# Patient Record
Sex: FEMALE | Race: WHITE | NOT HISPANIC OR LATINO | Employment: FULL TIME | URBAN - METROPOLITAN AREA
[De-identification: names, ages, dates, MRNs, and addresses within clinical notes are randomized per-mention and may not be internally consistent; named-entity substitution may affect disease eponyms.]

---

## 2019-02-14 ENCOUNTER — OFFICE VISIT CONVERTED (OUTPATIENT)
Dept: SURGERY | Facility: CLINIC | Age: 39
End: 2019-02-14
Attending: SURGERY

## 2019-02-25 ENCOUNTER — HOSPITAL ENCOUNTER (OUTPATIENT)
Dept: PERIOP | Facility: HOSPITAL | Age: 39
Setting detail: HOSPITAL OUTPATIENT SURGERY
Discharge: HOME | End: 2019-02-25
Attending: SURGERY

## 2019-02-25 LAB — HCG UR QL: NEGATIVE

## 2019-02-27 ENCOUNTER — CONVERSION ENCOUNTER (OUTPATIENT)
Dept: SURGERY | Facility: CLINIC | Age: 39
End: 2019-02-27

## 2019-03-04 ENCOUNTER — OFFICE VISIT CONVERTED (OUTPATIENT)
Dept: UROLOGY | Facility: CLINIC | Age: 39
End: 2019-03-04
Attending: UROLOGY

## 2019-03-04 ENCOUNTER — CONVERSION ENCOUNTER (OUTPATIENT)
Dept: SURGERY | Facility: CLINIC | Age: 39
End: 2019-03-04

## 2019-03-04 ENCOUNTER — HOSPITAL ENCOUNTER (OUTPATIENT)
Dept: SURGERY | Facility: CLINIC | Age: 39
Discharge: HOME | End: 2019-03-04
Attending: UROLOGY

## 2019-03-09 LAB
AMPICILLIN SUSC ISLT: 26
BACTERIA UR CULT: ABNORMAL
CIPROFLOXACIN SUSC ISLT: <=0.5
CONV  VANCOMYCIN (BP): 4
CONV GENTAMICIN (KB): 24
ERYTHROMYCIN SUSC ISLT: 4
LEVOFLOXACIN SUSC ISLT: 1
LINEZOLID SUSC ISLT: 4
Lab: 28
NITROFURANTOIN SUSC ISLT: 23
TETRACYCLINE SUSC ISLT: <=1

## 2019-03-13 ENCOUNTER — OFFICE VISIT CONVERTED (OUTPATIENT)
Dept: SURGERY | Facility: CLINIC | Age: 39
End: 2019-03-13
Attending: SURGERY

## 2019-03-21 ENCOUNTER — OFFICE VISIT CONVERTED (OUTPATIENT)
Dept: UROLOGY | Facility: CLINIC | Age: 39
End: 2019-03-21
Attending: UROLOGY

## 2019-04-22 ENCOUNTER — OFFICE VISIT CONVERTED (OUTPATIENT)
Dept: UROLOGY | Facility: CLINIC | Age: 39
End: 2019-04-22
Attending: UROLOGY

## 2019-04-22 ENCOUNTER — CONVERSION ENCOUNTER (OUTPATIENT)
Dept: SURGERY | Facility: CLINIC | Age: 39
End: 2019-04-22

## 2019-05-03 ENCOUNTER — HOSPITAL ENCOUNTER (OUTPATIENT)
Dept: PERIOP | Facility: HOSPITAL | Age: 39
Setting detail: HOSPITAL OUTPATIENT SURGERY
Discharge: HOME | End: 2019-05-03
Attending: UROLOGY

## 2019-05-03 LAB — HCG UR QL: NEGATIVE

## 2019-05-08 ENCOUNTER — OFFICE VISIT CONVERTED (OUTPATIENT)
Dept: UROLOGY | Facility: CLINIC | Age: 39
End: 2019-05-08
Attending: UROLOGY

## 2019-05-13 LAB
COLOR STONE: NORMAL
COMPN STONE: NORMAL
CONV CA OXALATE MONOHYDRATE: 95 %
CONV CALCIUM PHOSPHATE: 5 %
CONV CALCULI COMMENT: NORMAL
CONV CALCULI DISCLAIMER: NORMAL
CONV CALCULI NOTE: NORMAL
NIDUS STONE QL: NORMAL
SIZE STONE: NORMAL MM
WT STONE: 33 MG

## 2020-11-30 ENCOUNTER — CONVERSION ENCOUNTER (OUTPATIENT)
Dept: CARDIOLOGY | Facility: CLINIC | Age: 40
End: 2020-11-30

## 2020-11-30 ENCOUNTER — OFFICE VISIT CONVERTED (OUTPATIENT)
Dept: CARDIOLOGY | Facility: CLINIC | Age: 40
End: 2020-11-30
Attending: INTERNAL MEDICINE

## 2020-12-09 ENCOUNTER — OFFICE VISIT CONVERTED (OUTPATIENT)
Dept: CARDIOLOGY | Facility: CLINIC | Age: 40
End: 2020-12-09
Attending: INTERNAL MEDICINE

## 2021-03-22 ENCOUNTER — CONVERSION ENCOUNTER (OUTPATIENT)
Dept: PLASTIC SURGERY | Facility: CLINIC | Age: 41
End: 2021-03-22

## 2021-03-22 ENCOUNTER — OFFICE VISIT CONVERTED (OUTPATIENT)
Dept: PLASTIC SURGERY | Facility: CLINIC | Age: 41
End: 2021-03-22
Attending: PLASTIC SURGERY

## 2021-04-12 ENCOUNTER — OFFICE VISIT CONVERTED (OUTPATIENT)
Dept: CARDIOLOGY | Facility: CLINIC | Age: 41
End: 2021-04-12
Attending: INTERNAL MEDICINE

## 2021-05-10 NOTE — PROCEDURES
"   Procedure Note      Patient Name: Laxmi Jc   Patient ID: [id number]   Sex: Female   YOB: 1980    Primary Care Provider: Danyell Feliciano APRN   Referring Provider: Kathie GRAY    Visit Date: December 9, 2020    Provider: Rony Spann MD   Location: [facility]   Location Address: [address] [place] [address], [address]   [place]LE Ivory  [id number]   Location Phone: [phone]          FINAL REPORT   TRANSTHORACIC ECHOCARDIOGRAM REPORT    Diagnosis: Palpitations, chest pain, shortness of breath.   Height: 5'2\" Weight: 171 B/P: 132/80 BSA: 1.8   Tech: BNS   MEASUREMENTS:  RVID (Diastole) : RVID. (NORMAL: 0.7 to 2.4 cm max)   LVID (Systole): 2.5 cm (Diastole): 4.5 cm . (NORMAL: 3.7 - 5.4 cm)   Posterior Wall Thickness (Diastole): 0.9 cm. (NORMAL: 0.8 - 1.1 cm)   Septal Thickness (Diastole): 1.0 cm. (NORMAL: 0.7 - 1.2 cm)   LAID (Systole): 3.9 cm. (NORMAL: 1.9 - 3.8 cm)   Aortic Root Diameter (Diastole): 2.5 cm. (NORMAL: 2.0 - 3.7 cm)   COMMENTS:  The patient underwent 2-D, M-Mode, and Doppler examination, including pulse-wave, continuous-wave, and color-flow analysis; the study is technically adequate.   FINDINGS:  MITRAL VALVE: Normal in appearance, opens well. No evidence of mitral valve prolapse. No mitral stenosis. Trace mitral regurgitation.   AORTIC VALVE: Normal trileaflet aortic valve, opens well. No evidence of aortic stenosis or regurgitation on Doppler examination.   TRICUSPID VALVE: Normal in appearance, opens well. Trace tricuspid regurgitation. Normal pulmonary artery systolic pressure.   PULMONIC VALVE: Grossly normal.   AORTIC ROOT: Normal in size with adequate motion.   LEFT ATRIUM: Normal in size. No intracavitary masses or clots seen. LA volume index is 22 mL/m2.   LEFT VENTRICLE: The left ventricular chamber size is normal. The left ventricular wall thickness is normal. Left ventricular systolic function is normal. Calculated LV ejection fraction by 3D echocardiography is " 66%. There are no regional wall motion abnormalities. Left ventricular diastolic function is normal.   RIGHT VENTRICLE: Normal size and function.   RIGHT ATRIUM: Normal in size.   PERICARDIUM: No evidence of pericardial effusion.   INFERIOR VENA CAVA: Measures 1.6 cm in diameter and there is more than 50% collapse during inspiration.   DOPPLER: E/A ratio is 1.1. DT= 208 msec. IVRT is 60 msec. E/E' is 9.   Faxed: 12/11/2020      CONCLUSION:  1.  Normal left ventricular systolic function with a calculated LV ejection fraction of 66%.   2.  Normal diastolic function of the left ventricle.   3.  No hemodynamically significant valvular abnormalities.       MD GRACY Maynard/danielito    This note was transcribed by Lamar Mariano.  danielito/gracy  The above service was transcribed by Lamar Mariano, and I attest to the accuracy of the note.  GRACY                 Electronically Signed by: Shital Mariano-, Other -Author on December 11, 2020 03:51:03 PM  Electronically Co-signed by: Rony Spann MD -Reviewer on December 14, 2020 08:31:36 AM

## 2021-05-10 NOTE — H&P
History and Physical      Patient Name: Laxmi Jc   Patient ID: [id number]   Sex: Female   YOB: 1980    Primary Care Provider: Danyell GRAY   Referring Provider: Ange GRAY    Visit Date: March 22, 2021    Provider: Belkys Griggs MD   Location: [facility]   Location Address: [address] [place] [address]  [id number]   Location Phone: [phone]          History Of Present Illness  Laxmi Jc is a 40 year old /White female who presents to the office today as a consult from Ange GRAY.      Patient states she has not always had breast asymmetry. Patient noticed the asymmetry after her left nipple procedure, had to have a papilla removed from left breast and since that time has noticed volume match and loss of shape/contour irregularities left brest. Patient works as a  at [employer], currently working from home and does a lot of desk work. Patient is overall healthy. Patient takes Toprol for heart palpitations. Patient will require clearance from cardiology, Dr. Spann and PCP. Patient states she is currently working on quitting smoking, using a Nictrol inhaler.              Past Medical History  Anxiety; Breast lump; Nipple discharge; Pituitary adenoma; Renal stone; Urinary hesitancy; Urinary Incontinence         Past Surgical History  Breast Surgery; Cystoscopy and ureteroscopy with laser lithotripsy; Cystoscopy and ureteroscopy with stent placement; Dilation and Curettage; Lumpectomy; Ovarian cystectomy         Medication List  fexofenadine 60 mg oral tablet; Lexapro 20 mg oral tablet; Norditropin FlexPro subcutaneous; Toprol XL 25 mg oral tablet extended release 24 hr         Allergy List  PENICILLINS; SULFA (SULFONAMIDES)         Family Medical History  Von Willebrand's Disease; Stomach Neoplasm, Malignant; Stroke; Renal Calculus; Kidney Disease; Bladder calculus; Blood Diseases; Hepatitis         Social  History  Alcohol (Never); Caffeine (Current every day); Second hand smoke exposure (Current some day); Tobacco (Current every day)         Review of Systems  · Cardiovascular  o Denies  o : chest pain, lower extremity edema, Heart Attack, Abnormal EKG  · Respiratory  o Denies  o : shortness of breath, wheezing, cough  · Neurologic  o Denies  o : muscular weakness, incoordination, tingling or numbness, headaches  · Psychiatric  o Denies  o : anxiety, depression, difficulty sleeping      Vitals  Date Time BP Position Site L\R Cuff Size HR RR TEMP (F) WT  HT  BMI kg/m2 BSA m2 O2 Sat FR L/min FiO2 HC       03/22/2021 09:23 /78 Sitting    64 - R  98.5     98 %  21%          Physical Examination  · Constitutional  o Appearance  o : well developed, well-nourished, Alert and oriented X3  · Eyes  o Sclerae  o : sclerae white  · Respiratory  o Respiratory Effort  o : breathing unlabored   · Cardiovascular  o Heart  o : regular rate  · Breasts  o FEMALE BREAST EXAM  o :   § Masses  § : no masses  § Nipple Discharge  § : no nipple discharge  § Axillary Lymphadenopathy  § : no axillary lymphadenopathy  · Gastrointestinal  o Abdominal Examination  o : abdomen nontender to palpation  · Lymphatic  o Axilla  o : No lymphadenopathy present  · Skin and Subcutaneous Tissue  o General Inspection  o : no lesions present, no areas of discoloration, skin turgor normal, texture normal  · Psychiatric  o Judgement and Insight  o : judgment and insight intact  o Mood and Affect  o : mood normal, affect appropriate     left breast smaller and nipple hangs lower,           Assessment  · Pre-operative examination     V72.84/Z01.818  · Tobacco abuse     305.1/Z72.0  · Breast asymmetry     611.89/N64.89  · Intraductal papilloma of breast, left     217/D24.2      Plan  · Orders  o Smoking cessation counseling, 3-10 minutes [facility] (07205) - 305.1/Z72.0 - 03/22/2021  o Plastics reconstructive visit ([facility]REC) - - 03/23/2021  o Cotinine screen urine  (done at [facility] PAT) (99969) - - 03/23/2021  o Cotinine screen urine (done at [facility] SDS) (88568) - - 03/23/2021  o Cotinine screen urine (done at [facility]) (30364) - 305.1/Z72.0 - 03/22/2021  o [facility] Pre-Op Covid-19 Screening (87519) - - 03/23/2021  · Medications  o Medications have been Reconciled  o Transition of Care or Provider Policy  · Instructions  o Surgical Facility: [facility]   o ****Surgical Orders****  o ****Patient Status****  o ********************  o RISK AND BENEFITS:  o Consent for surgery: Given these options, the patient has verbally expressed an understanding of the risks of surgery and finds these risks acceptable. We will proceed with surgery as soon as possible.  o O.R. PREP: Per protocol  o IV: Per Anesthesia  o SCD's preoperatively  o Please sign permit for: bilateral mastopexy with fat grafting to left breast  o Please do not give any anethesia until patient's site has been marked.   o *******************************************  o PRE- OP MEDICATION ORDER:  o *******************************************  o Kefzol 2 grams IV on call to OR.  o Prior to procedure, patient had negative COVID verbal screening and signed COVID consent.  o The above History and Physical Examination has been completed within 30 days of admission.  o ********************  o Tobacco and smoking cessation counseling for more than 3 minutes was completed. I counseled the patient to stop smoking before surgery in order to avoid complications such as wound healing problems and infection. We discussed smoking cessation strategies such as nicotine patches and vapor. I counseled the patient to be nicotine free because the nicotine is what causes vasoconstriction of the blood vessels.  o The patient has volume mismatch, contour irregularities and asymmetry following removal of left breast papilloma. We had a long discussion and the best way to achieve symmetry would be a bilateral mastopexy and fat grafting to left  breast. She understands she must be nicotine free prior to surgery to avoid complications. Once she has stopped smoking she will take a cotinine test and call to schedule  o The patient was given literature in regards to the procedure and encouraged to visit the websites of ASPS and ASAPS.  o We discussed the procedure for bilateral mastopexy under general anesthesia as well as the postoperative recovery time and the need for limitation of activities. The risks of surgery were discussed including bleeding, infection, scarring (for which diagrams were drawn), pain, poor healing, loss of skin and or nipple, change in nipple sensation, inability to breast feed, asymmetry, no guarantee of postoperative cup size.  o Electronically Identified Patient Education Materials Provided Electronically            Electronically Signed by: Belkys Griggs MD -Author on March 23, 2021 07:44:53 AM

## 2021-05-10 NOTE — H&P
History and Physical      Patient Name: Laxmi Jc   Patient ID: [id number]   Sex: Female   YOB: 1980    Primary Care Provider: Danyell GRAY   Referring Provider: Kathie GRAY    Visit Date: November 30, 2020    Provider: Rony Spann MD   [facility]   Location Address: [address] [place] [address], [facility]   [place]LE Iv[place]  [id number]   Location Phone: [phone]          Chief Complaint     Reason for Consultation: Palpitations, chest pain, shortness of breath.       History Of Present Illness  Consult requested by: Kathie GRAY   Laxmi Jc is a 40 year old /White female, with pituitary adenoma and unspecified autoimmune disorder, along with depression, who was referred for a cardiac evaluation. For the past one to one-and-a-half years, patient is reporting palpitations. They are described as the heart racing and pounding, and at times, skipping beats. Symptoms are present both during rest and with activities. Even walking for a few feet makes her heart rate go up to the 180s. She brought her heart rate log from her Apple watch that showed her heart rate ranges between 60 to 180 and mostly in the 120s. She also reports chest pain, described as a sharp pain on the left side of the chest, both at rest and activities. There is shortness of breath on moderate exertion. Her symptoms are progressively getting worse. Recently, she had lab testing for hives and was noted to have an autoimmune disorder. She is going to have an appointment with a rheumatologist soon. She has no history of any previous cardiac illness and no previous cardiac workup available.   PAST MEDICAL HISTORY: 1) Pituitary adenoma; 2) Unspecified autoimmune disorder; 3) Depression; 4) Negative for diabetes mellitus, hypertension, and hyperlipidemia.   PSYCHOSOCIAL HISTORY: Current smoker who smokes a pack of cigarettes per day. Drinks alcohol rarely.   FAMILY HISTORY: Reviewed. Positive  "for premature coronary artery disease. Patient's father had a stroke in his 40's.   CURRENT MEDICATIONS: Escitalopram 20 mg daily; Norditropin FlexPro 1.5 mL/5 mg 5 days a week.   ALLERGIES: Penicillin; SULFA (SULFONAMIDES).       Review of Systems  · Constitutional  o Admits  o : fatigue, recent weight changes   o Denies  o : good general health lately  · Eyes  o Denies  o : blurred vision  · HENT  o Denies  o : hearing loss or ringing, chronic sinus problem, swollen glands in neck  · Cardiovascular  o Admits  o : chest pain, palpitations (fast, fluttering, or skipping beats), swelling (feet, ankles, hands), shortness of breath while walking or lying flat  · Respiratory  o Denies  o : chronic or frequent cough, asthma or wheezing, COPD  · Gastrointestinal  o Denies  o : ulcers, nausea or vomiting  · Neurologic  o Admits  o : headaches  o Denies  o : lightheaded or dizzy, stroke  · Musculoskeletal  o Admits  o : joint pain, back pain  · Endocrine  o Admits  o : excessive thirst or urination  o Denies  o : thyroid disease, diabetes, heat or cold intolerance  · Heme-Lymph  o Denies  o : bleeding or bruising tendency, anemia      Vitals  Date Time BP Position Site L\R Cuff Size HR RR TEMP (F) WT  HT  BMI kg/m2 BSA m2 O2 Sat FR L/min FiO2        11/30/2020 01:13 /80 Sitting    78 - R   171lbs 0oz 5'  2\" 31.28 1.84             Physical Examination  · Constitutional  o Appearance  o : Awake, alert, in no acute distress.  · Head and Face  o HEENT  o : No pallor, anicteric. Eyes normal. Moist mucous membranes.  · Neck  o Inspection/Palpation  o : Supple.   o Jugular Veins  o : No JVD. No carotid bruits.  · Respiratory  o Auscultation of Lungs  o : Clear to auscultation bilaterally. No crackles or wheezing.  · Cardiovascular  o Heart  o : S1, S2 normally heard. No S3. No murmur, rubs, or gallops.  · Gastrointestinal  o Abdominal Examination  o : Soft, non-distended. No palpable hepatosplenomegaly. Bowel sounds heard " in all four quadrants.  · Musculoskeletal  o General  o : Normal muscle tone and strength.  · Skin and Subcutaneous Tissue  o General Inspection  o : No skin rashes.  · Extremities  o Extremities  o : Warm and well perfused. Distal pulses present. No pitting pedal edema.  · EKG  o EKG  o : Performed in the office today.  o Indications  o : Palpitations.  o Results  o : Normal sinus rhythm. Normal axis. No significant ST-T changes. Normal EKG.  o Comparison  o : No previous EKG available for comparison.   · Labs  o Labs  o : On 08/15/2020, triglycerides 178, total cholesterol 210, , HDL 48, TSH 1.06, Free T4 1.03, hemoglobin 13.6, hematocrit 38.7, BUN 10, creatinine 0.77, sodium 138, potassium 4.2, chloride 103, calcium 9.4, total protein 7.3, albumin 4.3.           Assessment     ASSESSMENT & PLAN:    1. Palpitations/tachycardia.  Significant arrhythmia needs to be ruled out.  Thyroid panel within normal limits.        Hemoglobin within normal limits.  Will proceed with a 48-hour Holter monitor study.  Patient will also need       an echocardiogram to assess the LV systolic and diastolic function and to rule out significant valvular       abnormalities.  Once arrhythmias are ruled out, patient can treated with beta-blockers for inappropriate       sinus tachycardia.    2.  Chest pain.  Very atypical symptoms.  Start the workup with an echocardiogram.  3.  Follow up with echo and Holter reports.      MD GRACY Maynard:annette             Electronically Signed by: Zo Mcclain-, Other -Author on December 7, 2020 10:04:58 AM  Electronically Co-signed by: Rony Spann MD -Reviewer on December 7, 2020 10:25:53 AM

## 2021-05-10 NOTE — PROCEDURES
Procedure Note      Patient Name: Laxmi Jc   Patient ID: [id number]   Sex: Female   YOB: 1980    Primary Care Provider: Danyell GRAY   Referring Provider: Kathie GRAY    Visit Date: December 9, 2020    Provider: Rony Spann MD   Location: [facility]   Location Address: [address] [place] [address], [address]   [place]LE Iv[place]  [id number]   Location Phone: [phone]          FINAL REPORT   HOLTER MONITOR REPORT  Date: 12/09/2020   Indication: Palpitations      The patient was monitored for a period of 48 hour starting from 14:54 on 12/09/2020. The underlying rhythm was normal sinus rhythm with heart rates varying from 63 beats per minute to 147 beats per minute, average being 83 beats per minute. The fastest heart rate of 147 beats per minute was documented at 12:20 PM on 12/10/2020 in sinus tachycardia. Rare (3 in total) isolated ventricular ectopic beats noted. Rare (42 in total) supraventricular ectopic beats noted. There was a 5-beat run of supraventricular ectopy at 8:13 AM on 12/10/2020. There were no significant pauses. The patient reported several episodes of palpitations during the study period. However, there were no events noted on monitor strips at the time of reported symptoms.     CONCLUSION:  Forty-eight Holter monitor study showing sinus rhythm and a 5-beat run of supraventricular ectopy. There was no correlation of patient symptoms to any arrhythmias.      MD GRACY Maynard/pap                 Electronically Signed by: Shital Mariano-, Other -Author on December 22, 2020 08:59:25 AM  Electronically Co-signed by: Rony Spann MD -Reviewer on December 28, 2020 05:34:07 PM

## 2021-05-14 VITALS
WEIGHT: 171 LBS | BODY MASS INDEX: 31.47 KG/M2 | DIASTOLIC BLOOD PRESSURE: 80 MMHG | HEART RATE: 78 BPM | SYSTOLIC BLOOD PRESSURE: 132 MMHG | HEIGHT: 62 IN

## 2021-05-14 VITALS
OXYGEN SATURATION: 98 % | DIASTOLIC BLOOD PRESSURE: 78 MMHG | SYSTOLIC BLOOD PRESSURE: 115 MMHG | HEART RATE: 64 BPM | TEMPERATURE: 98.5 F

## 2021-05-14 VITALS
HEART RATE: 70 BPM | WEIGHT: 170 LBS | HEIGHT: 62 IN | SYSTOLIC BLOOD PRESSURE: 136 MMHG | DIASTOLIC BLOOD PRESSURE: 64 MMHG | BODY MASS INDEX: 31.28 KG/M2

## 2021-05-14 NOTE — PROGRESS NOTES
Progress Note      Patient Name: Laxmi Jc   Patient ID: [id number]   Sex: Female   YOB: 1980    Primary Care Provider: Danyell GRAY   Referring Provider: Kathie GRAY    Visit Date: April 12, 2021    Provider: Rony Spann MD   Location: [facility]   Location Address: [address] [place] [address], [address]   [place]  [id number]   Location Phone: [phone]          Chief Complaint     Follow-up visit per palpitations, chest pain, and shortness of breath.       History Of Present Illness  REFERRING CARE PROVIDER: Kathie GRAY   Laxmi Jc is a 40 year old /White female with pituitary adenoma, auto immune disease, depression, who is here for a follow-up visit. She was previously seen in November because of multiple symptoms including chest pain, shortness of breath, and palpitations. Subsequent echocardiogram was unremarkable. A 24-hour Holter monitor study showed episodes of sinus tachycardia and a 5 beat run of supraventricular ectopy. She was started on low dose beta blockers. Today, the patient reports her symptoms are somewhat better when she walks. Heart rate currently goes up to 130s and previously use to go up to 160s and 170s. She has not exercised recently. She had a visit to the ER in early March because of chest pain and numbness of the right arm. Cardiac workup was unremarkable and she was discharged home. No dizziness or syncopal episodes.   PAST MEDICAL HISTORY: 1) Pituitary adenoma; 2) Unspecified autoimmune disorder; 3) Depression; 4) Negative for diabetes mellitus, hypertension, and hyperlipidemia.   PSYCHOSOCIAL HISTORY: Denies alcohol use. Currently smokes one pack per day.   CURRENT MEDICATIONS: Medication list was reviewed and is as documented.      ALLERGIES: Sulfa; Penicillin.       Review of Systems  · Cardiovascular  o Admits  o : palpitations (fast, fluttering, or skipping beats), swelling (feet, ankles, hands), shortness of  "breath while walking or lying flat, chest pain or angina pectoris   · Respiratory  o Denies  o : chronic or frequent cough      Vitals  Date Time BP Position Site L\R Cuff Size HR RR TEMP (F) WT  HT  BMI kg/m2 BSA m2 O2 Sat FR L/min FiO2 HC       04/12/2021 02:12 /64 Sitting    70 - R   170lbs 0oz 5'  2\" 31.09 1.84             Physical Examination  · Respiratory  o Auscultation of Lungs  o : Clear to auscultation bilaterally. No crackles or rhonchi.  · Cardiovascular  o Heart  o : S1, S2 is normally heard. No S3. No murmur, rubs, or gallops.  · Gastrointestinal  o Abdominal Examination  o : Soft, nontender, nondistended. No free fluid. Bowel sounds heard in all four quadrants.  · Extremities  o Extremities  o : Warm and well perfused. No pitting pedal edema. Distal pulses present.          Assessment     1.  Tachycardia/palpitations. 24-hour Holter monitor study showed presence of sinus tachycardia and short runs of supraventricular ectopy. Symptoms are better after starting low dose beta blockers. We will increase the dose of Toprol XL 25 mg once daily and the dose can be followed and titrated up after 2-3 weeks for better symptomatic management. She will call us back if she is not feeling better after 3 weeks. Other options include changing to calcium channel blocker. Of note, previous echo showed normal left ventricular function.  2.  Chest pain, very atypical symptoms. Echo unremarkable. No chest pain episodes over the past 1 month. We will hold off on doing any further testing at this time.   3.  Follow-up in 6 months.         Rony Spann MD  JV/vh               Electronically Signed by: Soni Leija-, OT -Author on April 19, 2021 07:45:18 AM  Electronically Co-signed by: Rony Spann MD -Reviewer on April 19, 2021 08:06:36 AM  "

## 2021-05-15 VITALS — HEIGHT: 62 IN | WEIGHT: 170 LBS | BODY MASS INDEX: 31.28 KG/M2 | RESPIRATION RATE: 14 BRPM

## 2021-05-15 VITALS — BODY MASS INDEX: 30.94 KG/M2 | WEIGHT: 168.12 LBS | HEIGHT: 62 IN | RESPIRATION RATE: 12 BRPM

## 2021-05-15 VITALS — RESPIRATION RATE: 15 BRPM | WEIGHT: 168.37 LBS | BODY MASS INDEX: 30.98 KG/M2 | HEIGHT: 62 IN

## 2021-05-15 VITALS — HEIGHT: 62 IN | WEIGHT: 171.25 LBS | BODY MASS INDEX: 31.51 KG/M2 | RESPIRATION RATE: 14 BRPM

## 2021-05-15 VITALS
BODY MASS INDEX: 30.94 KG/M2 | WEIGHT: 168.12 LBS | DIASTOLIC BLOOD PRESSURE: 82 MMHG | SYSTOLIC BLOOD PRESSURE: 126 MMHG | HEIGHT: 62 IN

## 2021-05-15 VITALS
WEIGHT: 164.25 LBS | DIASTOLIC BLOOD PRESSURE: 88 MMHG | HEIGHT: 62 IN | SYSTOLIC BLOOD PRESSURE: 122 MMHG | BODY MASS INDEX: 30.22 KG/M2

## 2021-05-15 VITALS
DIASTOLIC BLOOD PRESSURE: 70 MMHG | SYSTOLIC BLOOD PRESSURE: 118 MMHG | BODY MASS INDEX: 30.91 KG/M2 | WEIGHT: 168 LBS | HEIGHT: 62 IN

## 2021-06-01 ENCOUNTER — TRANSCRIBE ORDERS (OUTPATIENT)
Dept: ADMINISTRATIVE | Facility: HOSPITAL | Age: 41
End: 2021-06-01

## 2021-06-01 DIAGNOSIS — H92.12 OTORRHEA OF LEFT EAR: Primary | ICD-10-CM

## 2021-06-09 ENCOUNTER — HOSPITAL ENCOUNTER (OUTPATIENT)
Dept: CT IMAGING | Facility: HOSPITAL | Age: 41
Discharge: HOME | End: 2021-06-09
Admitting: OTOLARYNGOLOGY

## 2021-06-09 DIAGNOSIS — H92.12 OTORRHEA OF LEFT EAR: ICD-10-CM

## 2021-06-09 PROCEDURE — 70480 CT ORBIT/EAR/FOSSA W/O DYE: CPT

## 2021-06-29 RX ORDER — METOPROLOL SUCCINATE 25 MG/1
1 TABLET, EXTENDED RELEASE ORAL DAILY
COMMUNITY
Start: 2021-01-05 | End: 2021-06-29 | Stop reason: SDUPTHER

## 2021-06-29 RX ORDER — METOPROLOL SUCCINATE 25 MG/1
25 TABLET, EXTENDED RELEASE ORAL DAILY
Qty: 90 TABLET | Refills: 3 | Status: SHIPPED | OUTPATIENT
Start: 2021-06-29 | End: 2022-05-10

## 2021-06-30 ENCOUNTER — TRANSCRIBE ORDERS (OUTPATIENT)
Dept: ADMINISTRATIVE | Facility: HOSPITAL | Age: 41
End: 2021-06-30

## 2021-06-30 DIAGNOSIS — R20.9 COLD FEET: ICD-10-CM

## 2021-06-30 DIAGNOSIS — I73.00 RAYNAUD'S SYNDROME WITHOUT GANGRENE: Primary | ICD-10-CM

## 2021-07-13 ENCOUNTER — HOSPITAL ENCOUNTER (OUTPATIENT)
Dept: CARDIOLOGY | Facility: HOSPITAL | Age: 41
Discharge: HOME | End: 2021-07-13
Admitting: INTERNAL MEDICINE

## 2021-07-13 DIAGNOSIS — I73.00 RAYNAUD'S SYNDROME WITHOUT GANGRENE: ICD-10-CM

## 2021-07-13 DIAGNOSIS — R20.9 COLD FEET: ICD-10-CM

## 2021-07-13 LAB
BH CV LOWER ARTERIAL LEFT ABI RATIO: 1.34
BH CV LOWER ARTERIAL LEFT DORSALIS PEDIS SYS MAX: 125 MMHG
BH CV LOWER ARTERIAL LEFT GREAT TOE SYS MAX: 91 MMHG
BH CV LOWER ARTERIAL LEFT LOW THIGH SYS MAX: 122 MMHG
BH CV LOWER ARTERIAL LEFT POPLITEAL SYS MAX: 149 MMHG
BH CV LOWER ARTERIAL LEFT POST TIBIAL SYS MAX: 146 MMHG
BH CV LOWER ARTERIAL RIGHT ABI RATIO: 1.37
BH CV LOWER ARTERIAL RIGHT DORSALIS PEDIS SYS MAX: 149 MMHG
BH CV LOWER ARTERIAL RIGHT GREAT TOE SYS MAX: 90 MMHG
BH CV LOWER ARTERIAL RIGHT LOW THIGH SYS MAX: 130 MMHG
BH CV LOWER ARTERIAL RIGHT POPLITEAL SYS MAX: 149 MMHG
BH CV LOWER ARTERIAL RIGHT POST TIBIAL SYS MAX: 149 MMHG
MAXIMAL PREDICTED HEART RATE: 180 BPM
STRESS TARGET HR: 153 BPM
UPPER ARTERIAL LEFT ARM BRACHIAL SYS MAX: 109 MMHG
UPPER ARTERIAL RIGHT ARM BRACHIAL SYS MAX: 104 MMHG

## 2021-07-13 PROCEDURE — 93923 UPR/LXTR ART STDY 3+ LVLS: CPT

## 2021-07-13 PROCEDURE — 93923 UPR/LXTR ART STDY 3+ LVLS: CPT | Performed by: SURGERY

## 2021-09-03 ENCOUNTER — TRANSCRIBE ORDERS (OUTPATIENT)
Dept: ADMINISTRATIVE | Facility: HOSPITAL | Age: 41
End: 2021-09-03

## 2021-09-03 DIAGNOSIS — Z12.31 VISIT FOR SCREENING MAMMOGRAM: Primary | ICD-10-CM

## 2021-10-07 ENCOUNTER — OFFICE VISIT (OUTPATIENT)
Dept: NEUROSURGERY | Facility: CLINIC | Age: 41
End: 2021-10-07

## 2021-10-07 VITALS
BODY MASS INDEX: 32.2 KG/M2 | DIASTOLIC BLOOD PRESSURE: 65 MMHG | SYSTOLIC BLOOD PRESSURE: 111 MMHG | HEIGHT: 62 IN | WEIGHT: 175 LBS | HEART RATE: 70 BPM

## 2021-10-07 DIAGNOSIS — M51.36 DEGENERATIVE DISC DISEASE, LUMBAR: Primary | ICD-10-CM

## 2021-10-07 DIAGNOSIS — M47.816 LUMBAR FACET ARTHROPATHY: ICD-10-CM

## 2021-10-07 PROCEDURE — 99204 OFFICE O/P NEW MOD 45 MIN: CPT | Performed by: NURSE PRACTITIONER

## 2021-10-07 RX ORDER — FEXOFENADINE HYDROCHLORIDE 60 MG/1
60 TABLET, FILM COATED ORAL DAILY
COMMUNITY

## 2021-10-07 RX ORDER — METHOCARBAMOL 750 MG/1
750 TABLET, FILM COATED ORAL AS NEEDED
COMMUNITY
Start: 2021-07-13 | End: 2022-12-20

## 2021-10-07 RX ORDER — ESCITALOPRAM OXALATE 20 MG/1
20 TABLET ORAL DAILY
COMMUNITY
Start: 2021-09-09 | End: 2022-12-20

## 2021-10-07 RX ORDER — TRAMADOL HYDROCHLORIDE 50 MG/1
50 TABLET ORAL AS NEEDED
COMMUNITY
Start: 2021-07-13 | End: 2022-12-20

## 2021-10-07 RX ORDER — CABERGOLINE 0.5 MG/1
0.5 TABLET ORAL WEEKLY
COMMUNITY
Start: 2021-07-26 | End: 2022-12-20

## 2021-10-07 RX ORDER — LIDOCAINE 50 MG/G
1 PATCH TOPICAL AS NEEDED
COMMUNITY
Start: 2021-07-13 | End: 2022-12-20

## 2021-10-07 NOTE — PROGRESS NOTES
"Chief Complaint  Back Pain    Subjective          Laxmi Jc who is a 40 y.o. year old female who presents to [facility] for evaluation of her low back pain.     Pt with concerns of low back pain starting around 6 months ago. She reports that she was planting raised garden boxes, leaning over and bending which caused increased pain across the low back. She endorses tightness, stiffness, stabbing pain, with muscle spasm. She will feel frozen like she is unable to move. Having pain and stiffness in the hips. Prolonged sitting and standing make her pain worse. She is having trouble sleeping at night. Reports urinary and bowel incontinence. She denies weakness in the legs. She reports radiating pain into the legs, sharp and shooting causing the leg to give out on her.     She has been using lidocaine patches which provides modest benefit. Taking Ibuprofen, which helps with aching pain though does not help sharp shooting pains. Recently started methocarbamol, though she is unsure this providing benefit. Prescribed tramadol though does not take this. She will wear a back brace at times. Has not had recent physical therapy. She has seen a chiropractor in the past, primarily for her hips.     MRI Lumbar Spine on 7/17/21 personally reviewed. Mild degenerative changes and facet arthropathy in the lower lumbar spine.    Recent Interventions: Medication management.       Review of Systems   Musculoskeletal: Positive for arthralgias, back pain, gait problem and myalgias.   Neurological: Positive for numbness.   All other systems reviewed and are negative.       Objective   Vital Signs:   /65   Pulse 70   Ht 157.5 cm (62\")   Wt 79.4 kg (175 lb)   BMI 32.01 kg/m²       Physical Exam  Vitals reviewed.   Constitutional:       Appearance: Normal appearance.   Musculoskeletal:      Lumbar back: Tenderness present. Negative right straight leg raise test and negative left " straight leg raise test.   Neurological:      Mental Status: She is alert and oriented to person, place, and time.      Gait: Gait is intact.      Deep Tendon Reflexes: Strength normal.      Reflex Scores:       Patellar reflexes are 2+ on the right side and 2+ on the left side.       Achilles reflexes are 2+ on the right side and 2+ on the left side.       Neurologic Exam     Mental Status   Oriented to person, place, and time.   Level of consciousness: alert    Motor Exam   Muscle bulk: normal  Overall muscle tone: normal    Strength   Strength 5/5 throughout.     Sensory Exam   Light touch normal.     Gait, Coordination, and Reflexes     Gait  Gait: normal    Reflexes   Right patellar: 2+  Left patellar: 2+  Right achilles: 2+  Left achilles: 2+       Result Review :       Data reviewed: Radiologic studies MRI Lumbar Spine on 7/17/21 personally reviewed. Mild degenerative changes and facet arthropathy in the lower lumbar spine.          Assessment and Plan    Diagnoses and all orders for this visit:    1. Degenerative disc disease, lumbar (Primary)  -     Ambulatory Referral to Physical Therapy Evaluate and treat; Heat; Moist heat; Cross Fiber; Stretching (Traction), ROM, Strengthening    2. Lumbar facet arthropathy  -     Ambulatory Referral to Physical Therapy Evaluate and treat; Heat; Moist heat; Cross Fiber; Stretching (Traction), ROM, Strengthening    Pt presenting with low back pain. We reviewed her MRI Lumbar Spine, showing mild degenerative changes. No surgical recommendations at this time. We discussed the benefits of core strengthening, stretching, and weight management for low back pain. Will refer to physical therapy. She can follow up in our clinic on an as needed basis.    She mentions in our visit today that she was referred to our office for a pituitary adenoma. I do not see imaging supporting this. However I did discuss with her today that we do not follow this and she would need to be referred  to a specialist in [place] or [place].     I spent 45 minutes caring for Laxmi on this date of service. This time includes time spent by me in the following activities:preparing for the visit, reviewing tests, obtaining and/or reviewing a separately obtained history, performing a medically appropriate examination and/or evaluation , counseling and educating the patient/family/caregiver, referring and communicating with other health care professionals , documenting information in the medical record and independently interpreting results and communicating that information with the patient/family/caregiver.    Follow Up   Return if symptoms worsen or fail to improve.  Patient was given instructions and counseling regarding her condition or for health maintenance advice.     -Refer to Physical therapy  -Follow up as needed

## 2021-10-28 ENCOUNTER — TELEPHONE (OUTPATIENT)
Dept: NEUROSURGERY | Facility: CLINIC | Age: 41
End: 2021-10-28

## 2021-10-28 NOTE — TELEPHONE ENCOUNTER
PATIENT WAS IN TO SEE TERE SULLIVAN AND WANTS TO KNOW IF SHE CAN BE REFERRED TO PAIN MGMT. PATIENT WENT TO HER PCP AND WAS RECOMMENDED PHYSICAL THERAPY AND NOT PAIN MGMT.  PLEASE ADVISE    [phone]

## 2021-11-01 NOTE — TELEPHONE ENCOUNTER
Her last office note has a referral to physical therapy for dry needling. Would recommend starting with that and if having continued pain symptoms she can schedule a follow up to discuss additional options.

## 2021-11-23 ENCOUNTER — HOSPITAL ENCOUNTER (OUTPATIENT)
Dept: MAMMOGRAPHY | Facility: HOSPITAL | Age: 41
Discharge: HOME | End: 2021-11-23
Admitting: OBSTETRICS & GYNECOLOGY

## 2021-11-23 DIAGNOSIS — Z12.31 VISIT FOR SCREENING MAMMOGRAM: ICD-10-CM

## 2021-11-23 PROCEDURE — 77063 BREAST TOMOSYNTHESIS BI: CPT

## 2021-11-23 PROCEDURE — 77067 SCR MAMMO BI INCL CAD: CPT

## 2021-11-24 ENCOUNTER — APPOINTMENT (OUTPATIENT)
Dept: MAMMOGRAPHY | Facility: HOSPITAL | Age: 41
End: 2021-11-24

## 2021-11-29 ENCOUNTER — OFFICE VISIT (OUTPATIENT)
Dept: CARDIOLOGY | Facility: CLINIC | Age: 41
End: 2021-11-29

## 2021-11-29 VITALS
HEIGHT: 62 IN | HEART RATE: 79 BPM | DIASTOLIC BLOOD PRESSURE: 66 MMHG | WEIGHT: 174 LBS | SYSTOLIC BLOOD PRESSURE: 108 MMHG | BODY MASS INDEX: 32.02 KG/M2

## 2021-11-29 DIAGNOSIS — R00.2 PALPITATIONS: Primary | ICD-10-CM

## 2021-11-29 PROCEDURE — 99214 OFFICE O/P EST MOD 30 MIN: CPT | Performed by: INTERNAL MEDICINE

## 2021-11-29 RX ORDER — BETAMETHASONE DIPROPIONATE 0.5 MG/G
LOTION TOPICAL
COMMUNITY
Start: 2021-11-12 | End: 2022-12-20

## 2021-11-29 RX ORDER — SOMATROPIN 5 MG/1.5ML
INJECTION, SOLUTION SUBCUTANEOUS
COMMUNITY
Start: 2021-11-15

## 2021-11-29 NOTE — ASSESSMENT & PLAN NOTE
Previous Holter monitor study showed episodes of sinus tachycardia and a 5 beat run of supraventricular ectopy.  Symptoms are very well controlled with current dose of metoprolol which I will continue.  Previous echocardiogram showed preserved LV function and no valvular abnormalities.

## 2021-11-29 NOTE — PROGRESS NOTES
CARDIOLOGY FOLLOW-UP PROGRESS NOTE        Chief Complaint  Follow-up, Palpitations (not currently having), and Shortness of Breath (not currently having )    Subjective            Laxmi Jc presents to [facility]  History of Present Illness    This is a 41-year-old female pituitary tumor, autoimmune disease, depression, who is here as a follow-up visit for palpitations.  She was previously seen in 2020 because of multiple symptoms including chest pain, shortness of breath or palpitations.  24-hour Holter monitor study showed episodes of sinus tachycardia and a 5 beat run of supraventricular ectopy.  She was started on low-dose beta-blockers and the dose of metoprolol was increased during last visit.  Today she reports feeling fine.  She has not had any major palpitations spells recently.  Denies any chest pain episodes for the past several months.  Denies shortness of breath, dizziness or syncopal episodes.  Overall feeling fine.       Past History:    1) Pituitary adenoma; 2) Unspecified autoimmune disorder; 3) Depression; 4) Negative for diabetes mellitus, hypertension, and hyperlipidemia.     Medical History:  Past Medical History:   Diagnosis Date   • Low back pain        Surgical History: has no past surgical history on file.     Family History: Reviewed, no family history of premature coronary artery disease.    Social History: reports that she has been smoking cigarettes. She has been smoking about 0.50 packs per day. She has never used smokeless tobacco. She reports that she does not drink alcohol and does not use drugs.    Allergies: Penicillins and Sulfa antibiotics    Current Outpatient Medications on File Prior to Visit   Medication Sig   • betamethasone dipropionate (DIPROLENE) 0.05 % lotion    • cabergoline (DOSTINEX) 0.5 MG tablet Take 0.5 mg by mouth 1 (One) Time Per Week.   • escitalopram (LEXAPRO) 20 MG tablet Take 20 mg by mouth Daily.   • fexofenadine  "(ALLEGRA) 60 MG tablet Take 60 mg by mouth Daily. Takes seasonally   • lidocaine (LIDODERM) 5 % Place 1 patch on the skin as directed by provider As Needed.   • methocarbamol (ROBAXIN) 750 MG tablet Take 750 mg by mouth As Needed.   • metoprolol succinate XL (Toprol XL) 25 MG 24 hr tablet Take 1 tablet by mouth Daily.   • Norditropin FlexPro 5 MG/1.5ML solution pen-injector    • traMADol (ULTRAM) 50 MG tablet Take 50 mg by mouth As Needed.     No current facility-administered medications on file prior to visit.          Review of Systems   Respiratory: Negative for cough, shortness of breath and wheezing.    Cardiovascular: Positive for palpitations (Rare). Negative for chest pain and leg swelling.   Gastrointestinal: Negative for nausea and vomiting.   Neurological: Negative for dizziness and syncope.        Objective     /66   Pulse 79   Ht 157.5 cm (62\")   Wt 78.9 kg (174 lb)   BMI 31.83 kg/m²       Physical Exam    General : Alert, awake, no acute distress  Neck : Supple, no carotid bruit, no jugular venous distention  CVS : Regular rate and rhythm, no murmur, rubs or gallops  Lungs: Clear to auscultation bilaterally, no crackles or rhonchi  Abdomen: Soft, nontender, bowel sounds heard in all 4 quadrants  Extremities: Warm, well-perfused, no pedal edema    Result Review :     The following data was reviewed by: Rony Spann MD on 11/29/2021:    CMP    CMP 3/3/21   Glucose 90   BUN 11   Creatinine 0.73   Sodium 139   Potassium 3.4 (A)   Chloride 102   Calcium 9.3   Albumin 4.2   Total Bilirubin <0.15 (A)   Alkaline Phosphatase 70   AST (SGOT) 14 (A)   ALT (SGPT) 13   (A) Abnormal value            CBC    CBC 3/3/21   WBC 10.58   RBC 4.35   Hemoglobin 13.4   Hematocrit 39.1   MCV 89.9   MCH 30.8   MCHC 34.3   RDW 14.6 (A)   Platelets 310   (A) Abnormal value            TSH    TSH 3/3/21   TSH 2.630                  Data reviewed: Cardiology studies      Holter monitor study done in December 2020 " showed    Forty-eight Holter monitor study showing sinus rhythm and a 5-beat run of supraventricular ectopy. There was no correlation of patient symptoms to any arrhythmias.                   Assessment and Plan        Diagnoses and all orders for this visit:    1. Palpitations (Primary)  Assessment & Plan:  Previous Holter monitor study showed episodes of sinus tachycardia and a 5 beat run of supraventricular ectopy.  Symptoms are very well controlled with current dose of metoprolol which I will continue.  Previous echocardiogram showed preserved LV function and no valvular abnormalities.              Follow Up     Return in about 1 year (around 11/29/2022) for Next scheduled follow up, Recheck.    Patient was given instructions and counseling regarding her condition or for health maintenance advice. Please see specific information pulled into the AVS if appropriate.

## 2022-05-02 ENCOUNTER — HOSPITAL ENCOUNTER (OUTPATIENT)
Dept: GENERAL RADIOLOGY | Facility: HOSPITAL | Age: 42
Discharge: HOME | End: 2022-05-02
Admitting: UROLOGY

## 2022-05-02 ENCOUNTER — OFFICE VISIT (OUTPATIENT)
Dept: UROLOGY | Facility: CLINIC | Age: 42
End: 2022-05-02

## 2022-05-02 VITALS
DIASTOLIC BLOOD PRESSURE: 65 MMHG | OXYGEN SATURATION: 99 % | WEIGHT: 176.2 LBS | HEART RATE: 66 BPM | BODY MASS INDEX: 32.42 KG/M2 | SYSTOLIC BLOOD PRESSURE: 122 MMHG | HEIGHT: 62 IN

## 2022-05-02 DIAGNOSIS — N20.0 RENAL STONES: ICD-10-CM

## 2022-05-02 DIAGNOSIS — R31.9 HEMATURIA, UNSPECIFIED TYPE: Primary | ICD-10-CM

## 2022-05-02 DIAGNOSIS — N32.81 OAB (OVERACTIVE BLADDER): ICD-10-CM

## 2022-05-02 LAB
BILIRUB BLD-MCNC: NEGATIVE MG/DL
CLARITY, POC: CLEAR
COLOR UR: YELLOW
EXPIRATION DATE: ABNORMAL
GLUCOSE UR STRIP-MCNC: NEGATIVE MG/DL
KETONES UR QL: NEGATIVE
LEUKOCYTE EST, POC: ABNORMAL
Lab: ABNORMAL
NITRITE UR-MCNC: NEGATIVE MG/ML
PH UR: 6 [PH] (ref 5–8)
PROT UR STRIP-MCNC: ABNORMAL MG/DL
RBC # UR STRIP: ABNORMAL /UL
SP GR UR: 1.03 (ref 1–1.03)
SPECIMEN VOL 24H UR: NORMAL L
UROBILINOGEN UR QL: NORMAL

## 2022-05-02 PROCEDURE — 51798 US URINE CAPACITY MEASURE: CPT | Performed by: UROLOGY

## 2022-05-02 PROCEDURE — 99214 OFFICE O/P EST MOD 30 MIN: CPT | Performed by: UROLOGY

## 2022-05-02 PROCEDURE — 74018 RADEX ABDOMEN 1 VIEW: CPT

## 2022-05-02 PROCEDURE — 81003 URINALYSIS AUTO W/O SCOPE: CPT | Performed by: UROLOGY

## 2022-05-02 RX ORDER — OXYBUTYNIN CHLORIDE 5 MG/1
5 TABLET, EXTENDED RELEASE ORAL DAILY
Qty: 30 TABLET | Refills: 11 | Status: SHIPPED | OUTPATIENT
Start: 2022-05-02

## 2022-05-02 NOTE — PROGRESS NOTES
"Chief Complaint  Urinary Retention    Subjective          Laxmi Jc presents to [facility]  History of Present Illness  Ms. Early is a former kidney stone patient, last seen in 05/2019. The patient's urine was positive for hematuria.     The patient reports she is doing well. She reports she has not had any stones that she is aware of. She reports she has not had any x-rays. The patient reports after her last kidney stones in 2019, she did well with urination for a \"long while\". She states she feels like she is not producing enough urine.  She reports she is not having trouble emptying her bladder.  The patient states she was told that her bladder was actually empty. She reports she feels like she has to urinate all day, but when she goes it is very little urine output. She reports when she wakes in the morning she has a strong urgency to urinate. The patient admits she has leakage if she laughs too hard. She reports she urinates a lot and then if she gets up, she still feels like she has to urinate. The patient reports she stopped drinking soda after she had kidney stones. She states she only drinks soda if she goes out to eat. She denies any other issues. The patient is agreeable to start oxybutynin and x-rays to rule out kidney stones.     Objective   Vital Signs:   /65   Pulse 66   Ht 157.5 cm (62\")   Wt 79.9 kg (176 lb 3.2 oz)   SpO2 99%   BMI 32.23 kg/m²       Physical Exam  Vitals and nursing note reviewed.   Constitutional:       Appearance: Normal appearance. She is well-developed.   Pulmonary:      Effort: Pulmonary effort is normal.      Breath sounds: Normal air entry.   Neurological:      Mental Status: She is alert and oriented to person, place, and time.      Motor: Motor function is intact.   Psychiatric:         Mood and Affect: Mood normal.         Behavior: Behavior normal.         Result Review :    The following data was reviewed by: Janay Us, " MD on 05/02/2022:          Assessment and Plan    Diagnoses and all orders for this visit:    1. Hematuria, unspecified type (Primary)  Comments:  We will continue to monitor and we see her back in 1 year.  Orders:  -     POC Urinalysis Dipstick, Automated    2. OAB (overactive bladder)  Comments:  I started her on oxybutynin ER 5 mg once a day and we will see her back in 1 year.  Orders:  -     POC Urinalysis Dipstick, Automated  -     Bladder Scan  -     oxybutynin XL (DITROPAN-XL) 5 MG 24 hr tablet; Take 1 tablet by mouth Daily.  Dispense: 30 tablet; Refill: 11    3. Renal stones  Comments:  We will get a KUB now and again in 1 year. We will contact her with results of todays KUB.   Orders:  -     POC Urinalysis Dipstick, Automated  -     XR Abdomen KUB; Future  -     XR Abdomen KUB; Future            Follow Up       Return in about 1 year (around 5/2/2023) for KUB prior, Annual Visit.  Patient was given instructions and counseling regarding her condition or for health maintenance advice. Please see specific information pulled into the AVS if appropriate.     Transcribed from ambient dictation for Janay Us MD by Vonnie Wiley.  05/02/22   10:38 EDT    Patient verbalized consent to the visit recording.

## 2022-05-10 RX ORDER — METOPROLOL SUCCINATE 25 MG/1
TABLET, EXTENDED RELEASE ORAL
Qty: 90 TABLET | Refills: 3 | Status: SHIPPED | OUTPATIENT
Start: 2022-05-10 | End: 2022-11-29 | Stop reason: SDUPTHER

## 2022-08-18 ENCOUNTER — TELEPHONE (OUTPATIENT)
Dept: PLASTIC SURGERY | Facility: CLINIC | Age: 42
End: 2022-08-18

## 2022-09-28 ENCOUNTER — TELEPHONE (OUTPATIENT)
Dept: CARDIOLOGY | Facility: CLINIC | Age: 42
End: 2022-09-28

## 2022-09-28 NOTE — TELEPHONE ENCOUNTER
[facility], Dr. Tompkins's office, called requesting surgical clearance for patient that is scheduled to have liposuction and fat graft on 09/30/22.  Fax # [phone]

## 2022-09-28 NOTE — TELEPHONE ENCOUNTER
Procedure: Liposuction and fat graft on (09/30/22)    Medication Directive: NA    PMH: Palpitations    Last Seen: 11/29/2021

## 2022-11-29 ENCOUNTER — OFFICE VISIT (OUTPATIENT)
Dept: CARDIOLOGY | Facility: CLINIC | Age: 42
End: 2022-11-29

## 2022-11-29 VITALS
DIASTOLIC BLOOD PRESSURE: 64 MMHG | HEART RATE: 65 BPM | BODY MASS INDEX: 30.44 KG/M2 | WEIGHT: 165.4 LBS | SYSTOLIC BLOOD PRESSURE: 118 MMHG | HEIGHT: 62 IN

## 2022-11-29 DIAGNOSIS — R00.2 PALPITATIONS: Primary | ICD-10-CM

## 2022-11-29 PROCEDURE — 99213 OFFICE O/P EST LOW 20 MIN: CPT | Performed by: INTERNAL MEDICINE

## 2022-11-29 RX ORDER — METOPROLOL SUCCINATE 25 MG/1
25 TABLET, EXTENDED RELEASE ORAL DAILY
Qty: 90 TABLET | Refills: 3 | Status: SHIPPED | OUTPATIENT
Start: 2022-11-29

## 2022-11-29 NOTE — PROGRESS NOTES
CARDIOLOGY FOLLOW-UP PROGRESS NOTE        Chief Complaint  Follow-up and Palpitations    Subjective            Laxmi Jc presents to [facility]  History of Present Illness    Ms Jc is here for routine follow-up visit for palpitations.  Previous Holter monitor study showed inappropriate sinus tachycardia and short run of supraventricular ectopy.  Symptoms are well controlled at this time.  She gets fluttering sensation in chest rarely, lasting only for few seconds.  She is taking metoprolol succinate as prescribed.  Denies any chest pain or shortness of breath.  Denies dizziness.      Past History:    1) Pituitary adenoma; 2) Unspecified autoimmune disorder; 3) Depression; 4) Negative for diabetes mellitus, hypertension, and hyperlipidemia. 5) overactive bladder       Medical History:  Past Medical History:   Diagnosis Date   • Low back pain        Surgical History: has a past surgical history that includes Kidney stone surgery and Breast lumpectomy.     Family History: family history includes Hepatitis in her mother.     Social History: reports that she has been smoking cigarettes. She has been smoking an average of .5 packs per day. She has never used smokeless tobacco. She reports that she does not drink alcohol and does not use drugs.    Allergies: Penicillins and Sulfa antibiotics    Current Outpatient Medications on File Prior to Visit   Medication Sig   • betamethasone dipropionate (DIPROLENE) 0.05 % lotion    • cabergoline (DOSTINEX) 0.5 MG tablet Take 0.5 mg by mouth 1 (One) Time Per Week.   • escitalopram (LEXAPRO) 20 MG tablet Take 20 mg by mouth Daily.   • fexofenadine (ALLEGRA) 60 MG tablet Take 60 mg by mouth Daily. Takes seasonally   • lidocaine (LIDODERM) 5 % Place 1 patch on the skin as directed by provider As Needed.   • methocarbamol (ROBAXIN) 750 MG tablet Take 750 mg by mouth As Needed.   • Norditropin FlexPro 5 MG/1.5ML solution pen-injector    •  "oxybutynin XL (DITROPAN-XL) 5 MG 24 hr tablet Take 1 tablet by mouth Daily.   • traMADol (ULTRAM) 50 MG tablet Take 50 mg by mouth As Needed.   • [DISCONTINUED] metoprolol succinate XL (TOPROL-XL) 25 MG 24 hr tablet TAKE 1 TABLET DAILY     No current facility-administered medications on file prior to visit.          Review of Systems     Objective     /64   Pulse 65   Ht 157.5 cm (62\")   Wt 75 kg (165 lb 6.4 oz)   BMI 30.25 kg/m²       Physical Exam    General : Alert, awake, no acute distress  Neck : Supple, no carotid bruit, no jugular venous distention  CVS : Regular rate and rhythm, no murmur, rubs or gallops  Lungs: Clear to auscultation bilaterally, no crackles or rhonchi  Abdomen: Soft, nontender, bowel sounds heard in all 4 quadrants  Extremities: Warm, well-perfused, no pedal edema    Result Review :     The following data was reviewed by: Rony Spann MD on 11/29/2022:      No recent labs available for review           Data reviewed: Cardiology studies      Echocardiogram done on 12/9/2020 showed    1.  Normal left ventricular systolic function with a calculated LV ejection fraction of 66%.   2.  Normal diastolic function of the left ventricle.   3.  No hemodynamically significant valvular abnormalities.                    Assessment and Plan        Diagnoses and all orders for this visit:    1. Palpitations (Primary)  Assessment & Plan:  Previous Holter monitor study showed episodes of sinus tachycardia and a 5 beat run of supraventricular ectopy.  Symptoms are reasonably well controlled with no significant palpitations.  Continue metoprolol succinate 25 mg once daily.    Orders:  -     metoprolol succinate XL (TOPROL-XL) 25 MG 24 hr tablet; Take 1 tablet by mouth Daily.  Dispense: 90 tablet; Refill: 3            Follow Up     Return in about 1 year (around 11/29/2023) for Next scheduled follow up.    Patient was given instructions and counseling regarding her condition or for health " maintenance advice. Please see specific information pulled into the AVS if appropriate.

## 2022-11-29 NOTE — ASSESSMENT & PLAN NOTE
Previous Holter monitor study showed episodes of sinus tachycardia and a 5 beat run of supraventricular ectopy.  Symptoms are reasonably well controlled with no significant palpitations.  Continue metoprolol succinate 25 mg once daily.

## 2022-12-20 ENCOUNTER — OFFICE VISIT (OUTPATIENT)
Dept: FAMILY MEDICINE CLINIC | Facility: CLINIC | Age: 42
End: 2022-12-20

## 2022-12-20 VITALS
BODY MASS INDEX: 30.07 KG/M2 | WEIGHT: 163.4 LBS | DIASTOLIC BLOOD PRESSURE: 72 MMHG | TEMPERATURE: 97.3 F | HEIGHT: 62 IN | SYSTOLIC BLOOD PRESSURE: 118 MMHG

## 2022-12-20 DIAGNOSIS — Z13.220 SCREENING FOR CHOLESTEROL LEVEL: ICD-10-CM

## 2022-12-20 DIAGNOSIS — Z11.59 ENCOUNTER FOR HEPATITIS C SCREENING TEST FOR LOW RISK PATIENT: ICD-10-CM

## 2022-12-20 DIAGNOSIS — Z79.899 MEDICATION MANAGEMENT: ICD-10-CM

## 2022-12-20 DIAGNOSIS — D35.2 PITUITARY ADENOMA: ICD-10-CM

## 2022-12-20 DIAGNOSIS — Z76.89 ENCOUNTER TO ESTABLISH CARE: Primary | ICD-10-CM

## 2022-12-20 DIAGNOSIS — Z13.29 SCREENING FOR THYROID DISORDER: ICD-10-CM

## 2022-12-20 PROBLEM — Z87.442 HISTORY OF KIDNEY STONES: Status: ACTIVE | Noted: 2022-12-20

## 2022-12-20 PROBLEM — G93.32 CHRONIC FATIGUE SYNDROME: Status: ACTIVE | Noted: 2022-12-19

## 2022-12-20 PROBLEM — I73.00 RAYNAUD'S DISEASE: Status: ACTIVE | Noted: 2022-12-19

## 2022-12-20 PROBLEM — K21.9 GASTROESOPHAGEAL REFLUX DISEASE: Status: ACTIVE | Noted: 2022-12-19

## 2022-12-20 PROBLEM — R76.8 ELEVATED ANTINUCLEAR ANTIBODY (ANA) LEVEL: Status: ACTIVE | Noted: 2022-12-19

## 2022-12-20 PROBLEM — F41.9 ANXIETY: Status: ACTIVE | Noted: 2022-12-20

## 2022-12-20 PROBLEM — N63.0 BREAST LUMP: Status: ACTIVE | Noted: 2022-12-20

## 2022-12-20 LAB
ALBUMIN SERPL-MCNC: 4 G/DL (ref 3.5–5.2)
ALBUMIN/GLOB SERPL: 1.5 G/DL
ALP SERPL-CCNC: 72 U/L (ref 39–117)
ALT SERPL W P-5'-P-CCNC: 14 U/L (ref 1–33)
ANION GAP SERPL CALCULATED.3IONS-SCNC: 8 MMOL/L (ref 5–15)
AST SERPL-CCNC: 18 U/L (ref 1–32)
BASOPHILS # BLD AUTO: 0.06 10*3/MM3 (ref 0–0.2)
BASOPHILS NFR BLD AUTO: 1 % (ref 0–1.5)
BILIRUB SERPL-MCNC: 0.2 MG/DL (ref 0–1.2)
BUN SERPL-MCNC: 12 MG/DL (ref 6–20)
BUN/CREAT SERPL: 14.5 (ref 7–25)
CALCIUM SPEC-SCNC: 9 MG/DL (ref 8.6–10.5)
CHLORIDE SERPL-SCNC: 110 MMOL/L (ref 98–107)
CHOLEST SERPL-MCNC: 160 MG/DL (ref 0–200)
CO2 SERPL-SCNC: 22 MMOL/L (ref 22–29)
CREAT SERPL-MCNC: 0.83 MG/DL (ref 0.57–1)
DEPRECATED RDW RBC AUTO: 44.7 FL (ref 37–54)
EGFRCR SERPLBLD CKD-EPI 2021: 90.4 ML/MIN/1.73
EOSINOPHIL # BLD AUTO: 0.53 10*3/MM3 (ref 0–0.4)
EOSINOPHIL NFR BLD AUTO: 9 % (ref 0.3–6.2)
ERYTHROCYTE [DISTWIDTH] IN BLOOD BY AUTOMATED COUNT: 12.9 % (ref 12.3–15.4)
GLOBULIN UR ELPH-MCNC: 2.7 GM/DL
GLUCOSE SERPL-MCNC: 97 MG/DL (ref 65–99)
HCT VFR BLD AUTO: 41 % (ref 34–46.6)
HCV AB SER DONR QL: NORMAL
HDLC SERPL-MCNC: 30 MG/DL (ref 40–60)
HGB BLD-MCNC: 13.7 G/DL (ref 12–15.9)
IMM GRANULOCYTES # BLD AUTO: 0.01 10*3/MM3 (ref 0–0.05)
IMM GRANULOCYTES NFR BLD AUTO: 0.2 % (ref 0–0.5)
LDLC SERPL CALC-MCNC: 98 MG/DL (ref 0–100)
LDLC/HDLC SERPL: 3.1 {RATIO}
LYMPHOCYTES # BLD AUTO: 1.78 10*3/MM3 (ref 0.7–3.1)
LYMPHOCYTES NFR BLD AUTO: 30.4 % (ref 19.6–45.3)
MCH RBC QN AUTO: 31.6 PG (ref 26.6–33)
MCHC RBC AUTO-ENTMCNC: 33.4 G/DL (ref 31.5–35.7)
MCV RBC AUTO: 94.5 FL (ref 79–97)
MONOCYTES # BLD AUTO: 0.6 10*3/MM3 (ref 0.1–0.9)
MONOCYTES NFR BLD AUTO: 10.2 % (ref 5–12)
NEUTROPHILS NFR BLD AUTO: 2.88 10*3/MM3 (ref 1.7–7)
NEUTROPHILS NFR BLD AUTO: 49.2 % (ref 42.7–76)
NRBC BLD AUTO-RTO: 0 /100 WBC (ref 0–0.2)
PLATELET # BLD AUTO: 337 10*3/MM3 (ref 140–450)
PMV BLD AUTO: 10.4 FL (ref 6–12)
POTASSIUM SERPL-SCNC: 3.7 MMOL/L (ref 3.5–5.2)
PROT SERPL-MCNC: 6.7 G/DL (ref 6–8.5)
RBC # BLD AUTO: 4.34 10*6/MM3 (ref 3.77–5.28)
SODIUM SERPL-SCNC: 140 MMOL/L (ref 136–145)
TRIGL SERPL-MCNC: 185 MG/DL (ref 0–150)
TSH SERPL DL<=0.05 MIU/L-ACNC: 1.43 UIU/ML (ref 0.27–4.2)
VLDLC SERPL-MCNC: 32 MG/DL (ref 5–40)
WBC NRBC COR # BLD: 5.86 10*3/MM3 (ref 3.4–10.8)

## 2022-12-20 PROCEDURE — 36415 COLL VENOUS BLD VENIPUNCTURE: CPT | Performed by: FAMILY MEDICINE

## 2022-12-20 PROCEDURE — 85025 COMPLETE CBC W/AUTO DIFF WBC: CPT | Performed by: FAMILY MEDICINE

## 2022-12-20 PROCEDURE — 80061 LIPID PANEL: CPT | Performed by: FAMILY MEDICINE

## 2022-12-20 PROCEDURE — 99203 OFFICE O/P NEW LOW 30 MIN: CPT | Performed by: FAMILY MEDICINE

## 2022-12-20 PROCEDURE — 84443 ASSAY THYROID STIM HORMONE: CPT | Performed by: FAMILY MEDICINE

## 2022-12-20 PROCEDURE — 80053 COMPREHEN METABOLIC PANEL: CPT | Performed by: FAMILY MEDICINE

## 2022-12-20 PROCEDURE — 86803 HEPATITIS C AB TEST: CPT | Performed by: FAMILY MEDICINE

## 2022-12-20 RX ORDER — TOPIRAMATE 100 MG/1
100 TABLET, FILM COATED ORAL 2 TIMES DAILY
COMMUNITY

## 2022-12-20 NOTE — PROGRESS NOTES
Venipuncture Blood Specimen Collection  Venipuncture performed in left arm  by Tiffanie Lynne with good hemostasis. Patient tolerated the procedure well without complications.   12/20/22   Tiffanie Lynne

## 2022-12-20 NOTE — PROGRESS NOTES
"Chief Complaint    Establish Care    Subjective      Laxmi Jc presents to [facility]    History of Present Illness    1.) ESTABLISH CARE : Patient presents with past medical history significant for CREST, anxiety, palpitations, OAB.  She is followed by endocrinology, rheumatology and currently neurology (hx of pituitary adenoma with recommendation for follow-up MRI every 6 months -currently being treated by neurology for headaches and 'white matter' per patient).  No recent primary care labs.    Objective     Vital Signs:     /72 (BP Location: Left arm, Patient Position: Sitting, Cuff Size: Adult)   Temp 97.3 °F (36.3 °C) (Temporal)   Ht 157.5 cm (62\")   Wt 74.1 kg (163 lb 6.4 oz)   BMI 29.89 kg/m²       Physical Exam  Vitals reviewed.   Constitutional:       General: She is not in acute distress.     Appearance: Normal appearance. She is well-developed.   HENT:      Head: Normocephalic and atraumatic.      Right Ear: Hearing and external ear normal. Tympanic membrane is not bulging.      Left Ear: Hearing and external ear normal. Tympanic membrane is not bulging.      Nose: Nose normal. No congestion.   Eyes:      General: Lids are normal.         Right eye: No discharge.         Left eye: No discharge.      Conjunctiva/sclera: Conjunctivae normal.   Cardiovascular:      Rate and Rhythm: Normal rate and regular rhythm.   Pulmonary:      Effort: Pulmonary effort is normal.      Breath sounds: Normal breath sounds.   Abdominal:      General: There is no distension.   Musculoskeletal:         General: No swelling.      Cervical back: Neck supple.   Skin:     Coloration: Skin is not jaundiced.      Findings: No erythema.   Neurological:      Mental Status: She is alert. Mental status is at baseline.   Psychiatric:         Mood and Affect: Mood and affect normal.         Thought Content: Thought content normal.     Assessment and Plan     Diagnoses and all orders for " this visit:    1. Encounter to establish care (Primary)  Comments:  Labs as noted. Additional recs per review.     2. Medication management  -     CBC & Differential  -     Comprehensive Metabolic Panel    3. Encounter for hepatitis C screening test for low risk patient  -     Hepatitis C Antibody    4. Screening for thyroid disorder  -     TSH    5. Screening for cholesterol level  -     Lipid Panel    6. Pituitary adenoma (HCC)  -     MRI Brain With & Without Contrast; Future      Follow Up     Return in about 6 months (around 6/20/2023).    Patient was given instructions and counseling regarding her condition or for health maintenance advice. Please see specific information pulled into the AVS if appropriate.

## 2022-12-21 ENCOUNTER — OFFICE VISIT (OUTPATIENT)
Dept: OTOLARYNGOLOGY | Facility: CLINIC | Age: 42
End: 2022-12-21

## 2022-12-21 VITALS — WEIGHT: 161.6 LBS | BODY MASS INDEX: 29.74 KG/M2 | HEIGHT: 62 IN | TEMPERATURE: 97.1 F

## 2022-12-21 DIAGNOSIS — G47.30 SLEEP-DISORDERED BREATHING: Primary | ICD-10-CM

## 2022-12-21 DIAGNOSIS — L29.9 EAR ITCHING: ICD-10-CM

## 2022-12-21 PROCEDURE — 99204 OFFICE O/P NEW MOD 45 MIN: CPT | Performed by: OTOLARYNGOLOGY

## 2022-12-21 PROCEDURE — 31575 DIAGNOSTIC LARYNGOSCOPY: CPT | Performed by: OTOLARYNGOLOGY

## 2022-12-21 RX ORDER — FLUOCINOLONE ACETONIDE 0.11 MG/ML
OIL AURICULAR (OTIC) 2 TIMES DAILY
Qty: 20 ML | Refills: 3 | Status: SHIPPED | OUTPATIENT
Start: 2022-12-21 | End: 2023-01-20

## 2022-12-21 NOTE — PROGRESS NOTES
"Patient Name: Laxmi Jc   Visit Date: 12/21/2022   Patient ID: [id number]  Provider: Edward Sow MD    Sex: female  Location: [facility]   YOB: 1980  Location Address: [address] [place] [address], [address],?[place]?[address]    Primary Care Provider Bonnie Vaca DO  Location Phone: [phone]    Referring Provider: ISAAC Cho        Chief Complaint  \"Feels like throat closing\"    History of Present Illness  Laxmi Jc is a 42 y.o. female with past medical history significant for CREST who presents to [facility] today as a consult from ISAAC Cho for evaluation of her throat.  She tells me that over the last year she has felt as though her throat \"closes up\" when she lays down and is going to sleep at night.  This happens just before she is asleep right often feels as though she has difficulty exhaling.  It is not associated with any coughing or choking.  She is able to open her mouth and breathe easily.  Her position while she sleeps does not tend to affect this.  She has not had any issues with heartburn, hoarseness, or dysphagia.  She does mention that her ears itch and feel moist frequently.  She has not undergone a swallow study.  She has an approximate 3-pack-year smoker currently smoking 1/2 pack/day.       Past Medical History:   Diagnosis Date   • Anxiety    • Low back pain        Past Surgical History:   Procedure Laterality Date   • BREAST LUMPECTOMY     • FAT GRAFTING Left     repair of lumpectomy   • KIDNEY STONE SURGERY     • OVARIAN CYST REMOVAL           Current Outpatient Medications:   •  metoprolol succinate XL (TOPROL-XL) 25 MG 24 hr tablet, Take 1 tablet by mouth Daily., Disp: 90 tablet, Rfl: 3  •  Norditropin FlexPro 5 MG/1.5ML solution pen-injector, , Disp: , Rfl:   •  oxybutynin XL (DITROPAN-XL) 5 MG 24 hr tablet, Take 1 tablet by mouth Daily., Disp: 30 tablet, Rfl: 11  •  topiramate " "(TOPAMAX) 100 MG tablet, Take 100 mg by mouth 2 (Two) Times a Day., Disp: , Rfl:   •  fexofenadine (ALLEGRA) 60 MG tablet, Take 60 mg by mouth Daily. Takes seasonally, Disp: , Rfl:   •  fluocinolone acetonide (DERMOTIC) 0.01 % oil otic oil, Administer  into both ears 2 (Two) Times a Day for 30 days., Disp: 20 mL, Rfl: 3     Allergies   Allergen Reactions   • Penicillins Other (See Comments)   • Sulfa Antibiotics Other (See Comments)       Social History     Tobacco Use   • Smoking status: Every Day     Packs/day: 0.50     Years: 4.00     Pack years: 2.00     Types: Cigarettes   • Smokeless tobacco: Never   Vaping Use   • Vaping Use: Never used   Substance Use Topics   • Alcohol use: Never   • Drug use: Never        Objective     Vital Signs:   Temp 97.1 °F (36.2 °C) (Temporal)   Ht 157.5 cm (62\")   Wt 73.3 kg (161 lb 9.6 oz)   BMI 29.56 kg/m²       Physical Exam    General: Well developed, well nourished patient of stated age in no acute distress. Voice is strong and clear.   Head: Normocephalic and atraumatic.  Face: No lesions.  Bilateral parotid and submandibular glands are unremarkable.  Stensen's and Warthin's ducts are productive of clear saliva bilaterally.  House-Brackmann I/VI     bilaterally.   muscles and temporomandibular joint nontender to palpation.  No TMJ crepitus.  Eyes: PERRLA, sclerae anicteric, no conjunctival injection. Extraocular movements are intact and full. No nystagmus.   Ears: Auricles are normal in appearance. Bilateral external auditory canals with desiccated flaky skin. Bilateral tympanic membranes are clear and without effusion. Hearing normal to conversational voice.   Nose: External nose is normal in appearance. Bilateral nares are patent with normal appearing mucosa. Septum midline. Turbinates are unremarkable. No lesions.   Oral Cavity: Lips are normal in appearance. Oral mucosa is unremarkable. Gingiva is unremarkable. Normal dentition for age. Tongue is unremarkable " with good movement. Hard palate is unremarkable.   Oropharynx: Soft palate is unremarkable with full movement. Uvula is unremarkable. Bilateral tonsils are 2+ and cryptic. Posterior oropharynx is unremarkable.    Larynx and hypopharynx: Deferred secondary to gag reflex.  Neck: Supple.  No mass.  Nontender to palpation.  Trachea midline. Thyroid normal size and without nodules to palpation.   Lymphatic: No lymphadenopathy upon palpation.  Respiratory: Clear to auscultation bilaterally, nonlabored respirations    Cardiovascular: RRR, no murmurs, rubs, or gallops,   Psychiatric: Appropriate affect, cooperative   Neurologic: Oriented x 3, strength symmetric in all extremities, Cranial Nerves II-XII are grossly intact to confrontation   Skin: Warm and dry. No rashes.    Procedures     Procedure: Flexible fiberoptic nasolaryngoscopy.    Indications: Sleep disordered breathing in a smoker.    Summary: The patient's bilateral nares were decongested and anesthetized with Afrin and lidocaine sprays respectively. After giving the medications ample time to take effect flexible fiberoptic scope was inserted in the patient's right naris revealing a normal-appearing nasal cavity and nasopharynx. The base of tongue, vallecula, epiglottis, aryepiglottic folds, and arytenoid cartilages are all normal-appearing aside from mild cobblestoning the posterior pharynx. The piriform sinuses were normal in appearance. The bilateral false and true vocal folds are normal in appearance and adducted and abducted normally. The subglottis was widely patent. The patient tolerated the procedure well.      Result Review :{Labs  Result Review  Imaging  Med Tab  Media  Procedures :23}               Assessment and Plan    Diagnoses and all orders for this visit:    1. Sleep-disordered breathing (Primary)  -     Ambulatory Referral to Sleep Medicine    2. Ear itching    Other orders  -     fluocinolone acetonide (DERMOTIC) 0.01 % oil otic oil;  Administer  into both ears 2 (Two) Times a Day for 30 days.  Dispense: 20 mL; Refill: 3      Impressions and findings were discussed at great length.  Currently, she is seen for evaluation of episodes where she feels as though she is having significant difficulty exhaling while lying in bed and going to sleep at night.  She does not startle awake and they are not associated with any coughing or choking.  She is otherwise asymptomatic.  Examination today is unremarkable aside from cobblestoning of the posterior pharynx concerning for irritation secondary to GERD and dry external auditory canal skin.  She does report frequent ear itching.  We discussed the differential for her breathing issues.  This does not seem consistent with laryngospasm but we did discuss my concern for obstructive sleep apnea syndrome although she experiences the symptoms while falling asleep.  Options for further evaluation management were discussed and we will pursue a sleep study.  She was given ample time to ask questions, all of which were answered to her satisfaction.      Follow Up   No follow-ups on file.  Patient was given instructions and counseling regarding her condition or for health maintenance advice. Please see specific information pulled into the AVS if appropriate.

## 2022-12-22 ENCOUNTER — PATIENT ROUNDING (BHMG ONLY) (OUTPATIENT)
Dept: FAMILY MEDICINE CLINIC | Facility: CLINIC | Age: 42
End: 2022-12-22

## 2022-12-22 NOTE — PROGRESS NOTES
My name is Adama Hatch      I am  with [facility]  [facility]  [address] [place] [address] [address] [place] [id number] [phone].    I am writing to officially welcome you to our practice and ask about your recent visit.    Tell me about your visit with us. What things went well?       We're always looking for ways to make our patients' experiences even better. Do you have recommendations on ways we may improve?      Overall were you satisfied with your first visit to our practice?        I appreciate you taking the time to respond with me today. Is there anything else I can do for you?     Thank you, and have a great day.

## 2023-01-12 ENCOUNTER — TELEPHONE (OUTPATIENT)
Dept: FAMILY MEDICINE CLINIC | Facility: CLINIC | Age: 43
End: 2023-01-12
Payer: OTHER GOVERNMENT

## 2023-01-12 NOTE — TELEPHONE ENCOUNTER
1/12/23  Pt was scheduled for Jan with [facility] imaging but no showed.   I called pt and left VM to return call regarding MRI brain and to see if pt was going to reschedule.

## 2023-03-04 ENCOUNTER — PATIENT MESSAGE (OUTPATIENT)
Dept: FAMILY MEDICINE CLINIC | Facility: CLINIC | Age: 43
End: 2023-03-04
Payer: OTHER GOVERNMENT

## 2023-03-04 DIAGNOSIS — M25.512 ACUTE PAIN OF LEFT SHOULDER: Primary | ICD-10-CM

## 2023-03-06 ENCOUNTER — OFFICE VISIT (OUTPATIENT)
Dept: FAMILY MEDICINE CLINIC | Facility: CLINIC | Age: 43
End: 2023-03-06
Payer: OTHER GOVERNMENT

## 2023-03-06 VITALS
HEIGHT: 62 IN | DIASTOLIC BLOOD PRESSURE: 68 MMHG | BODY MASS INDEX: 28.01 KG/M2 | WEIGHT: 152.2 LBS | SYSTOLIC BLOOD PRESSURE: 112 MMHG | HEART RATE: 70 BPM | TEMPERATURE: 98 F

## 2023-03-06 DIAGNOSIS — R10.10 UPPER ABDOMINAL PAIN: ICD-10-CM

## 2023-03-06 DIAGNOSIS — Z97.5 IUD (INTRAUTERINE DEVICE) IN PLACE: ICD-10-CM

## 2023-03-06 DIAGNOSIS — F17.200 CURRENT SMOKER: Primary | ICD-10-CM

## 2023-03-06 PROCEDURE — 99214 OFFICE O/P EST MOD 30 MIN: CPT | Performed by: FAMILY MEDICINE

## 2023-03-06 RX ORDER — VARENICLINE TARTRATE 0.5 MG/1
TABLET, FILM COATED ORAL
Qty: 105 TABLET | Refills: 0 | Status: SHIPPED | OUTPATIENT
Start: 2023-03-06 | End: 2023-03-24

## 2023-03-06 RX ORDER — NICOTINE 21 MG/24HR
1 PATCH, TRANSDERMAL 24 HOURS TRANSDERMAL EVERY 24 HOURS
Qty: 30 PATCH | Refills: 0 | Status: SHIPPED | OUTPATIENT
Start: 2023-03-06 | End: 2023-03-24

## 2023-03-06 NOTE — PROGRESS NOTES
"Chief Complaint    Nicotine Dependence (Would like to discuss smoking cessation options) and Contraception (Issue with IUD since it was put in.  /)    Subjective      Laxmi Jc presents to [facility]    History of Present Illness    1.) NICOTINE DEPENDENCE : Patient currently smokes 1 pack + a day.  She notes that she has been smoking for 5 years.  Prior attempts at quitting with Wellbutrin - used x 1 week.  Patient notes intolerance of the medication due to mood changes.    2.) IUD CONCERN : Placed per gynecology.  Patient notes prior issues with the IUD including hives, which she she was advised can sometimes happen secondary to intolerance of hormones.  She reports that she had discussed this with her gynecologist, where she was advised that some of her ensuing were likely not secondary to the IUD.  She notes that she would like to have the IUD removed and undergo an ablation.    3.) LEFT-SIDED PAIN : Onset  - unclear.  Patient reports at the end of the visit that she has been experiencing left-sided pain that radiates from her left shoulder down to her left upper abdominal region.  Intermittent.    Objective     Vital Signs:     /68 (BP Location: Left arm, Patient Position: Sitting, Cuff Size: Adult)   Pulse 70   Temp 98 °F (36.7 °C) (Temporal)   Ht 157.5 cm (62\")   Wt 69 kg (152 lb 3.2 oz)   BMI 27.84 kg/m²       Physical Exam  Vitals reviewed.   Constitutional:       General: She is not in acute distress.     Appearance: Normal appearance. She is well-developed.   HENT:      Head: Normocephalic and atraumatic.      Right Ear: Hearing and external ear normal.      Left Ear: Hearing and external ear normal.      Nose: Nose normal.   Eyes:      General: Lids are normal.         Right eye: No discharge.         Left eye: No discharge.      Conjunctiva/sclera: Conjunctivae normal.   Pulmonary:      Effort: Pulmonary effort is normal.   Abdominal:      General: " There is no distension.   Musculoskeletal:         General: No swelling.      Cervical back: Neck supple.   Skin:     Coloration: Skin is not jaundiced.      Findings: No erythema.   Neurological:      Mental Status: She is alert. Mental status is at baseline.   Psychiatric:         Mood and Affect: Mood and affect normal.         Thought Content: Thought content normal.     Assessment and Plan     Diagnoses and all orders for this visit:    1. Current smoker (Primary)  Comments:  Start Chantix + nicotine transdermal as noted. Side-effects of Chantix discussed. Discontinue medication, if any concern and contact ofc.    2. IUD (intrauterine device) in place  Comments:  Advised to discuss w/ gynecology, as this ofc does not remove IUD. Patient notes that her IDU has been checked per gyn and is in place.    3. Upper abdominal pain  Comments:  Suspect MSK etiology. US abdomen ordered as noted.   Orders:  -     US Abdomen Limited; Future    Other orders  -     varenicline (Chantix) 0.5 MG tablet; Take 1 tablet by mouth Daily for 3 days, THEN 1 tablet 2 (Two) Times a Day for 3 days, THEN 2 tablets 2 (Two) Times a Day for 24 days.  Dispense: 105 tablet; Refill: 0  -     nicotine (NICODERM CQ) 21 MG/24HR patch; Place 1 patch on the skin as directed by provider Daily for 30 days.  Dispense: 30 patch; Refill: 0    Follow Up     Return in about 1 month (around 4/6/2023).    Patient was given instructions and counseling regarding her condition or for health maintenance advice. Please see specific information pulled into the AVS if appropriate.

## 2023-03-24 RX ORDER — VARENICLINE TARTRATE 1 MG/1
1 TABLET, FILM COATED ORAL 2 TIMES DAILY
Qty: 60 TABLET | Refills: 0 | Status: SHIPPED | OUTPATIENT
Start: 2023-03-24 | End: 2023-04-23

## 2023-03-24 RX ORDER — NICOTINE 21 MG/24HR
1 PATCH, TRANSDERMAL 24 HOURS TRANSDERMAL EVERY 24 HOURS
Qty: 30 PATCH | Refills: 0 | Status: SHIPPED | OUTPATIENT
Start: 2023-03-24 | End: 2023-04-23

## 2023-04-07 ENCOUNTER — HOSPITAL ENCOUNTER (OUTPATIENT)
Dept: GENERAL RADIOLOGY | Facility: HOSPITAL | Age: 43
Discharge: HOME | End: 2023-04-07
Payer: OTHER GOVERNMENT

## 2023-04-07 ENCOUNTER — HOSPITAL ENCOUNTER (OUTPATIENT)
Dept: ULTRASOUND IMAGING | Facility: HOSPITAL | Age: 43
Discharge: HOME | End: 2023-04-07
Payer: OTHER GOVERNMENT

## 2023-04-07 DIAGNOSIS — N20.0 RENAL STONES: ICD-10-CM

## 2023-04-07 DIAGNOSIS — R10.10 UPPER ABDOMINAL PAIN: ICD-10-CM

## 2023-04-07 DIAGNOSIS — M25.512 ACUTE PAIN OF LEFT SHOULDER: ICD-10-CM

## 2023-04-07 PROCEDURE — 76705 ECHO EXAM OF ABDOMEN: CPT

## 2023-04-07 PROCEDURE — 73030 X-RAY EXAM OF SHOULDER: CPT

## 2023-04-07 PROCEDURE — 74018 RADEX ABDOMEN 1 VIEW: CPT

## 2023-04-10 DIAGNOSIS — M25.512 LEFT SHOULDER PAIN, UNSPECIFIED CHRONICITY: Primary | ICD-10-CM

## 2023-04-10 DIAGNOSIS — M25.619 LIMITED RANGE OF MOTION OF SHOULDER: ICD-10-CM

## 2023-04-12 RX ORDER — VARENICLINE TARTRATE 1 MG/1
TABLET, FILM COATED ORAL
Qty: 60 TABLET | Refills: 11 | OUTPATIENT
Start: 2023-04-12

## 2023-04-19 ENCOUNTER — TELEPHONE (OUTPATIENT)
Dept: FAMILY MEDICINE CLINIC | Facility: CLINIC | Age: 43
End: 2023-04-19
Payer: OTHER GOVERNMENT

## 2023-04-19 ENCOUNTER — OFFICE VISIT (OUTPATIENT)
Dept: FAMILY MEDICINE CLINIC | Facility: CLINIC | Age: 43
End: 2023-04-19
Payer: OTHER GOVERNMENT

## 2023-04-19 VITALS
WEIGHT: 153 LBS | TEMPERATURE: 97.8 F | OXYGEN SATURATION: 99 % | HEART RATE: 110 BPM | BODY MASS INDEX: 27.98 KG/M2 | DIASTOLIC BLOOD PRESSURE: 80 MMHG | SYSTOLIC BLOOD PRESSURE: 120 MMHG

## 2023-04-19 DIAGNOSIS — M25.512 CHRONIC LEFT SHOULDER PAIN: Primary | ICD-10-CM

## 2023-04-19 DIAGNOSIS — M25.619 LIMITED RANGE OF MOTION OF SHOULDER: ICD-10-CM

## 2023-04-19 DIAGNOSIS — G89.29 CHRONIC LEFT SHOULDER PAIN: Primary | ICD-10-CM

## 2023-04-19 PROCEDURE — 99213 OFFICE O/P EST LOW 20 MIN: CPT | Performed by: NURSE PRACTITIONER

## 2023-04-19 RX ORDER — METHYLPREDNISOLONE 4 MG/1
TABLET ORAL
Qty: 1 EACH | Refills: 0 | Status: SHIPPED | OUTPATIENT
Start: 2023-04-19

## 2023-04-19 RX ORDER — TIZANIDINE 4 MG/1
4 TABLET ORAL NIGHTLY PRN
Qty: 20 TABLET | Refills: 0 | Status: SHIPPED | OUTPATIENT
Start: 2023-04-19

## 2023-04-19 RX ORDER — MENTHOL 40 MG/ML
1 GEL TOPICAL 4 TIMES DAILY PRN
Qty: 1 EACH | Refills: 2 | Status: SHIPPED | OUTPATIENT
Start: 2023-04-19

## 2023-04-19 NOTE — TELEPHONE ENCOUNTER
Pt states waiting for ortho referral for lt shoulder. I'm not seeing an order yet. Could you be so kind to place an order? Please and Thank you.

## 2023-04-19 NOTE — PROGRESS NOTES
Chief Complaint  Shoulder Pain (Follow up on LT shoulder pain )    Subjective            Laxmi Jc presents to [facility]  History of Present Illness     Laxmi presents to the office today with complaints of shoulder pain, on the left. She injured it during a [employer] class. The pain was almost instantaneous when it occurred. She has iced it, used IBU, rested it. The pain is in the joint, in the anterior aspect of the shoulder, and it hurts from the shoulder down to mid-bicep region. She describes the pain as sharp, burning, and sometimes it feels like her muscle gets stuck when she moves a certain way.     She injured the shoulder approx. 6 weeks ago. She was seen approx. 4 days after the injury in this clinic.  At the time of her visit she was told that her symptoms were likely musculoskeletal.  X-ray was not performed at the time of visit.  She continued to have symptoms for a few weeks following the visit and an x-ray was ordered on 3/31/2023. X-ray which showed no acute findings.  She has continued to have pain with limited range of motion.  On 4/10 she again contacted the office and requested an MRI.  The MRI has been ordered and is currently arranged for next week.  She is also awaiting referral to orthopedic surgery.  That too was placed, but she has not heard anything about the referral.    Past Medical History:   Diagnosis Date   • Anxiety    • Clotting disorder     Periods every 10 days   • Low back pain        Allergies   Allergen Reactions   • Penicillins Other (See Comments)   • Sulfa Antibiotics Other (See Comments)        Past Surgical History:   Procedure Laterality Date   • BREAST LUMPECTOMY     • FAT GRAFTING Left     repair of lumpectomy   • KIDNEY STONE SURGERY     • OVARIAN CYST REMOVAL          Social History     Tobacco Use   • Smoking status: Former     Packs/day: 1.50     Years: 5.00     Pack years: 7.50     Types: Cigarettes     Start date: 2018      Quit date:      Years since quittin.2   • Smokeless tobacco: Never   Vaping Use   • Vaping Use: Never used   Substance Use Topics   • Alcohol use: Never   • Drug use: Never       Family History   Problem Relation Age of Onset   • Cancer Mother         Colon   • Kidney disease Mother    • Hepatitis Mother    • Autoimmune disease Mother    • Bleeding Disorder Mother    • Hyperlipidemia Father    • Hypertension Father    • Diabetes Father    • Stroke Father    • Cancer Maternal Grandmother         Stomach        Health Maintenance Due   Topic Date Due   • COVID-19 Vaccine (1) Never done   • TDAP/TD VACCINES (1 - Tdap) Never done   • ANNUAL PHYSICAL  Never done   • PAP SMEAR  Never done        Current Outpatient Medications on File Prior to Visit   Medication Sig   • fexofenadine (ALLEGRA) 60 MG tablet Take 1 tablet by mouth Daily. Takes seasonally   • metoprolol succinate XL (TOPROL-XL) 25 MG 24 hr tablet Take 1 tablet by mouth Daily.   • nicotine (Nicoderm CQ) 14 MG/24HR patch Place 1 patch on the skin as directed by provider Daily for 30 days.   • Norditropin FlexPro 5 MG/1.5ML solution pen-injector    • oxybutynin XL (DITROPAN-XL) 5 MG 24 hr tablet Take 1 tablet by mouth Daily.   • topiramate (TOPAMAX) 100 MG tablet Take 1 tablet by mouth 2 (Two) Times a Day.   • varenicline (CHANTIX) 1 MG tablet Take 1 tablet by mouth 2 (Two) Times a Day for 30 days.     No current facility-administered medications on file prior to visit.         There is no immunization history on file for this patient.    Review of Systems     Objective     /80   Pulse 110   Temp 97.8 °F (36.6 °C)   Wt 69.4 kg (153 lb)   SpO2 99%   BMI 27.98 kg/m²       Physical Exam  Vitals reviewed.   Constitutional:       General: She is not in acute distress.     Appearance: She is well-developed and overweight.   HENT:      Head: Normocephalic and atraumatic.   Eyes:      General: No scleral icterus.     Extraocular Movements: Extraocular  movements intact.      Conjunctiva/sclera: Conjunctivae normal.   Neck:      Thyroid: No thyroid mass, thyromegaly or thyroid tenderness.      Vascular: No carotid bruit.      Trachea: Trachea normal.   Cardiovascular:      Rate and Rhythm: Normal rate and regular rhythm.      Pulses: Normal pulses.      Heart sounds: No murmur heard.  Pulmonary:      Effort: Pulmonary effort is normal. No respiratory distress.      Breath sounds: Normal breath sounds. No wheezing, rhonchi or rales.   Musculoskeletal:      Left shoulder: Tenderness present. No swelling, deformity, effusion or bony tenderness. Decreased range of motion.        Arms:       Cervical back: Normal range of motion. No signs of trauma, torticollis or crepitus. Muscular tenderness (to the left ) present. No pain with movement or spinous process tenderness. Normal range of motion.   Skin:     General: Skin is warm and dry.   Neurological:      Mental Status: She is alert and oriented to person, place, and time.   Psychiatric:         Mood and Affect: Mood and affect normal.         Behavior: Behavior normal.         Thought Content: Thought content normal.         Judgment: Judgment normal.         Result Review :     The following data was reviewed by: ISAAC Hickey on 04/19/2023:        Data reviewed: Radiologic studies : X-ray left shoulder and : OV with Dr. Vaca   Office Visit with Bonnie Vaca DO (03/06/2023)  XR Shoulder 2+ View Left (04/07/2023 13:34)           Assessment and Plan      Diagnoses and all orders for this visit:    1. Chronic left shoulder pain (Primary)  -     methylPREDNISolone (MEDROL) 4 MG dose pack; Take as directed on package instructions.  Dispense: 1 each; Refill: 0  -     tiZANidine (ZANAFLEX) 4 MG tablet; Take 1 tablet by mouth At Night As Needed for Muscle Spasms.  Dispense: 20 tablet; Refill: 0  -     Menthol, Topical Analgesic, (Biofreeze Roll-On) 4 % gel; Apply 1 application topically 4 (Four) Times a Day As  Needed (left shoulder pain).  Dispense: 1 each; Refill: 2    2. Limited range of motion of shoulder  -     methylPREDNISolone (MEDROL) 4 MG dose pack; Take as directed on package instructions.  Dispense: 1 each; Refill: 0  -     tiZANidine (ZANAFLEX) 4 MG tablet; Take 1 tablet by mouth At Night As Needed for Muscle Spasms.  Dispense: 20 tablet; Refill: 0  -     Menthol, Topical Analgesic, (Biofreeze Roll-On) 4 % gel; Apply 1 application topically 4 (Four) Times a Day As Needed (left shoulder pain).  Dispense: 1 each; Refill: 2            Follow Up     I am going to give her a steroid pack, with muscle relaxer to use at night, as well as Biofreeze.  Encouraged rest of the shoulder, even if shoulder appears to be improving with medication.  We will get her MRI for her next week and see if we can expedite her referral to orthopedic surgery as symptoms have been present for at least the past 6 weeks now.    Patient was given instructions and counseling regarding her condition or for health maintenance advice. Please see specific information pulled into the AVS if appropriate.

## 2023-04-26 ENCOUNTER — HOSPITAL ENCOUNTER (OUTPATIENT)
Dept: MRI IMAGING | Facility: HOSPITAL | Age: 43
Discharge: HOME | End: 2023-04-26
Payer: OTHER GOVERNMENT

## 2023-04-26 DIAGNOSIS — M25.512 LEFT SHOULDER PAIN, UNSPECIFIED CHRONICITY: ICD-10-CM

## 2023-04-26 DIAGNOSIS — M25.619 LIMITED RANGE OF MOTION OF SHOULDER: ICD-10-CM

## 2023-04-26 PROCEDURE — 73221 MRI JOINT UPR EXTREM W/O DYE: CPT

## 2023-04-27 ENCOUNTER — PATIENT MESSAGE (OUTPATIENT)
Dept: FAMILY MEDICINE CLINIC | Facility: CLINIC | Age: 43
End: 2023-04-27
Payer: OTHER GOVERNMENT

## 2023-04-28 ENCOUNTER — OFFICE VISIT (OUTPATIENT)
Dept: ORTHOPEDIC SURGERY | Facility: CLINIC | Age: 43
End: 2023-04-28
Payer: OTHER GOVERNMENT

## 2023-04-28 VITALS — HEIGHT: 62 IN | HEART RATE: 84 BPM | OXYGEN SATURATION: 98 % | BODY MASS INDEX: 28.23 KG/M2 | WEIGHT: 153.4 LBS

## 2023-04-28 DIAGNOSIS — M75.52 BURSITIS OF LEFT SHOULDER: ICD-10-CM

## 2023-04-28 DIAGNOSIS — M19.019 OSTEOARTHRITIS OF AC (ACROMIOCLAVICULAR) JOINT: ICD-10-CM

## 2023-04-28 DIAGNOSIS — M75.22 BICEPS TENDINITIS OF LEFT UPPER EXTREMITY: ICD-10-CM

## 2023-04-28 DIAGNOSIS — M19.012 PRIMARY OSTEOARTHRITIS OF LEFT SHOULDER: ICD-10-CM

## 2023-04-28 DIAGNOSIS — M75.102 TEAR OF LEFT ROTATOR CUFF, UNSPECIFIED TEAR EXTENT, UNSPECIFIED WHETHER TRAUMATIC: Primary | ICD-10-CM

## 2023-04-28 DIAGNOSIS — M24.012 LOOSE BODY IN SHOULDER JOINT, LEFT: ICD-10-CM

## 2023-04-28 DIAGNOSIS — M75.82 ROTATOR CUFF TENDONITIS, LEFT: ICD-10-CM

## 2023-04-28 RX ORDER — TRIAMCINOLONE ACETONIDE 40 MG/ML
40 INJECTION, SUSPENSION INTRA-ARTICULAR; INTRAMUSCULAR
Status: COMPLETED | OUTPATIENT
Start: 2023-04-28 | End: 2023-04-28

## 2023-04-28 RX ORDER — LIDOCAINE HYDROCHLORIDE 10 MG/ML
5 INJECTION, SOLUTION EPIDURAL; INFILTRATION; INTRACAUDAL; PERINEURAL
Status: COMPLETED | OUTPATIENT
Start: 2023-04-28 | End: 2023-04-28

## 2023-04-28 RX ADMIN — TRIAMCINOLONE ACETONIDE 40 MG: 40 INJECTION, SUSPENSION INTRA-ARTICULAR; INTRAMUSCULAR at 09:18

## 2023-04-28 RX ADMIN — LIDOCAINE HYDROCHLORIDE 5 ML: 10 INJECTION, SOLUTION EPIDURAL; INFILTRATION; INTRACAUDAL; PERINEURAL at 09:18

## 2023-04-28 NOTE — PROGRESS NOTES
"Chief Complaint  Initial Evaluation of the Left Shoulder     Subjective      Laxmi Jc presents to [facility] for evaluation of the left shoulder. The patient reports left shoulder pain. She reports something happened to her shoulder in jujitsu. She reports pain with certain motion of her shoulder. She takes ibuprofen for the pain. She locates pain deep to the anterior shoulder and latera arm. She reports locking of the shoulder.     Allergies   Allergen Reactions   • Penicillins Other (See Comments)   • Sulfa Antibiotics Other (See Comments)        Social History     Socioeconomic History   • Marital status:    Tobacco Use   • Smoking status: Former     Packs/day: 1.50     Years: 5.00     Pack years: 7.50     Types: Cigarettes     Start date:      Quit date:      Years since quittin.3   • Smokeless tobacco: Never   Vaping Use   • Vaping Use: Never used   Substance and Sexual Activity   • Alcohol use: Never   • Drug use: Never   • Sexual activity: Defer        Review of Systems     Objective   Vital Signs:   Pulse 84   Ht 157.5 cm (62\")   Wt 69.6 kg (153 lb 6.4 oz)   SpO2 98%   BMI 28.06 kg/m²       Physical Exam  Constitutional:       Appearance: Normal appearance. The patient is well-developed and normal weight.   HENT:      Head: Normocephalic.      Right Ear: Hearing and external ear normal.      Left Ear: Hearing and external ear normal.      Nose: Nose normal.   Eyes:      Conjunctiva/sclera: Conjunctivae normal.   Cardiovascular:      Rate and Rhythm: Normal rate.   Pulmonary:      Effort: Pulmonary effort is normal.      Breath sounds: No wheezing or rales.   Abdominal:      Palpations: Abdomen is soft.      Tenderness: There is no abdominal tenderness.   Musculoskeletal:      Cervical back: Normal range of motion.   Skin:     Findings: No rash.   Neurological:      Mental Status: The patient is alert and oriented to person, place, and time. "   Psychiatric:         Mood and Affect: Mood and affect normal.         Judgment: Judgment normal.       Ortho Exam      Left shoulder- Sensation to light touch median, radial, ulnar nerve. Positive AIN, PIN, ulnar nerve motor function. Positive pulses. No skin discoloration, atrophy or swelling. Tender to the proximal biceps. Forward elevation 120 degrees. Abduction 90. Er 60. Internal rotation 50. 4+ supraspinatus strength, Neurovascularly intact. 5/5 infraspinatus and subscapularis. Internal rotation to SI joint. Positive impingement signs. Negative cross arm adduction. Pain with speeds.     Large Joint Arthrocentesis  Date/Time: 4/28/2023 9:18 AM  Consent given by: patient  Site marked: site marked  Timeout: Immediately prior to procedure a time out was called to verify the correct patient, procedure, equipment, support staff and site/side marked as required   Supporting Documentation  Indications: pain   Procedure Details  Location: shoulder - Shoulder joint: left.  Needle gauge: 21g.  Medications administered: 5 mL lidocaine PF 1% 1 %; 40 mg triamcinolone acetonide 40 MG/ML  Patient tolerance: patient tolerated the procedure well with no immediate complications            Imaging Results (Most Recent)     None           Result Review :       XR Shoulder 2+ View Left    Result Date: 4/7/2023  Narrative: PROCEDURE: XR SHOULDER 2+ VW LEFT  COMPARISON: None  INDICATIONS: Left shoulder pain  FINDINGS:  There is no acute fracture or dislocation.  The joint spaces are normally aligned and well maintained.  There is no osseous erosion.  The coracoclavicular interval is preserved.  Visualized portions of the left chest wall appear unremarkable.       Impression:   1. No acute osseous abnormality of the left shoulder.       BARB ARRIOLA MD       Electronically Signed and Approved By: BARB ARRIOLA MD on 4/07/2023 at 17:05             MRI Shoulder Left Without Contrast    Result Date: 4/27/2023  Narrative:  PROCEDURE: MRI SHOULDER LEFT WO CONTRAST  COMPARISON: None  INDICATIONS: LEFT SHOULDER PAIN AFTER OSIRISI JICHANELLEU INJURY 6 WEEKS AGO. LIMITED RANGE OF MOTION.      TECHNIQUE: A variety of imaging planes and parameters were utilized for visualization of suspected pathology.  Images were performed without contrast.   FINDINGS:  Mild acromioclavicular and glenohumeral osteoarthritic changes are present.  The labrum appears intact.  Trace joint effusion identified.  Biceps tendon appears intact.  Small amount of fluid is seen along the extracapsular portion of the biceps tendon.  Subcoracoid bursitis is present with a tiny loose body present (series 7, image 13).  Rotator cuff tendinosis is present with intermediate signal changes and thickening.  There is mild partial articular sided tearing of the superior fibers of the subscapularis tendon as well as the infraspinatus tendon at the footplate.  No evidence of complete tear.  No abnormal focal bone marrow signal is seen. The cortical margins are intact. The volume of the rotator cuff musculature is within normal limits. The surrounding soft tissues are unremarkable.      Impression:   1. Rotator cuff tendinosis is present with mild partial articular sided tearing of the infraspinatus tendon as well as the superior fibers of the supraspinatus tendon.  No sizable or complete tear identified. 2. Biceps tenosynovitis. 3. Mild acromioclavicular and glenohumeral osteoarthritis. 4. Trace joint effusion with mild subcoracoid bursitis with a loose body present..      JEREMIAH BARAHONA MD       Electronically Signed and Approved By: JEREMIAH BARAHONA MD on 4/27/2023 at 8:46             US Abdomen Limited    Result Date: 4/7/2023  Narrative: PROCEDURE: US ABDOMEN LIMITED  COMPARISON: None  INDICATIONS: LUQ PAIN  TECHNIQUE: Ultrasound examination of the right upper quadrant of the abdomen was performed.   FINDINGS:  Ultrasound demonstrates the spleen measures 11.3 x 7.3 x 5.2 centimeters.  No  splenic lesion evident.  Exam is under penetrated.  The left kidney appears within normal limits measuring up to about 12 centimeters in length without hydronephrosis.  No ascites seen.  Splenic volume about 224 cubic centimeters.      Impression:   1. Limited ultrasound of the left abdomen demonstrates unremarkable appearing spleen and left kidney.  No significant ascites or other acute finding.     YEHUDA GUTIÉRREZ MD       ELECTRONICALLY SIGNED AND APPROVED BY: YEHUDA GUTIÉRREZ MD ON 4/07/2023 AT 17:02             XR Abdomen KUB    Result Date: 4/7/2023  Narrative: PROCEDURE: XR ABDOMEN KUB  COMPARISON: [facility], CR, XR ABDOMEN KUB, 5/02/2022, 10:31.  INDICATIONS: Kidney stones  FINDINGS:  The bowel gas pattern is unremarkable.  An IUD is seen in the pelvis.  No unusual mass or calcification is seen.  Bony structures appear intact.      Impression:   KUB demonstrating an unremarkable bowel gas pattern.  No unusual mass or calcification is seen.     BRYAN GARIBAY MD       Electronically Signed and Approved By: BRYAN GARIBAY MD on 4/07/2023 at 17:51                      Assessment and Plan     Diagnoses and all orders for this visit:    1. Tear of left rotator cuff, unspecified tear extent, unspecified whether traumatic (Primary)    2. Rotator cuff tendonitis, left    3. Biceps tendinitis of left upper extremity    4. Osteoarthritis of AC (acromioclavicular) joint    5. Primary osteoarthritis of left shoulder    6. Bursitis of left shoulder    7. Loose body in shoulder joint, left        Discussed the treatment options with the patient, operative vs non-operative. We discussed conservative treatment options with the patient. Discussed the risks and benefits of a left shoulder steroid injection. The patient expressed understanding and wished to proceed. She tolerated the injection well. Prescription for Naproxen given today. Order for physical therapy given today.     Call or return if  worsening symptoms.    Follow Up     6-8 weeks      Patient was given instructions and counseling regarding her condition or for health maintenance advice. Please see specific information pulled into the AVS if appropriate.     Scribed for Jv Norton MD by Gin Potter.  04/28/23   08:56 EDT    I have personally performed the services described in this document as scribed by the above individual and it is both accurate and complete. Jv Norton MD 04/28/23

## 2023-04-28 NOTE — TELEPHONE ENCOUNTER
From: Laxmi Jc  To: Misa Chavez  Sent: 4/27/2023 11:54 AM EDT  Subject: Shoulder MRI    Just letting you know I got my shoulder MRI AT [facility] off [address] yesterday evening. Doesn’t look like any results are back yet.

## 2023-05-09 DIAGNOSIS — N32.81 OAB (OVERACTIVE BLADDER): ICD-10-CM

## 2023-05-09 RX ORDER — OXYBUTYNIN CHLORIDE 5 MG/1
TABLET, EXTENDED RELEASE ORAL
Qty: 30 TABLET | Refills: 5 | Status: SHIPPED | OUTPATIENT
Start: 2023-05-09

## 2023-05-18 RX ORDER — SOMATROPIN 5 MG/1.5ML
INJECTION, SOLUTION SUBCUTANEOUS
Qty: 3.6 ML | OUTPATIENT
Start: 2023-05-18

## 2023-05-18 RX ORDER — FEXOFENADINE HYDROCHLORIDE 60 MG/1
60 TABLET, FILM COATED ORAL DAILY
Qty: 90 TABLET | Refills: 3 | Status: SHIPPED | OUTPATIENT
Start: 2023-05-18

## 2023-05-23 DIAGNOSIS — M75.102 TEAR OF LEFT ROTATOR CUFF, UNSPECIFIED TEAR EXTENT, UNSPECIFIED WHETHER TRAUMATIC: Primary | ICD-10-CM

## 2023-05-23 RX ORDER — NAPROXEN 500 MG/1
500 TABLET ORAL 2 TIMES DAILY
Qty: 120 TABLET | Refills: 0 | Status: SHIPPED | OUTPATIENT
Start: 2023-05-23 | End: 2023-08-21

## 2023-06-09 ENCOUNTER — OFFICE VISIT (OUTPATIENT)
Dept: ORTHOPEDIC SURGERY | Facility: CLINIC | Age: 43
End: 2023-06-09
Payer: OTHER GOVERNMENT

## 2023-06-09 VITALS
BODY MASS INDEX: 28.24 KG/M2 | SYSTOLIC BLOOD PRESSURE: 109 MMHG | DIASTOLIC BLOOD PRESSURE: 70 MMHG | HEIGHT: 62 IN | HEART RATE: 79 BPM | WEIGHT: 153.44 LBS | OXYGEN SATURATION: 100 %

## 2023-06-09 DIAGNOSIS — M19.019 OSTEOARTHRITIS OF AC (ACROMIOCLAVICULAR) JOINT: ICD-10-CM

## 2023-06-09 DIAGNOSIS — M75.22 BICEPS TENDINITIS OF LEFT UPPER EXTREMITY: ICD-10-CM

## 2023-06-09 DIAGNOSIS — M75.82 ROTATOR CUFF TENDONITIS, LEFT: ICD-10-CM

## 2023-06-09 DIAGNOSIS — M75.02 ADHESIVE CAPSULITIS OF LEFT SHOULDER: ICD-10-CM

## 2023-06-09 DIAGNOSIS — M24.012 LOOSE BODY IN SHOULDER JOINT, LEFT: ICD-10-CM

## 2023-06-09 DIAGNOSIS — M75.52 BURSITIS OF LEFT SHOULDER: ICD-10-CM

## 2023-06-09 DIAGNOSIS — M19.012 PRIMARY OSTEOARTHRITIS OF LEFT SHOULDER: ICD-10-CM

## 2023-06-09 DIAGNOSIS — M75.102 TEAR OF LEFT ROTATOR CUFF, UNSPECIFIED TEAR EXTENT, UNSPECIFIED WHETHER TRAUMATIC: Primary | ICD-10-CM

## 2023-06-09 RX ORDER — CYCLOBENZAPRINE HCL 10 MG
10 TABLET ORAL 3 TIMES DAILY PRN
Qty: 30 TABLET | Refills: 0 | Status: SHIPPED | OUTPATIENT
Start: 2023-06-09

## 2023-06-09 NOTE — PROGRESS NOTES
Chief Complaint  Follow-up of the Left Shoulder    Subjective          History of Present Illness      Laxmi Jc is a 42 y.o. female  presents to [facility] for     Patient presents for follow-up evaluation of left shoulder pain she was seen by Dr. Norton on 2023 and he reviewed MRI with her that was obtained through her primary care physician.  She had x-rays and MRI prior to her visit.  She had an injury doing jujitsu in late February she had a struggle with her initial primary care physician to get x-rays MRI or referral to our office.  She finally switched providers and was referred to us with MRI and x-ray and place.  Dr. Norton evaluated her, advised her to start therapy and gave her steroid injection.  She states therapy is not helping it has been a waste of money and time for her.  She states naproxen and ibuprofen give her no relief and the injection she had did not help her shoulder pain at all.  She states she is in a lot of pain pain is worse at night.  She admits to stiffness of the shoulder, anterior lateral shoulder pain and she also states she has pain in her bicep region with a cramping/locking sensation of the bicep with flexion and extension.      Allergies   Allergen Reactions    Penicillins Other (See Comments)    Sulfa Antibiotics Other (See Comments) and Unknown (See Comments)    Sulfamethoxazole-Trimethoprim Rash        Social History     Socioeconomic History    Marital status:    Tobacco Use    Smoking status: Former     Packs/day: 1.50     Years: 5.00     Pack years: 7.50     Types: Cigarettes     Start date:      Quit date:      Years since quittin.4    Smokeless tobacco: Never   Vaping Use    Vaping Use: Never used   Substance and Sexual Activity    Alcohol use: Never    Drug use: Never    Sexual activity: Defer        REVIEW OF SYSTEMS    Constitutional: Denies fevers, chills, weight loss  Cardiovascular: Denies chest  "pain, shortness of breath  Skin: Denies rashes, acute skin changes  Neurologic: Denies headache, loss of consciousness  MSK: Left shoulder pain      Objective   Vital Signs:   /70   Pulse 79   Ht 157.5 cm (62\")   Wt 69.6 kg (153 lb 7 oz)   SpO2 100%   BMI 28.06 kg/m²     Body mass index is 28.06 kg/m².    Physical Exam    Left shoulder: Active forward elevation 90, passive forward elevation 95, pain with range of motion, abduction 90, external rotation at side 40, internal rotation to her buttock, 4+ supraspinatus 5 out of 5 infraspinatus and subscapularis, positive impingement testing, pain with crossarm adduction,.    Procedures    Imaging Results (Most Recent)       None             Result Review :   The following data was reviewed by: LORAINE Mcintosh on 06/09/2023:               Assessment and Plan    Diagnoses and all orders for this visit:    1. Tear of left rotator cuff, unspecified tear extent, unspecified whether traumatic (Primary)    2. Rotator cuff tendonitis, left    3. Biceps tendinitis of left upper extremity  -     MRI Humerus Left Without Contrast; Future    4. Osteoarthritis of AC (acromioclavicular) joint    5. Primary osteoarthritis of left shoulder    6. Bursitis of left shoulder    7. Loose body in shoulder joint, left    8. Adhesive capsulitis of left shoulder    Other orders  -     cyclobenzaprine (FLEXERIL) 10 MG tablet; Take 1 tablet by mouth 3 (Three) Times a Day As Needed for Muscle Spasms.  Dispense: 30 tablet; Refill: 0        Based on patient history and physical exam Dr. Norton discussed shoulder scope surgery with her, risk benefits procedure and recovery, due to patient having pain in the bicep region MRI of the left humerus was ordered for further evaluation prior to scheduling surgical intervention.  Patient agreed to have MRI, she was given short course of muscle laxer, she was advised to avoid working driving or operating heavy machinery while taking " muscle relaxer, follow-up with Dr. Norton after MRI.    Discussed surgery., Risks/benefits discussed with patient including, but not limited to: infection, bleeding, neurovascular damage, re-rupture, aesthetic deformity, need for further surgery, and death., and Call or return if worsening symptoms.    Follow Up   Return for After MRI.  Patient was given instructions and counseling regarding her condition or for health maintenance advice. Please see specific information pulled into the AVS if appropriate.

## 2023-07-24 ENCOUNTER — HOSPITAL ENCOUNTER (OUTPATIENT)
Dept: MRI IMAGING | Facility: HOSPITAL | Age: 43
Discharge: HOME | End: 2023-07-24
Admitting: PHYSICIAN ASSISTANT
Payer: OTHER GOVERNMENT

## 2023-07-24 DIAGNOSIS — M75.22 BICEPS TENDINITIS OF LEFT UPPER EXTREMITY: ICD-10-CM

## 2023-07-24 PROCEDURE — 73218 MRI UPPER EXTREMITY W/O DYE: CPT

## 2023-07-31 RX ORDER — CYCLOBENZAPRINE HCL 10 MG
10 TABLET ORAL 3 TIMES DAILY PRN
Qty: 30 TABLET | Refills: 0 | Status: SHIPPED | OUTPATIENT
Start: 2023-07-31 | End: 2023-08-10

## 2023-08-04 ENCOUNTER — TELEPHONE (OUTPATIENT)
Dept: CARDIOLOGY | Facility: CLINIC | Age: 43
End: 2023-08-04
Payer: OTHER GOVERNMENT

## 2023-08-04 ENCOUNTER — OFFICE VISIT (OUTPATIENT)
Dept: ORTHOPEDIC SURGERY | Facility: CLINIC | Age: 43
End: 2023-08-04
Payer: OTHER GOVERNMENT

## 2023-08-04 ENCOUNTER — PREP FOR SURGERY (OUTPATIENT)
Dept: OTHER | Facility: HOSPITAL | Age: 43
End: 2023-08-04
Payer: OTHER GOVERNMENT

## 2023-08-04 VITALS — WEIGHT: 155.4 LBS | BODY MASS INDEX: 28.6 KG/M2 | HEIGHT: 62 IN

## 2023-08-04 DIAGNOSIS — M75.82 ROTATOR CUFF TENDONITIS, LEFT: ICD-10-CM

## 2023-08-04 DIAGNOSIS — M24.012 LOOSE BODY IN SHOULDER JOINT, LEFT: ICD-10-CM

## 2023-08-04 DIAGNOSIS — M19.019 OSTEOARTHRITIS OF AC (ACROMIOCLAVICULAR) JOINT: ICD-10-CM

## 2023-08-04 DIAGNOSIS — M75.52 BURSITIS OF LEFT SHOULDER: ICD-10-CM

## 2023-08-04 DIAGNOSIS — M19.012 PRIMARY OSTEOARTHRITIS OF LEFT SHOULDER: ICD-10-CM

## 2023-08-04 DIAGNOSIS — M75.02 ADHESIVE CAPSULITIS OF LEFT SHOULDER: Primary | ICD-10-CM

## 2023-08-04 DIAGNOSIS — M75.22 BICEPS TENDINITIS OF LEFT UPPER EXTREMITY: ICD-10-CM

## 2023-08-04 DIAGNOSIS — M75.102 TEAR OF LEFT ROTATOR CUFF, UNSPECIFIED TEAR EXTENT, UNSPECIFIED WHETHER TRAUMATIC: Primary | ICD-10-CM

## 2023-08-04 DIAGNOSIS — M75.102 TEAR OF LEFT ROTATOR CUFF, UNSPECIFIED TEAR EXTENT, UNSPECIFIED WHETHER TRAUMATIC: ICD-10-CM

## 2023-08-04 RX ORDER — CLINDAMYCIN PHOSPHATE 900 MG/50ML
900 INJECTION INTRAVENOUS ONCE
OUTPATIENT
Start: 2023-08-04 | End: 2023-08-04

## 2023-08-04 NOTE — TELEPHONE ENCOUNTER
[facility] received a fax that requires your attention. The document has been indexed to the patient's chart for your review.      Reason for sending: EXTERNAL MEDICAL RECORD NOTIFICATION     Documents Description: CARDIAC CLEARANCE REQUEST     Name of Sender: MARNI ENRIQUE     Date Indexed: 8.4.23

## 2023-08-04 NOTE — PROGRESS NOTES
"Chief Complaint  Follow-up of the Left Shoulder     Subjective      Laxmi Jc presents to [facility] for follow up evaluation of the left shoulder pain. The patient recently had an MRI and is here today for those results. To review, She had an injury doing jujitsu in late February. She tried physical therapy and medication without relief. She admits to stiffness of the shoulder, anterior lateral shoulder pain and she also states she has pain in her bicep region with a cramping/locking sensation of the bicep with flexion and extension.     Allergies   Allergen Reactions    Penicillins Other (See Comments)    Sulfa Antibiotics Other (See Comments) and Unknown (See Comments)    Sulfamethoxazole-Trimethoprim Rash        Social History     Socioeconomic History    Marital status:    Tobacco Use    Smoking status: Former     Packs/day: 1.50     Years: 5.00     Pack years: 7.50     Types: Cigarettes     Start date:      Quit date:      Years since quittin.5    Smokeless tobacco: Never   Vaping Use    Vaping Use: Never used   Substance and Sexual Activity    Alcohol use: Never    Drug use: Never    Sexual activity: Defer        I reviewed the patient's chief complaint, history of present illness, review of systems, past medical history, surgical history, family history, social history, medications, and allergy list.     Review of Systems     Constitutional: Denies fevers, chills, weight loss  Cardiovascular: Denies chest pain, shortness of breath  Skin: Denies rashes, acute skin changes  Neurologic: Denies headache, loss of consciousness  MSK: left shoulder pain      Vital Signs:   Ht 157.5 cm (62\")   Wt 70.5 kg (155 lb 6.4 oz)   BMI 28.42 kg/mý          Physical Exam  General: Alert. No acute distress    Ortho Exam      Left shoulder- Active forward elevation 90, passive forward elevation 95, pain with range of motion, abduction 80, external rotation at side 25, " internal rotation to her side, 4+ supraspinatus 5 out of 5 infraspinatus and subscapularis, positive impingement testing, pain with crossarm adduction,.     Procedures      Imaging Results (Most Recent)       None             Result Review :       MRI Humerus Left Without Contrast    Result Date: 7/24/2023  Narrative: PROCEDURE: MRI HUMERUS LEFT WO CONTRAST  COMPARISON: None  INDICATIONS: left arm pain, pain in bicep  TECHNIQUE: A variety of imaging planes and parameters were utilized for visualization of suspected pathology.  Images were performed without contrast. FINDINGS:  A markers been placed along the lateral aspect of the distal arm indicating the site of clinical concern.  No abnormal edema is noted in this region.  No overt mass is seen.  The visualized musculature demonstrates normal signal without evidence of atrophy.  No fracture or malalignment is seen.  No joint effusion or loose body is seen in the elbow joint.      Impression:   1. Negative study.  No acute finding identified.      Glenn Barbour M.D.       Electronically Signed and Approved By: Glenn Barbour M.D. on 7/24/2023 at 15:55                     Assessment and Plan     Diagnoses and all orders for this visit:    1. Adhesive capsulitis of left shoulder (Primary)    2. Tear of left rotator cuff, unspecified tear extent, unspecified whether traumatic    3. Rotator cuff tendonitis, left    4. Biceps tendinitis of left upper extremity    5. Osteoarthritis of AC (acromioclavicular) joint    6. Primary osteoarthritis of left shoulder    7. Bursitis of left shoulder    8. Loose body in shoulder joint, left        Discussed the treatment options with the patient, operative vs non-operative. Discussed the risks and benefits of a left shoulder manipulation,  arthroscopic rotator cuff debridement vs repair, SAD/DCE. The patient expressed understanding and wished to proceed.     Discussed surgery., Risks/benefits discussed with patient including, but  not limited to: infection, bleeding, neurovascular damage, re-rupture, aesthetic deformity, need for further surgery, and death., Discussed with patient the implant type being used during surgery and patient understands., Surgery pamphlet given., Call or return if worsening symptoms., I am requesting authorization for the samr System to reduce the patient's pain and aid in their recovery. I believe the samr Sport System will have positive impact on my patient's quality of life. I would appreciate prompt review of the information and authorization of the samr System.  samr Sport utilizes ultrasonic waves that penetrate 5 cm into the tissue, which increases circulation, oxygen and nutrient delivery, and the removal of waste products, such as lactic acid, from the site of the musculoskeletal injury.  The samr System is a new FDA approved (FDA 510K 543021) treatment modality that that has been shown in recent clinical trials sponsored by the NIH (National Institutes of Health), the DOD (Department of Defense) and [facility] the research arm of the [employer] ([employer]) to reduce patient's pain, increase mobility and speed recovery. , and I am ordering the use of the Nice1 cold therapy machine for 60 days post-op as part of an opioid-sparing approach to help manage pain and edema.  I feel this is medically necessary for the best care for this patient.       Follow Up     2 weeks postoperatively.        Patient was given instructions and counseling regarding her condition or for health maintenance advice. Please see specific information pulled into the AVS if appropriate.     Scribed for Jv Norton MD by Gin Potter.  08/04/23   15:36 EDT    I have personally performed the services described in this document as scribed by the above individual and it is both accurate and complete. Jv Norton MD 08/06/23 I have personally performed the services described in this document  as scribed by the above individual and it is both accurate and complete. Jv Norton MD 08/06/23

## 2023-08-04 NOTE — H&P (VIEW-ONLY)
"Chief Complaint  Follow-up of the Left Shoulder     Subjective      Laxmi Jc presents to [facility] for follow up evaluation of the left shoulder pain. The patient recently had an MRI and is here today for those results. To review, She had an injury doing jujitsu in late February. She tried physical therapy and medication without relief. She admits to stiffness of the shoulder, anterior lateral shoulder pain and she also states she has pain in her bicep region with a cramping/locking sensation of the bicep with flexion and extension.     Allergies   Allergen Reactions    Penicillins Other (See Comments)    Sulfa Antibiotics Other (See Comments) and Unknown (See Comments)    Sulfamethoxazole-Trimethoprim Rash        Social History     Socioeconomic History    Marital status:    Tobacco Use    Smoking status: Former     Packs/day: 1.50     Years: 5.00     Pack years: 7.50     Types: Cigarettes     Start date:      Quit date:      Years since quittin.5    Smokeless tobacco: Never   Vaping Use    Vaping Use: Never used   Substance and Sexual Activity    Alcohol use: Never    Drug use: Never    Sexual activity: Defer        I reviewed the patient's chief complaint, history of present illness, review of systems, past medical history, surgical history, family history, social history, medications, and allergy list.     Review of Systems     Constitutional: Denies fevers, chills, weight loss  Cardiovascular: Denies chest pain, shortness of breath  Skin: Denies rashes, acute skin changes  Neurologic: Denies headache, loss of consciousness  MSK: left shoulder pain      Vital Signs:   Ht 157.5 cm (62\")   Wt 70.5 kg (155 lb 6.4 oz)   BMI 28.42 kg/mý          Physical Exam  General: Alert. No acute distress    Ortho Exam      Left shoulder- Active forward elevation 90, passive forward elevation 95, pain with range of motion, abduction 80, external rotation at side 25, " internal rotation to her side, 4+ supraspinatus 5 out of 5 infraspinatus and subscapularis, positive impingement testing, pain with crossarm adduction,.     Procedures      Imaging Results (Most Recent)       None             Result Review :       MRI Humerus Left Without Contrast    Result Date: 7/24/2023  Narrative: PROCEDURE: MRI HUMERUS LEFT WO CONTRAST  COMPARISON: None  INDICATIONS: left arm pain, pain in bicep  TECHNIQUE: A variety of imaging planes and parameters were utilized for visualization of suspected pathology.  Images were performed without contrast. FINDINGS:  A markers been placed along the lateral aspect of the distal arm indicating the site of clinical concern.  No abnormal edema is noted in this region.  No overt mass is seen.  The visualized musculature demonstrates normal signal without evidence of atrophy.  No fracture or malalignment is seen.  No joint effusion or loose body is seen in the elbow joint.      Impression:   1. Negative study.  No acute finding identified.      Glenn Barbour M.D.       Electronically Signed and Approved By: Glenn Barbour M.D. on 7/24/2023 at 15:55                     Assessment and Plan     Diagnoses and all orders for this visit:    1. Adhesive capsulitis of left shoulder (Primary)    2. Tear of left rotator cuff, unspecified tear extent, unspecified whether traumatic    3. Rotator cuff tendonitis, left    4. Biceps tendinitis of left upper extremity    5. Osteoarthritis of AC (acromioclavicular) joint    6. Primary osteoarthritis of left shoulder    7. Bursitis of left shoulder    8. Loose body in shoulder joint, left        Discussed the treatment options with the patient, operative vs non-operative. Discussed the risks and benefits of a left shoulder manipulation,  arthroscopic rotator cuff debridement vs repair, SAD/DCE. The patient expressed understanding and wished to proceed.     Discussed surgery., Risks/benefits discussed with patient including, but  not limited to: infection, bleeding, neurovascular damage, re-rupture, aesthetic deformity, need for further surgery, and death., Discussed with patient the implant type being used during surgery and patient understands., Surgery pamphlet given., Call or return if worsening symptoms., I am requesting authorization for the samr System to reduce the patient's pain and aid in their recovery. I believe the samr Sport System will have positive impact on my patient's quality of life. I would appreciate prompt review of the information and authorization of the samr System.  samr Sport utilizes ultrasonic waves that penetrate 5 cm into the tissue, which increases circulation, oxygen and nutrient delivery, and the removal of waste products, such as lactic acid, from the site of the musculoskeletal injury.  The samr System is a new FDA approved (FDA 510K 658198) treatment modality that that has been shown in recent clinical trials sponsored by the NIH (National Institutes of Health), the DOD (Department of Defense) and [facility] the research arm of the [employer] ([employer]) to reduce patient's pain, increase mobility and speed recovery. , and I am ordering the use of the Nice1 cold therapy machine for 60 days post-op as part of an opioid-sparing approach to help manage pain and edema.  I feel this is medically necessary for the best care for this patient.       Follow Up     2 weeks postoperatively.        Patient was given instructions and counseling regarding her condition or for health maintenance advice. Please see specific information pulled into the AVS if appropriate.     Scribed for Jv Norton MD by Gin Potter.  08/04/23   15:36 EDT    I have personally performed the services described in this document as scribed by the above individual and it is both accurate and complete. Jv Norton MD 08/06/23 I have personally performed the services described in this document  as scribed by the above individual and it is both accurate and complete. Jv Norton MD 08/06/23

## 2023-08-07 NOTE — TELEPHONE ENCOUNTER
Procedure: Shoulder Manipulation     Medication Directive: NA    PMH: Palpitations, HLD, HTN    Last Seen: 11/29/22

## 2023-08-10 ENCOUNTER — TELEPHONE (OUTPATIENT)
Dept: ORTHOPEDIC SURGERY | Facility: CLINIC | Age: 43
End: 2023-08-10
Payer: OTHER GOVERNMENT

## 2023-08-12 ENCOUNTER — OFFICE VISIT (OUTPATIENT)
Dept: FAMILY MEDICINE CLINIC | Facility: CLINIC | Age: 43
End: 2023-08-12
Payer: OTHER GOVERNMENT

## 2023-08-12 VITALS
DIASTOLIC BLOOD PRESSURE: 64 MMHG | OXYGEN SATURATION: 96 % | HEART RATE: 67 BPM | HEIGHT: 62 IN | SYSTOLIC BLOOD PRESSURE: 110 MMHG | WEIGHT: 146 LBS | BODY MASS INDEX: 26.87 KG/M2 | TEMPERATURE: 97.4 F

## 2023-08-12 DIAGNOSIS — H66.001 ACUTE SUPPURATIVE OTITIS MEDIA OF RIGHT EAR WITHOUT SPONTANEOUS RUPTURE OF TYMPANIC MEMBRANE, RECURRENCE NOT SPECIFIED: Primary | ICD-10-CM

## 2023-08-12 PROCEDURE — 99213 OFFICE O/P EST LOW 20 MIN: CPT | Performed by: NURSE PRACTITIONER

## 2023-08-12 RX ORDER — AZITHROMYCIN 250 MG/1
TABLET, FILM COATED ORAL
Qty: 6 TABLET | Refills: 0 | Status: SHIPPED | OUTPATIENT
Start: 2023-08-12

## 2023-08-12 RX ORDER — OFLOXACIN 3 MG/ML
5 SOLUTION AURICULAR (OTIC) 2 TIMES DAILY
Qty: 5 ML | Refills: 0 | Status: SHIPPED | OUTPATIENT
Start: 2023-08-12

## 2023-08-12 NOTE — PROGRESS NOTES
Chief Complaint  Earache (Right ear is hurting, fluid is coming out and running down her face, that ear sound muffled and this is been going on for 6 months.  She has been to ENT but doesn't feel like they are doing anything to help her. )    Subjective            Laxmi Jc presents to [facility]  History of Present Illness  She reports that her right ear is hurting and that there has been some fluid coming out of it and it would even run down the side of her face and that her hearing is muffled and that this has been an ongoing issue for 6 months and that she has been to an ENT in the past the patient feels as though her symptoms have not improved at all and her last visit with the ENT was actually in December 2022-(Dr. ELLY Sow) -also saw the ENT DR Bower in 2021 and he even did the CT as well and there was no acoustic neuroma --the patient reports that she just feels as though she has a bad ear infection or maybe even a perforated tympanic membrane that no one is treating because she has not been on any type of antibiotic drops or oral antibiotics and at this point she has been to 2 ENTs and she would just like evaluation to see if we think it is an infection or not    PHQ-2 Total Score: 0  PHQ-9 Total Score: 0    Past Medical History:   Diagnosis Date    Anxiety     Clotting disorder     Periods every 10 days    Low back pain        Allergies   Allergen Reactions    Penicillins Other (See Comments)    Sulfa Antibiotics Other (See Comments) and Unknown (See Comments)    Sulfamethoxazole-Trimethoprim Rash        Past Surgical History:   Procedure Laterality Date    BREAST LUMPECTOMY      FAT GRAFTING Left     repair of lumpectomy    KIDNEY STONE SURGERY      OVARIAN CYST REMOVAL          Social History     Tobacco Use    Smoking status: Former     Packs/day: 1.50     Years: 5.00     Pack years: 7.50     Types: Cigarettes     Start date: 2018     Quit date: 2023     Years since  quittin.6    Smokeless tobacco: Never   Vaping Use    Vaping Use: Never used   Substance Use Topics    Alcohol use: Never    Drug use: Never       Family History   Problem Relation Age of Onset    Cancer Mother         Colon    Kidney disease Mother     Hepatitis Mother     Autoimmune disease Mother     Bleeding Disorder Mother     Hyperlipidemia Father     Hypertension Father     Diabetes Father     Stroke Father     Cancer Maternal Grandmother         Stomach        Health Maintenance Due   Topic Date Due    COVID-19 Vaccine (1) Never done    ANNUAL PHYSICAL  Never done    PAP SMEAR  Never done        Current Outpatient Medications on File Prior to Visit   Medication Sig    fexofenadine (ALLEGRA) 60 MG tablet Take 1 tablet by mouth Daily. Takes seasonally    metoprolol succinate XL (TOPROL-XL) 25 MG 24 hr tablet Take 1 tablet by mouth Daily.    naproxen (NAPROSYN) 500 MG tablet Take 1 tablet by mouth 2 (Two) Times a Day for 90 days.    Norditropin FlexPro 5 MG/1.5ML solution pen-injector     oxybutynin XL (DITROPAN-XL) 5 MG 24 hr tablet TAKE 1 TABLET DAILY    topiramate (TOPAMAX) 100 MG tablet Take 1 tablet by mouth 2 (Two) Times a Day.    [DISCONTINUED] Menthol, Topical Analgesic, (Biofreeze Roll-On) 4 % gel Apply 1 application topically 4 (Four) Times a Day As Needed (left shoulder pain).    [DISCONTINUED] methylPREDNISolone (MEDROL) 4 MG dose pack Take as directed on package instructions.     No current facility-administered medications on file prior to visit.         There is no immunization history on file for this patient.    Review of Systems   Constitutional:  Negative for chills and fever.   HENT:  Positive for ear discharge, ear pain and hearing loss. Negative for sinus pressure and sore throat.         At times and then itching and at times sounds like (spider webs in there) and then also reports even at work fluid will drain from this ear as well and she even awakens with fluid that's drained on  "her pillow    Respiratory: Negative.  Negative for cough.    Cardiovascular: Negative.    Gastrointestinal:  Negative for abdominal pain.   Neurological:  Negative for dizziness, light-headedness and headache.      Objective     /64 (BP Location: Left arm, Patient Position: Sitting, Cuff Size: Adult)   Pulse 67   Temp 97.4 øF (36.3 øC) (Temporal)   Ht 157.5 cm (62\")   Wt 66.2 kg (146 lb)   SpO2 96%   BMI 26.70 kg/mý       Physical Exam  Vitals and nursing note reviewed.   Constitutional:       Appearance: Normal appearance.   HENT:      Head: Normocephalic.      Right Ear: External ear normal. A middle ear effusion is present. Tympanic membrane is injected and erythematous. Tympanic membrane is not perforated.      Left Ear: Tympanic membrane, ear canal and external ear normal.      Nose: Nose normal.      Mouth/Throat:      Mouth: Mucous membranes are moist.   Eyes:      Pupils: Pupils are equal, round, and reactive to light.   Cardiovascular:      Rate and Rhythm: Normal rate and regular rhythm.      Heart sounds: Normal heart sounds.   Pulmonary:      Effort: Pulmonary effort is normal.      Breath sounds: Normal breath sounds.   Abdominal:      Palpations: Abdomen is soft.   Musculoskeletal:         General: Normal range of motion.      Cervical back: Normal range of motion.   Skin:     General: Skin is warm and dry.   Neurological:      Mental Status: She is alert and oriented to person, place, and time.   Psychiatric:         Mood and Affect: Mood normal.         Behavior: Behavior normal.         Thought Content: Thought content normal.         Judgment: Judgment normal.       Result Review :                CT IAC Without Contrast (06/09/2021 18:41)   CT mt iac wo contrast with Florencio Bower MD (06/09/2021)       Office Visit with Edward Sow MD (12/21/2022)      Assessment and Plan      Diagnoses and all orders for this visit:    1. Acute suppurative otitis media of right ear " without spontaneous rupture of tympanic membrane, recurrence not specified (Primary)  Comments:  suffered with this x 6 months --waxes and wanes  Orders:  -     ofloxacin (Floxin Otic) 0.3 % otic solution; Administer 5 drops to the right ear 2 (Two) Times a Day.  Dispense: 5 mL; Refill: 0  -     azithromycin (Zithromax Z-Sonny) 250 MG tablet; Take 2 tablets by mouth on day 1, then 1 tablet daily on days 2-5  Dispense: 6 tablet; Refill: 0            Follow Up     Return if symptoms worsen or fail to improve.    Patient was given instructions and counseling regarding her condition or for health maintenance advice. Please see specific information pulled into the AVS if appropriate.         Laxmi Jc  reports that she quit smoking about 7 months ago. Her smoking use included cigarettes. She started smoking about 5 years ago. She has a 7.50 pack-year smoking history. She has never used smokeless tobacco.

## 2023-08-17 RX ORDER — CYCLOBENZAPRINE HCL 10 MG
10 TABLET ORAL NIGHTLY
COMMUNITY

## 2023-08-17 NOTE — PRE-PROCEDURE INSTRUCTIONS
IMPORTANT INSTRUCTIONS - PRE-ADMISSION TESTING  DO NOT EAT OR CHEW anything after midnight the night before your procedure.    You may have CLEAR liquids up to __2____ hours prior to ARRIVAL time.   Take the following medications the morning of your procedure with JUST A SIP OF WATER:  ___TOPIRAMATE,___________________________________________________________________________________________________________________________________________________________________________________    DO NOT BRING your medications to the hospital with you, UNLESS something has changed since your PRE-Admission Testing appointment.  Hold all vitamins, supplements, and NSAIDS (Non- steroidal anti-inflammatory meds) for one week prior to surgery (you MAY take Tylenol or Acetaminophen).  If you are diabetic, check your blood sugar the morning of your procedure. If it is less than 70 or if you are feeling symptomatic, call the following number for further instructions: [phone]  Use your inhalers/nebulizers as usual, the morning of your procedure. BRING YOUR INHALERS with you.   Bring your CPAP or BIPAP to hospital, ONLY IF YOU WILL BE SPENDING THE NIGHT.   Make sure you have a ride home and have someone who will stay with you the day of your procedure after you go home.  If you have any questions, please call your Pre-Admission Testing Nurse, _____KALPANA___________ at [phone]__.   Per anesthesia request, do not smoke for 24 hours before your procedure or as instructed by your surgeon.    BATHING INSTRUCTIONS GIVEN. NO JEWELRY DAY OF PROCEDURE. NO NAIL POLISH UPPER OR LOWER EXTREMITIES.  [facility] MAIN DO[facility]  CASH, CHECK, OR CARD AT DISCHARGE IF INDICATED

## 2023-08-21 ENCOUNTER — HOSPITAL ENCOUNTER (OUTPATIENT)
Facility: HOSPITAL | Age: 43
Setting detail: HOSPITAL OUTPATIENT SURGERY
Discharge: HOME | End: 2023-08-21
Attending: ORTHOPAEDIC SURGERY | Admitting: ORTHOPAEDIC SURGERY
Payer: OTHER GOVERNMENT

## 2023-08-21 ENCOUNTER — ANESTHESIA EVENT (OUTPATIENT)
Dept: PERIOP | Facility: HOSPITAL | Age: 43
End: 2023-08-21
Payer: OTHER GOVERNMENT

## 2023-08-21 ENCOUNTER — ANESTHESIA EVENT CONVERTED (OUTPATIENT)
Dept: ANESTHESIOLOGY | Facility: HOSPITAL | Age: 43
End: 2023-08-21
Payer: OTHER GOVERNMENT

## 2023-08-21 ENCOUNTER — ANESTHESIA (OUTPATIENT)
Dept: PERIOP | Facility: HOSPITAL | Age: 43
End: 2023-08-21
Payer: OTHER GOVERNMENT

## 2023-08-21 VITALS
WEIGHT: 144.84 LBS | TEMPERATURE: 97.1 F | SYSTOLIC BLOOD PRESSURE: 100 MMHG | OXYGEN SATURATION: 100 % | RESPIRATION RATE: 16 BRPM | DIASTOLIC BLOOD PRESSURE: 65 MMHG | HEART RATE: 76 BPM | BODY MASS INDEX: 26.65 KG/M2 | HEIGHT: 62 IN

## 2023-08-21 DIAGNOSIS — M75.102 TEAR OF LEFT ROTATOR CUFF, UNSPECIFIED TEAR EXTENT, UNSPECIFIED WHETHER TRAUMATIC: ICD-10-CM

## 2023-08-21 LAB — B-HCG UR QL: NEGATIVE

## 2023-08-21 PROCEDURE — 81025 URINE PREGNANCY TEST: CPT | Performed by: ANESTHESIOLOGY

## 2023-08-21 PROCEDURE — C1713 ANCHOR/SCREW BN/BN,TIS/BN: HCPCS | Performed by: ORTHOPAEDIC SURGERY

## 2023-08-21 PROCEDURE — 25010000002 HYDROMORPHONE 1 MG/ML SOLUTION

## 2023-08-21 PROCEDURE — 25010000002 SUGAMMADEX 200 MG/2ML SOLUTION

## 2023-08-21 PROCEDURE — 25010000002 FENTANYL CITRATE (PF) 50 MCG/ML SOLUTION

## 2023-08-21 PROCEDURE — 25010000002 DEXAMETHASONE PER 1 MG

## 2023-08-21 PROCEDURE — 25010000002 PROPOFOL 10 MG/ML EMULSION

## 2023-08-21 PROCEDURE — 0 CLINDAMYCIN PER 300 MG: Performed by: ORTHOPAEDIC SURGERY

## 2023-08-21 PROCEDURE — 23430 REPAIR BICEPS TENDON: CPT | Performed by: ORTHOPAEDIC SURGERY

## 2023-08-21 PROCEDURE — 25010000002 ONDANSETRON PER 1 MG

## 2023-08-21 PROCEDURE — 29822 SHO ARTHRS SRG LMTD DBRDMT: CPT | Performed by: ORTHOPAEDIC SURGERY

## 2023-08-21 PROCEDURE — 25010000002 MIDAZOLAM PER 1MG: Performed by: ANESTHESIOLOGY

## 2023-08-21 DEVICE — SYS IMP TENODESIS PROX: Type: IMPLANTABLE DEVICE | Site: SHOULDER | Status: FUNCTIONAL

## 2023-08-21 RX ORDER — CLINDAMYCIN PHOSPHATE 900 MG/50ML
900 INJECTION, SOLUTION INTRAVENOUS ONCE
Status: COMPLETED | OUTPATIENT
Start: 2023-08-21 | End: 2023-08-21

## 2023-08-21 RX ORDER — OXYCODONE HYDROCHLORIDE 5 MG/1
5 TABLET ORAL EVERY 4 HOURS PRN
Status: DISCONTINUED | OUTPATIENT
Start: 2023-08-21 | End: 2023-08-21 | Stop reason: HOSPADM

## 2023-08-21 RX ORDER — FENTANYL CITRATE 50 UG/ML
INJECTION, SOLUTION INTRAMUSCULAR; INTRAVENOUS AS NEEDED
Status: DISCONTINUED | OUTPATIENT
Start: 2023-08-21 | End: 2023-08-21 | Stop reason: SURG

## 2023-08-21 RX ORDER — PROMETHAZINE HYDROCHLORIDE 25 MG/1
25 SUPPOSITORY RECTAL ONCE AS NEEDED
Status: DISCONTINUED | OUTPATIENT
Start: 2023-08-21 | End: 2023-08-21 | Stop reason: HOSPADM

## 2023-08-21 RX ORDER — LIDOCAINE HYDROCHLORIDE 20 MG/ML
INJECTION, SOLUTION EPIDURAL; INFILTRATION; INTRACAUDAL; PERINEURAL AS NEEDED
Status: DISCONTINUED | OUTPATIENT
Start: 2023-08-21 | End: 2023-08-21 | Stop reason: SURG

## 2023-08-21 RX ORDER — ROCURONIUM BROMIDE 10 MG/ML
INJECTION, SOLUTION INTRAVENOUS AS NEEDED
Status: DISCONTINUED | OUTPATIENT
Start: 2023-08-21 | End: 2023-08-21 | Stop reason: SURG

## 2023-08-21 RX ORDER — MEPERIDINE HYDROCHLORIDE 25 MG/ML
12.5 INJECTION INTRAMUSCULAR; INTRAVENOUS; SUBCUTANEOUS
Status: DISCONTINUED | OUTPATIENT
Start: 2023-08-21 | End: 2023-08-21 | Stop reason: HOSPADM

## 2023-08-21 RX ORDER — ONDANSETRON 2 MG/ML
INJECTION INTRAMUSCULAR; INTRAVENOUS AS NEEDED
Status: DISCONTINUED | OUTPATIENT
Start: 2023-08-21 | End: 2023-08-21 | Stop reason: SURG

## 2023-08-21 RX ORDER — IBUPROFEN 800 MG/1
800 TABLET ORAL EVERY 8 HOURS PRN
Qty: 60 TABLET | Refills: 0 | Status: SHIPPED | OUTPATIENT
Start: 2023-08-21

## 2023-08-21 RX ORDER — NALOXONE HYDROCHLORIDE 4 MG/.1ML
SPRAY NASAL
Qty: 2 EACH | Refills: 0 | Status: SHIPPED | OUTPATIENT
Start: 2023-08-21

## 2023-08-21 RX ORDER — BUPIVACAINE HYDROCHLORIDE AND EPINEPHRINE 5; 5 MG/ML; UG/ML
INJECTION, SOLUTION EPIDURAL; INTRACAUDAL; PERINEURAL
Status: COMPLETED | OUTPATIENT
Start: 2023-08-21 | End: 2023-08-21

## 2023-08-21 RX ORDER — ACETAMINOPHEN 500 MG
1000 TABLET ORAL ONCE
Status: COMPLETED | OUTPATIENT
Start: 2023-08-21 | End: 2023-08-21

## 2023-08-21 RX ORDER — PROPOFOL 10 MG/ML
VIAL (ML) INTRAVENOUS AS NEEDED
Status: DISCONTINUED | OUTPATIENT
Start: 2023-08-21 | End: 2023-08-21 | Stop reason: SURG

## 2023-08-21 RX ORDER — SODIUM CHLORIDE, SODIUM LACTATE, POTASSIUM CHLORIDE, CALCIUM CHLORIDE 600; 310; 30; 20 MG/100ML; MG/100ML; MG/100ML; MG/100ML
9 INJECTION, SOLUTION INTRAVENOUS CONTINUOUS PRN
Status: DISCONTINUED | OUTPATIENT
Start: 2023-08-21 | End: 2023-08-21 | Stop reason: HOSPADM

## 2023-08-21 RX ORDER — ONDANSETRON 2 MG/ML
4 INJECTION INTRAMUSCULAR; INTRAVENOUS ONCE AS NEEDED
Status: DISCONTINUED | OUTPATIENT
Start: 2023-08-21 | End: 2023-08-21 | Stop reason: HOSPADM

## 2023-08-21 RX ORDER — DEXAMETHASONE SODIUM PHOSPHATE 4 MG/ML
INJECTION, SOLUTION INTRA-ARTICULAR; INTRALESIONAL; INTRAMUSCULAR; INTRAVENOUS; SOFT TISSUE AS NEEDED
Status: DISCONTINUED | OUTPATIENT
Start: 2023-08-21 | End: 2023-08-21 | Stop reason: SURG

## 2023-08-21 RX ORDER — PROMETHAZINE HYDROCHLORIDE 12.5 MG/1
25 TABLET ORAL ONCE AS NEEDED
Status: DISCONTINUED | OUTPATIENT
Start: 2023-08-21 | End: 2023-08-21 | Stop reason: HOSPADM

## 2023-08-21 RX ORDER — MIDAZOLAM HYDROCHLORIDE 2 MG/2ML
4 INJECTION, SOLUTION INTRAMUSCULAR; INTRAVENOUS ONCE
Status: COMPLETED | OUTPATIENT
Start: 2023-08-21 | End: 2023-08-21

## 2023-08-21 RX ORDER — PHENYLEPHRINE HCL IN 0.9% NACL 1 MG/10 ML
SYRINGE (ML) INTRAVENOUS AS NEEDED
Status: DISCONTINUED | OUTPATIENT
Start: 2023-08-21 | End: 2023-08-21 | Stop reason: SURG

## 2023-08-21 RX ORDER — OXYCODONE AND ACETAMINOPHEN 7.5; 325 MG/1; MG/1
1-2 TABLET ORAL EVERY 4 HOURS PRN
Qty: 36 TABLET | Refills: 0 | Status: SHIPPED | OUTPATIENT
Start: 2023-08-21

## 2023-08-21 RX ADMIN — ACETAMINOPHEN 1000 MG: 500 TABLET ORAL at 10:03

## 2023-08-21 RX ADMIN — ONDANSETRON 4 MG: 2 INJECTION INTRAMUSCULAR; INTRAVENOUS at 14:21

## 2023-08-21 RX ADMIN — BUPIVACAINE HYDROCHLORIDE AND EPINEPHRINE BITARTRATE 32 ML: 5; .005 INJECTION, SOLUTION EPIDURAL; INTRACAUDAL; PERINEURAL at 10:54

## 2023-08-21 RX ADMIN — SODIUM CHLORIDE, POTASSIUM CHLORIDE, SODIUM LACTATE AND CALCIUM CHLORIDE 9 ML/HR: 600; 310; 30; 20 INJECTION, SOLUTION INTRAVENOUS at 10:03

## 2023-08-21 RX ADMIN — FENTANYL CITRATE 25 MCG: 50 INJECTION, SOLUTION INTRAMUSCULAR; INTRAVENOUS at 13:31

## 2023-08-21 RX ADMIN — DEXAMETHASONE SODIUM PHOSPHATE 4 MG: 4 INJECTION, SOLUTION INTRAMUSCULAR; INTRAVENOUS at 13:41

## 2023-08-21 RX ADMIN — HYDROMORPHONE HYDROCHLORIDE 0.5 MG: 1 INJECTION, SOLUTION INTRAMUSCULAR; INTRAVENOUS; SUBCUTANEOUS at 15:33

## 2023-08-21 RX ADMIN — PROPOFOL 150 MG: 10 INJECTION, EMULSION INTRAVENOUS at 13:31

## 2023-08-21 RX ADMIN — LIDOCAINE HYDROCHLORIDE 80 MG: 20 INJECTION, SOLUTION EPIDURAL; INFILTRATION; INTRACAUDAL; PERINEURAL at 13:31

## 2023-08-21 RX ADMIN — ROCURONIUM BROMIDE 50 MG: 50 INJECTION INTRAVENOUS at 13:31

## 2023-08-21 RX ADMIN — SUGAMMADEX 150 MG: 100 INJECTION, SOLUTION INTRAVENOUS at 14:46

## 2023-08-21 RX ADMIN — FENTANYL CITRATE 25 MCG: 50 INJECTION, SOLUTION INTRAMUSCULAR; INTRAVENOUS at 14:00

## 2023-08-21 RX ADMIN — Medication 100 MCG: at 13:29

## 2023-08-21 RX ADMIN — MIDAZOLAM HYDROCHLORIDE 4 MG: 1 INJECTION, SOLUTION INTRAMUSCULAR; INTRAVENOUS at 10:54

## 2023-08-21 RX ADMIN — CLINDAMYCIN PHOSPHATE 900 MG: 900 INJECTION, SOLUTION INTRAVENOUS at 13:33

## 2023-08-21 NOTE — ANESTHESIA PREPROCEDURE EVALUATION
Anesthesia Evaluation     Patient summary reviewed and Nursing notes reviewed   no history of anesthetic complications:   NPO Solid Status: > 8 hours  NPO Liquid Status: > 2 hours           Airway   Mallampati: II  TM distance: >3 FB  Neck ROM: full  No difficulty expected  Dental      Pulmonary - negative pulmonary ROS and normal exam    breath sounds clear to auscultation  Cardiovascular - negative cardio ROS and normal exam  Exercise tolerance: good (4-7 METS)    Rhythm: regular  Rate: normal        Neuro/Psych  (+) headaches, psychiatric history  GI/Hepatic/Renal/Endo    (+) GERD    Musculoskeletal     Abdominal    Substance History - negative use     OB/GYN negative ob/gyn ROS         Other   arthritis,     ROS/Med Hx Other: HX OF PALPITATIONS, PITUITARY ADENOMA. METS>4. NO CP/SOA. DR. GUADARRAMA CLEARED. LAST VISIT 11/29/22 W/ F/U IN 1 YR. ECHO 12/9/20 EF 66%.FOLLOWED BY DR. ESTEBAN FOR NEURO LAST TELEHELATH VISIT 5/12/23. ELM Chart reviewed w/Kelsey. May proceed. LMA                 Anesthesia Plan    ASA 2     general with block     (Patient understands anesthesia not responsible for dental damage.)  intravenous induction     Anesthetic plan, risks, benefits, and alternatives have been provided, discussed and informed consent has been obtained with: patient.    Use of blood products discussed with patient .    Plan discussed with CRNA.    CODE STATUS:

## 2023-08-21 NOTE — BRIEF OP NOTE
SHOULDER ARTHROSCOPY WITH ROTATOR CUFF REPAIR  Progress Note    Laxmi Jc  8/21/2023    Pre-op Diagnosis:   Tear of left rotator cuff, unspecified tear extent, unspecified whether traumatic [M75.102]       Post-Op Diagnosis Codes:     * Tear of left rotator cuff, unspecified tear extent, unspecified whether traumatic [M75.102]  Left shoulder labral tear  Left shoulder adhesive capsulitis    Procedure/CPTr Codes:        Procedure(s):  LEFT SHOULDER MANIPULATION ARTHROSCOPIC ROTATOR CUFF DEBRIDEMENT AND SUBACROMIAL DECOMPRESSION labral debridement, manipulation, subpectoral biceps tenodesis        Surgeon(s):  Jv Norton MD    Anesthesia: General    Staff:   Circulator: Shira Pemberton RN  Scrub Person: Imani Sanchez  Vendor Representative: Riddhi Restrepo Matt  Assistant: Domingo Hemphill RN        Estimated Blood Loss:  10cc    Urine Voided: * No values recorded between 8/21/2023  1:25 PM and 8/21/2023  2:45 PM *    Specimens:                None          Drains: * No LDAs found *    Findings: Adhesive capsulitis, labral tear, partial rotator cuff tear        Complications: None    Assistant: Domingo Hemphill RN  was responsible for performing the following activities: Retraction, Suction, Irrigation, and Placing Dressing and their skilled assistance was necessary for the success of this case.    Jv Norton MD     Date: 8/21/2023  Time: 14:45 EDT

## 2023-08-21 NOTE — DISCHARGE INSTRUCTIONS
DISCHARGE INSTRUCTIONS  Post-Operative  Arthroscopic Shoulder Surgery      For your surgery you had:  General anesthesia (you may have a sore throat for the first 24 hours)  You received an anesthesia medication today that can cause hormonal forms of birth control to be ineffective. You should use a different form of birth control (to prevent pregnancy) for 7 days.   IV sedation.  Local anesthesia  Monitored anesthesia care  You may experience dizziness, drowsiness, or light-headedness for several hours following surgery/procedure.  Do not stay alone today or tonight.  Limit your activity for 24 hours.  Resume your diet slowly.  Follow whatever special dietary instructions you may have been given by your doctor.  You should not drive or operate machinery or drink alcohol for 24 hours or while you are taking pain medication.  You should not sign legally binding documents.  Post-Operative Day #1 is counted as the 1st day after surgery.  [x] If you had a regional block, you will not have control of your arm for up to 12 hours.  Protect the arm with a sling or follow your physician's specific instructions.  Post-Operative Day #2 Wednesday 8/23/23  Remove your dressing.  Under the dressing you will either have sutures or staples.  Change dressing once or twice daily.  Place a dry dressing or band-aids over the small incisions.  Prior to dressing change, wash your hands.  Post-Operative Day #2 Wednesday 8/23/23  You may shower.  During the shower, you may let the water hit the incision and roll down but do not scrub or place any soap on the incision.  Do not soak it.  Pat it dry and do not put any ointments on the incision unless directed by surgeon. Then replace the dry dressing.    General Instructions  [x] Use ice pack to shoulder consistently.  This can help with reducing swelling and pain relief.  Ice packs stay cold for 4 hours- then switch out with new ice packs from freezer. Avoid getting dressing wet.  Keep arm  elevated with sling to decrease swelling and minimize throbbing pain.  The sling will provide support for your shoulder.  It is important to remove the sling 3-5 times daily to work on range-of-motion of the elbow, wrist and hand.  You will receive a prescription for physical therapy, unless otherwise instructed by physician.  After most shoulder surgeries, it is permissible to start exercises by slowing trying to crawl your fingers up a wall until your arm is parallel to the floor.  While doing this support the affected arm at the elbow or closer to the shoulder.  You may also lean over and let the arm and hand do small or large circles or write the alphabet with your hanging arm to keep it loose.  It is normal to have some pain with these gentle exercises.  The exercises help reduce swelling and pain overall and help to avoid a stiff shoulder post-operatively.  It is okay to come out of the sling for showering or for certain activities of daily living but avoid any lifting or carrying with the affected arm until instructed by the physician especially if you have had a repair of the rotator cuff or some type of reconstructive procedure.  It is okay to come out of the sling and support the arm with a pillow if you remain sedentary.  In general, sling use is preferred following a repair or reconstruction for 4 weeks.  If you have had a SAD (sub acromial decompression), continue sling use more liberally because there was not a repair or reconstruction that could be harmed.          DISCHARGE INSTRUCTIONS  Post-Operative   Arthroscopic Shoulder Surgery      Exercise fingers for 10 minutes every hour while awake.  The physician will typically inform the family and the patient whether or not a repair or reconstruction could be harmed.  If you have had a rotator cuff repair or reconstructive procedure, do not let the arm drop down suddenly from an elevated position down to your side despite improvements made in pain and  over the 3 months following repair and reconstruction.  It generally takes 8-12 weeks before the repair or reconstruction does not rely on sutures and anchors that are placed for the repair.  You are generally given a prescription for a narcotic medication.  Take as prescribed.  You may use over-the-counter anti-inflammatory medications such as Motrin or Aleve for additional pain or fever reduction if not allergic.  If you are taking the pain medication prescribed, do not take Tylenol too unless you consult with the pharmacist. The pain medications generally have Tylenol in them and too much Tylenol can be harmful.  Sometimes it is recommended to take an anti-inflammatory in between doses of prescription pain medication if additional pain relief is needed.  Consult with your physician or your pharmacist before taking over-the-counter pain medications/anti-inflammatories.  It is not uncommon to have a fever post-operatively especially in the first 24-48 hours.  Anti-inflammatories can be used for fever reduction also.  It is normal to have some redness or irritation around skin sutures or staples, however, if you have any expanding areas of redness with a persistent fever and increasing pain notify the physician as soon as possible.  The incidence of blood clots is low following arthroscopic procedures, however, if you notice any increasing pain or swelling in your calves or legs, contact the physician as soon as possible.   It is difficult to sleep in the customary fashion following a shoulder surgery.  It is usually necessary to sleep with the shoulder above the level of the heart such as in a recliner, couch or with the head of the bed elevated.  This should slowly improve over the weeks following shoulder surgery.  If unable to urinate 6 to 8 hours after surgery or urinating frequently in small amounts, notify the physician or go to the nearest Emergency Room.  SPECIAL INSTRUCTIONS:        NOTIFY YOUR PHYSICIAN  IF YOU EXPERIENCE:  Numbness of fingers.  Inability to move fingers.  Extreme coldness, paleness or blue dis-coloration of fingers.  Any pain other than from the incision, such as swelling of the arm that blocks circulation of fingers.  Follow verbal instructions from your doctor.  Medications per physician instructions as indicated on Discharge Medication Information Sheet.  You should see  for follow-up care  on  Tuesday Sep 5, 2023 11:00 AM   Phone number: _____[phone]__________________________  Missing your appointment/follow-up could lead to serious complications  If you have an emergency and cannot reach your doctor, go to an Emergency Room nearest your home.

## 2023-08-21 NOTE — ANESTHESIA POSTPROCEDURE EVALUATION
Patient: Laxmi Jc    Procedure Summary       Date: 08/21/23 Room / Location: [facility] OR [address] [place] [address] OR [facility]    Anesthesia Start: 1323 Anesthesia Stop: 1500    Procedure: LEFT SHOULDER MANIPULATION,  ARTHROSCOPY ROTATOR CUFF DEBRIDEMENT AND SUBACROMIAL DECOMPRESSION , labral debridement, subpectoral biceps tenodesis (Left: Shoulder) Diagnosis:       Tear of left rotator cuff, unspecified tear extent, unspecified whether traumatic      (Tear of left rotator cuff, unspecified tear extent, unspecified whether traumatic [M75.102])    Surgeons: Jv Norton MD Provider: Scott Dunaway MD    Anesthesia Type: general with block ASA Status: 2            Anesthesia Type: general with block    Vitals  Vitals Value Taken Time   /65 08/21/23 1542   Temp 36.2 øC (97.1 øF) 08/21/23 1545   Pulse 76 08/21/23 1545   Resp 16 08/21/23 1545   SpO2 100 % 08/21/23 1545           Post Anesthesia Care and Evaluation    Patient location during evaluation: bedside  Patient participation: complete - patient participated  Level of consciousness: awake  Pain score: 0  Pain management: adequate    Airway patency: patent  PONV Status: none  Cardiovascular status: acceptable and stable  Respiratory status: acceptable  Hydration status: acceptable    Comments: An Anesthesiologist personally participated in the most demanding procedures (including induction and emergence if applicable) in the anesthesia plan, monitored the course of anesthesia administration at frequent intervals and remained physically present and available for immediate diagnosis and treatment of emergencies.

## 2023-08-21 NOTE — ANESTHESIA PROCEDURE NOTES
Peripheral Block    Pre-sedation assessment completed: 8/21/2023 9:43 AM    Patient reassessed immediately prior to procedure    Patient location during procedure: pre-op  Start time: 8/21/2023 10:54 AM  Stop time: 8/21/2023 11:02 AM  Reason for block: at surgeon's request and post-op pain management  Performed by  Anesthesiologist: Scott Dunaway MD  Preanesthetic Checklist  Completed: patient identified, IV checked, site marked, risks and benefits discussed, surgical consent, monitors and equipment checked, pre-op evaluation and timeout performed  Prep:  Pt Position: supine (HOB elevated)  Sterile barriers:cap, washed/disinfected hands, sterile barriers, gloves, mask, partial drape and alcohol skin prep  Prep: ChloraPrep  Patient monitoring: blood pressure monitoring, continuous pulse oximetry and EKG  Procedure    Sedation: yes  Performed under: local infiltration  Guidance:ultrasound guided and nerve stimulator    ULTRASOUND INTERPRETATION.  Using ultrasound guidance a 22 G gauge needle was placed in close proximity to the brachial plexus nerve, at which point, under ultrasound guidance anesthetic was injected in the area of the nerve and spread of the anesthesia was seen on ultrasound in close proximity thereto.  There were no abnormalities seen on ultrasound; a digital image was taken; and the patient tolerated the procedure with no complications. Images:still images obtained, printed/placed on chart    Laterality:left  Block Type:interscalene  Injection Technique:single-shot  Needle Type:echogenic  Needle Gauge:22 G (2in)  Resistance on Injection: none    Medications Used: bupivacaine-EPINEPHrine PF (MARCAINE w/EPI) 0.5% -1:[id number] injection - Injection, Interscalene   32 mL - 8/21/2023 10:54:00 AM      Medications  Comment:8 cc of solution injected into musculoskeletal nerve bundle    50mcg precedex and decadron 4mg added to solution mixture    Post Assessment  Injection Assessment: negative aspiration for  heme, no paresthesia on injection and incremental injection  Patient Tolerance:comfortable throughout block  Complications:no  Additional Notes  The block or continuous infusion is requested by the referring physician for management of postoperative pain, or pain related to a procedure. Ultrasound guidance (deemed medically necessary). Painless injection, pt was awake and conversant during the procedure without complications. Needle and surrounding structures visualized throughout procedure. No adverse reactions or complications seen during this period. Post-procedure image showed no signs of complication, and anatomy was consistent with an uncomplicated nerve blockade.

## 2023-08-22 ENCOUNTER — TREATMENT (OUTPATIENT)
Dept: PHYSICAL THERAPY | Facility: CLINIC | Age: 43
End: 2023-08-22
Payer: OTHER GOVERNMENT

## 2023-08-22 DIAGNOSIS — Z98.890 S/P SHOULDER SURGERY: Primary | ICD-10-CM

## 2023-08-22 DIAGNOSIS — M25.512 ACUTE PAIN OF LEFT SHOULDER: ICD-10-CM

## 2023-08-22 DIAGNOSIS — M75.102 TEAR OF LEFT ROTATOR CUFF, UNSPECIFIED TEAR EXTENT, UNSPECIFIED WHETHER TRAUMATIC: Primary | ICD-10-CM

## 2023-08-22 DIAGNOSIS — Z98.890 STATUS POST DECOMPRESSION OF SUBACROMIAL SPACE: ICD-10-CM

## 2023-08-22 DIAGNOSIS — M25.612 STIFFNESS OF LEFT SHOULDER JOINT: ICD-10-CM

## 2023-08-22 DIAGNOSIS — M75.82 ROTATOR CUFF TENDONITIS, LEFT: ICD-10-CM

## 2023-08-22 DIAGNOSIS — R29.898 WEAKNESS OF LEFT UPPER EXTREMITY: ICD-10-CM

## 2023-08-22 NOTE — PROGRESS NOTES
Occupational Therapy Initial Evaluation and Plan of Care  [place]  OT: [address]  [place] [id number]    Patient: Laxmi Jc   : 1980  Diagnosis/ICD-10 Code:  S/P shoulder surgery [Z98.890]  Referring practitioner: Jv Norton MD  Date of Initial Visit: 2023  Today's Date: 2023  Patient seen for 1 sessions           Subjective Questionnaire: QuickDASH: 41/55      Subjective Evaluation    History of Present Illness  Date of surgery: 2023  Mechanism of injury: Pt is a RHD 43 y/o female who presents to therapy s/p L shoulder manipulation, biceps tenodesis, SAD, RTC debridement, and labral debridement. Pt reports 2023 she was in a [employer] class when she landed on her shoulder. MRI revealed:   1. Rotator cuff tendinosis is present with mild partial articular sided tearing of the   infraspinatus tendon as well as the superior fibers of the supraspinatus tendon.  No sizable or   complete tear identified.  2. Biceps tenosynovitis.  3. Mild acromioclavicular and glenohumeral osteoarthritis.  4. Trace joint effusion with mild subcoracoid bursitis with a loose body present.  She attempted conservative treatment but was unsuccessful. Pt completed PT from about April until about the beginning of August. 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  PMHx: OA    Pain  Current pain ratin  At best pain ratin (before surgery)  At worst pain ratin (before surgery)  Location: posterior L shoulder  Quality: burning  Alleviating factors: none.  Aggravating factors: movement    Social Support  Lives with: spouse and young children    Hand dominance: right    Diagnostic Tests  MRI studies: abnormal    Treatments  Previous treatment: physical therapy and injection treatment  Patient Goals  Patient goals for therapy: decreased edema, decreased pain, increased motion, increased strength, independence with ADLs/IADLs, return to sport/leisure activities and return to  "work  Patient goal: \"Ideally I would like my arm to be back to normal. Full range of motion. I'd like to be able to lift my arm above my head. I'd like to be able to put things in my back pocket. To play with my kids\"         Objective          Observations     Additional Shoulder Observation Details  Pt presents in sling to L arm. Bandages covering shoulder.    Neurological Testing     Additional Neurological Details  Pt reports L arm is numb from nerve block.    Active Range of Motion     Additional Active Range of Motion Details  Deferred.  Pt unable to actively move L UE secondary to nerve block.    Passive Range of Motion     Right Shoulder   Flexion: 125 degrees   Abduction: 140 degrees     Additional Passive Range of Motion Details  ER N/A secondary to biceps tenodesis and pt having no feeling of L UE.    Strength/Myotome Testing     Additional Strength Details  Deferred at this time.        Assessment & Plan       Assessment  Impairments: abnormal or restricted ROM, activity intolerance, impaired physical strength, lacks appropriate home exercise program and pain with function   Functional limitations: carrying objects, lifting, sleeping, pulling, pushing, uncomfortable because of pain, moving in bed, reaching behind back and reaching overhead   Assessment details: Pt will benefit from skilled OT secondary to decreased strength/ROM and increased pain that limits pt ability to complete ADLs.  Prognosis: good    Goals  Plan Goals: SHOULDER  PROBLEMS:     1. The patient has limited ROM of the L shoulder.    LTG 1: 12 weeks:  The patient will demonstrate 140 degrees of active shoulder flexion, 140 degrees of shoulder abduction, external rotation to behind head, and internal rotation to lumbar spine to allow the patient to reach into upper kitchen cabinets and manipulate clothing behind the back with greater ease.    STATUS:  New   STG 1a: 8 weeks:  The patient will demonstrate 140 degrees of passive shoulder " flexion, 140 degrees of passive shoulder abduction, 40 degrees of passive shoulder external rotation, and 40 degrees of passive shoulder internal rotation.    STATUS:  New   TREATMENT: Manual therapy, therapeutic exercise, home exercise instruction, and modalities as needed to include: electrical stimulation, ultrasound, moist heat, and ice.    2. The patient has limited strength of the L shoulder.   LTG 2: 12 weeks:  The patient will demonstrate 5 /5 strength for L shoulder flexion, abduction, external rotation, and internal rotation in order to demonstrate improved shoulder stability.    STATUS:  New   STG 2a: 8 weeks:  The patient will demonstrate ability to tolerate MMT for L shoulder flexion, abduction, external rotation, and internal rotation.    STATUS:  New   STG2b:  8 weeks:  The patient will be independent with home exercises.     STATUS:  New   TREATMENT: Manual therapy, therapeutic exercise, home exercise instruction, and modalities as needed to include: electrical stimulation, ultrasound, moist heat, and ice.     3. The patient complains of pain to the L shoulder.   LTG 3: 12 weeks:  The patient will report a pain rating of 1 /10 or better in order to improve sleep quality and tolerance to performance of activities of daily living.    STATUS:  New   STG 3a: 8 weeks:  The patient will report a pain rating of 3 /10 or better.     STATUS:  New   TREATMENT: Manual therapy, therapeutic exercise, home exercise instruction, and modalities as needed to include: electrical stimulation, ultrasound, moist heat, and ice.    4. Carrying, Moving, and Handling Objects Functional Limitation     LTG 4: 12 weeks:  The patient will demonstrate 1-19 % limitation by achieving a score of 19 on the QuickDASH.    STATUS:  New   STG 4a: 8 weeks:  The patient will demonstrate 20-39 % limitation by achieving a score of 27 on the QuickDASH.      STATUS:  New   TREATMENT:  Manual therapy, therapeutic exercise, home exercise  instruction, and modalities as needed to include: moist heat, electrical stimulation, and ultrasound.     Plan  Planned modality interventions: cryotherapy and thermotherapy (hydrocollator packs)  Planned therapy interventions: body mechanics training, fine motor coordination training, flexibility, functional ROM exercises, home exercise program, joint mobilization, manual therapy, neuromuscular re-education, postural training, soft tissue mobilization, strengthening, stretching and therapeutic activities  Frequency: 2x week  Duration in weeks: 12  Treatment plan discussed with: patient    Pt is indicated for skilled occupational therapy services.  Timed:         Therapeutic Activity:     8     mins  [id number];       Un-Timed:  Low Eval     35     Mins  [id number]    Timed Treatment:   8   mins   Total Treatment:     43   mins    OT SIGNATURE: Yousuf Lynne OT   DATE TREATMENT INITIATED: 8/22/2023  [place] License: [id number]    Initial Certification  Certification Period: 11/19/2023  I certify that the therapy services are furnished while this patient is under my care.  The services outlined above are required by this patient, and will be reviewed every 90 days.     PHYSICIAN: Jv Norton MD      DATE:   MD NPI: [id number]  Please sign and return via fax to   [phone].. Thank you, [facility] Occupational Therapy.

## 2023-08-22 NOTE — OP NOTE
SHOULDER ARTHROSCOPY WITH ROTATOR CUFF REPAIR  Procedure Report    Patient Name:  Laxmi Jc  YOB: 1980    Date of Surgery:  8/21/2023     Indications:  Patient has failed conservative treatment and wishes to undergo operative treatment. Risks and benefits of operative treatment including bleeding, infection, damage to neurovascular structures, continued pain and disability, need for additional procedures, among others. Informed consent was obtained and they wished to proceed.    Pre-op Diagnosis:   Tear of left rotator cuff, unspecified tear extent, unspecified whether traumatic [M75.102]       Post-Op Diagnosis Codes:     * Tear of left rotator cuff, unspecified tear extent, unspecified whether traumatic [M75.102]  Left shoulder adhesive capsulitis  Left shoulder labral tear, SLAP tear    Procedure/CPTr Codes:      Procedure(s):  LEFT SHOULDER MANIPULATION,  ARTHROSCOPIC ROTATOR CUFF DEBRIDEMENT, labral debridement, subpectoral biceps tenodesis    Staff:  Surgeon(s):  Jv Norton MD    Assistant: Domingo Hemphill RN    Anesthesia: General    Estimated Blood Loss: 20 mL    Implants:    Implant Name Type Inv. Item Serial No.  Lot No. LRB No. Used Action   SYS IMP TENODESIS PROX - [id number] Implant SYS IMP TENODESIS PROX  [employer] [id number] Left 1 Implanted       Specimen:          None        Findings: labral tear    Complications: none    Description of Procedure: Operative site was marked in preoperative holding area.  Interscalene nerve block was performed by anesthesia.  Patient was brought the operating room and general anesthesia was applied.  The patient was placed into the lateral decubitus position and all bony prominences were padded.  The patient was secured with a deflated beanbag and an axillary roll was placed.  SCD boots were placed on the lower extremities and the down arm was placed in an arm board.  The operative extremity was prepped and draped in usual  sterile fashion and was placed in a sterile traction arm barreto.  Preoperative antibiotic was given and formal timeout was held.    We initially attempted to insert the arthroscope through the posterior portal into the glenohumeral joint.  However because of the significant arthrofibrosis and adhesive capsulitis within the joint the arthroscope was not easily insertable into the glenohumeral joint.  Therefore we did remove the arthroscope and performed a manipulation.  There was significant signs of adhesive capsulitis and restricted range of motion.  During the manipulation audible and palpable release of adhesions could be appreciated.  Full range of motion of the shoulder was achieved with full internal and external rotation and abduction and elevation.    The posterior portal was established and the arthroscope was inserted into the glenohumeral joint.  Visualization was much easier after the manipulation.  There were signs of adhesive capsulitis and synovial inflammation within the joint.  An anterior portal was established in the rotator interval and a cannula was inserted.  The glenohumeral joint was inspected and the intra-articular findings included a large displaced bucket-handle superior labral tear extending from the anterior to posterior superior labrum.  This was a type for type labral tear with a displaced labral fragment within the mid glenohumeral joint.  The labrum was debrided with the motorized shaver for a labral debridement.  We then also assessed the biceps tendon and found that it looked to be unstable and its attachment to the superior labrum very tenuous.    At this point the labrum was debrided with a motorized shaver.  The biceps tendon was released with an ablation probe for later tenodesis.      The rotator cuff was assessed and found to be intact with minimal articular sided fraying.  This was debrided with a motorized shaver for a rotator cuff debridement.  No evidence of high-grade  tearing.    At this point a 3 cm incision was made in the axillary region just below the pectoralis tendon.  Subcutaneous tissues and fascia were divided and the biceps tendon was identified and retrieved this evening.  A #2 fiber loop suture was used to create a running locking suture from the musculotendinous junction 25 mm proximally.  The excess tendon was removed and the sutures were placed through the biceps tendon button.  A bicortical drill pin was placed at the bicipital groove just below the pectoralis tendon.  A unicortical 5 mm drill hole was made and the bony debris was irrigated.  The biceps tendon button was placed bicortically and was flipped on the far cortex.  The sutures were pulled docking the tendon into the unicortical hole.  A suture was placed through the tendon and a knot was tied.  The tendon repair was secure.  The wound was irrigated and the subcutaneous tissues were closed with 3-0 undyed Vicryl.  The skin was closed with running 4-0 Monocryl Mastisol Steri-Strips were placed.  Local anesthetic was injected.  The portal incisions were irrigated and were closed with nylon suture.  Sterile dressings were placed.    Cold therapy and a shoulder immobilizer were placed.  Patient will from anesthesia in stable condition.  There were no complications.  All counts are correct and the patient was stable to recovery.    Assistant: Domingo Hemphill RN  was responsible for performing the following activities: Retraction, Suction, Irrigation, and Placing Dressing and their skilled assistance was necessary for the success of this case.    Jv Norton MD     Date: 8/22/2023  Time: 16:56 EDT

## 2023-08-23 ENCOUNTER — TREATMENT (OUTPATIENT)
Dept: PHYSICAL THERAPY | Facility: CLINIC | Age: 43
End: 2023-08-23
Payer: OTHER GOVERNMENT

## 2023-08-23 DIAGNOSIS — M25.512 ACUTE PAIN OF LEFT SHOULDER: ICD-10-CM

## 2023-08-23 DIAGNOSIS — M25.612 STIFFNESS OF LEFT SHOULDER JOINT: ICD-10-CM

## 2023-08-23 DIAGNOSIS — R29.898 WEAKNESS OF LEFT UPPER EXTREMITY: ICD-10-CM

## 2023-08-23 DIAGNOSIS — Z98.890 S/P SHOULDER SURGERY: Primary | ICD-10-CM

## 2023-08-23 DIAGNOSIS — Z98.890 STATUS POST DECOMPRESSION OF SUBACROMIAL SPACE: ICD-10-CM

## 2023-08-23 NOTE — PROGRESS NOTES
Occupational Therapy Daily Treatment Note  Yue OT: [address] [place] [address]      Patient: Laxmi Jc   : 1980  Diagnosis/ICD-10 Code:  S/P shoulder surgery [Z98.890]  Referring practitioner: Jv Norton MD  Date of Initial Visit: Type: THERAPY  Noted: 2023  Today's Date: 2023  Patient seen for 2 sessions             Subjective   Laxmi Jc reports: 6/10 Pt reports her sensation has returned.    Objective          Observations     Additional Shoulder Observation Details  Pt presents in sling to L arm.    Neurological Testing     Additional Neurological Details  Sensation has improved since eval.    Active Range of Motion     Additional Active Range of Motion Details  Deferred.  Pt unable to actively move L UE secondary to nerve block.    Passive Range of Motion     Right Shoulder   Flexion: 130 degrees   Abduction: WFL  External rotation 45ø: 20 degrees     Strength/Myotome Testing     Additional Strength Details  Deferred at this time.      See Exercise, Manual, and Modality Logs for complete treatment.       Assessment/Plan  OT provided pendulum and scap squeeze HEP. Teachback successful. Pt tolerated well this date.  Progress per Plan of Care           Timed:  Manual Therapy:    20     mins  21003;  Therapeutic Exercise:    10     mins  28816;          Timed Treatment:   30   mins   Total Treatment:     30   mins        Yousuf Lynne OT  Occupational Therapist  [place] License: [id number]  NPI: [id number]

## 2023-08-24 ENCOUNTER — TREATMENT (OUTPATIENT)
Dept: PHYSICAL THERAPY | Facility: CLINIC | Age: 43
End: 2023-08-24
Payer: OTHER GOVERNMENT

## 2023-08-24 DIAGNOSIS — Z98.890 S/P SHOULDER SURGERY: Primary | ICD-10-CM

## 2023-08-24 DIAGNOSIS — M25.512 ACUTE PAIN OF LEFT SHOULDER: ICD-10-CM

## 2023-08-24 DIAGNOSIS — R29.898 WEAKNESS OF LEFT UPPER EXTREMITY: ICD-10-CM

## 2023-08-24 DIAGNOSIS — Z98.890 STATUS POST DECOMPRESSION OF SUBACROMIAL SPACE: ICD-10-CM

## 2023-08-24 DIAGNOSIS — M25.612 STIFFNESS OF LEFT SHOULDER JOINT: ICD-10-CM

## 2023-08-24 NOTE — PROGRESS NOTES
Occupational Therapy Daily Treatment Note  Yue OT: [address] [place] [address]      Patient: Laxmi Jc   : 1980  Diagnosis/ICD-10 Code:  S/P shoulder surgery [Z98.890]  Referring practitioner: Jv Norton MD  Date of Initial Visit: Type: THERAPY  Noted: 2023  Today's Date: 2023  Patient seen for 3 sessions             Subjective   Laxmi Jc reports: 710 pain L shoulder.    Objective     See Exercise, Manual, and Modality Logs for complete treatment.       Assessment/Plan  OT provided passive elbow flex/ext and pro/sup HEP. Teachback successful. Pt displays more tenderness with gentle passive ER this date. Pt reports pain with table top passive flx. OT ended table top passive flx. Pt tolerated remainder of treatment well.  Progress per Plan of Care           Timed:  Manual Therapy:    20     mins  02672;  Therapeutic Exercise:    18     mins  35082;        Timed Treatment:   38   mins   Total Treatment:     38   mins        Yousuf Lynne OT  Occupational Therapist  [place] License: [id number]  NPI: [id number]

## 2023-08-25 ENCOUNTER — TREATMENT (OUTPATIENT)
Dept: PHYSICAL THERAPY | Facility: CLINIC | Age: 43
End: 2023-08-25
Payer: OTHER GOVERNMENT

## 2023-08-25 DIAGNOSIS — M25.512 ACUTE PAIN OF LEFT SHOULDER: ICD-10-CM

## 2023-08-25 DIAGNOSIS — Z98.890 S/P SHOULDER SURGERY: Primary | ICD-10-CM

## 2023-08-25 DIAGNOSIS — R29.898 WEAKNESS OF LEFT UPPER EXTREMITY: ICD-10-CM

## 2023-08-25 DIAGNOSIS — M25.612 STIFFNESS OF LEFT SHOULDER JOINT: ICD-10-CM

## 2023-08-25 DIAGNOSIS — Z98.890 STATUS POST DECOMPRESSION OF SUBACROMIAL SPACE: ICD-10-CM

## 2023-08-25 NOTE — PROGRESS NOTES
Occupational Therapy Daily Treatment Note  Yue OT: [address] [place] [address]      Patient: Laxmi Jc   : 1980  Diagnosis/ICD-10 Code:  S/P shoulder surgery [Z98.890]  Referring practitioner: Jv Norton MD  Date of Initial Visit: Type: THERAPY  Noted: 2023  Today's Date: 2023  Patient seen for 4 sessions             Subjective   Laxmi Jc reports: 5/10 pain. Pt reports shoulder is feeling better.    Objective     See Exercise, Manual, and Modality Logs for complete treatment.       Assessment/Plan  Pt tolerated well this date. Swelling is visibly down in the hand.  Progress per Plan of Care           Timed:  Manual Therapy:    18     mins  40315;  Therapeutic Exercise:    12     mins  74759;          Timed Treatment:   30   mins   Total Treatment:     30   mins        Yousuf Lynne OT  Occupational Therapist  [place] License: [id number]  NPI: [id number]

## 2023-08-29 ENCOUNTER — TREATMENT (OUTPATIENT)
Dept: PHYSICAL THERAPY | Facility: CLINIC | Age: 43
End: 2023-08-29
Payer: OTHER GOVERNMENT

## 2023-08-29 DIAGNOSIS — M25.512 ACUTE PAIN OF LEFT SHOULDER: ICD-10-CM

## 2023-08-29 DIAGNOSIS — Z98.890 STATUS POST DECOMPRESSION OF SUBACROMIAL SPACE: ICD-10-CM

## 2023-08-29 DIAGNOSIS — R29.898 WEAKNESS OF LEFT UPPER EXTREMITY: ICD-10-CM

## 2023-08-29 DIAGNOSIS — Z98.890 S/P SHOULDER SURGERY: Primary | ICD-10-CM

## 2023-08-29 DIAGNOSIS — M25.612 STIFFNESS OF LEFT SHOULDER JOINT: ICD-10-CM

## 2023-08-29 NOTE — PROGRESS NOTES
Occupational Therapy Daily Treatment Note  Yue OT: [address] [place] [address]      Patient: Laxmi Jc   : 1980  Diagnosis/ICD-10 Code:  S/P shoulder surgery [Z98.890]  Referring practitioner: Jv Norton MD  Date of Initial Visit: Type: THERAPY  Noted: 2023  Today's Date: 2023  Patient seen for 5 sessions             Subjective   Laxmi Jc reports: 3/10 pain L shoulder. Pt reports she has been having increased pain/stiffness in shoulder.    Objective     See Exercise, Manual, and Modality Logs for complete treatment.       Assessment/Plan  OT observed pendulums and provided verbal cues on proper body mechanics. Pt reports no pain while completing pendulums. Pt tolerated treatment well this date.  Progress per Plan of Care           Timed:  Manual Therapy:    16     mins  25274;  Therapeutic Exercise:    9     mins  41036;           Timed Treatment:   25   mins   Total Treatment:     25   mins        Yousuf Lynne OT  Occupational Therapist  [place] License: [id number]  NPI: [id number]

## 2023-08-30 ENCOUNTER — TREATMENT (OUTPATIENT)
Dept: PHYSICAL THERAPY | Facility: CLINIC | Age: 43
End: 2023-08-30
Payer: OTHER GOVERNMENT

## 2023-08-30 DIAGNOSIS — R29.898 WEAKNESS OF LEFT UPPER EXTREMITY: ICD-10-CM

## 2023-08-30 DIAGNOSIS — Z98.890 STATUS POST DECOMPRESSION OF SUBACROMIAL SPACE: ICD-10-CM

## 2023-08-30 DIAGNOSIS — M25.512 ACUTE PAIN OF LEFT SHOULDER: ICD-10-CM

## 2023-08-30 DIAGNOSIS — M25.612 STIFFNESS OF LEFT SHOULDER JOINT: ICD-10-CM

## 2023-08-30 DIAGNOSIS — Z98.890 S/P SHOULDER SURGERY: Primary | ICD-10-CM

## 2023-08-30 NOTE — PROGRESS NOTES
"Occupational Therapy Daily Treatment Note  Yue OT: [address] [place] [address]      Patient: Laxmi Jc   : 1980  Diagnosis/ICD-10 Code:  S/P shoulder surgery [Z98.890]  Referring practitioner: Jv Norton MD  Date of Initial Visit: Type: THERAPY  Noted: 2023  Today's Date: 2023  Patient seen for 6 sessions             Subjective   Laxmi Jc reports: \"It's the same\" 5/10 pain L shoulder.    Objective     See Exercise, Manual, and Modality Logs for complete treatment.       Assessment/Plan  Pt displays improved tolerance to ER this date.  Progress per Plan of Care           Timed:  Manual Therapy:    13     mins  05836;  Therapeutic Exercise:    10     mins  40658;            Timed Treatment:   23   mins   Total Treatment:     23   mins        Yousuf Lynne OT  Occupational Therapist  [place] License: [id number]  NPI: [id number]  "

## 2023-09-01 ENCOUNTER — TREATMENT (OUTPATIENT)
Dept: PHYSICAL THERAPY | Facility: CLINIC | Age: 43
End: 2023-09-01
Payer: OTHER GOVERNMENT

## 2023-09-01 DIAGNOSIS — M25.512 ACUTE PAIN OF LEFT SHOULDER: ICD-10-CM

## 2023-09-01 DIAGNOSIS — M25.612 STIFFNESS OF LEFT SHOULDER JOINT: ICD-10-CM

## 2023-09-01 DIAGNOSIS — Z98.890 S/P SHOULDER SURGERY: Primary | ICD-10-CM

## 2023-09-01 DIAGNOSIS — R29.898 WEAKNESS OF LEFT UPPER EXTREMITY: ICD-10-CM

## 2023-09-01 DIAGNOSIS — Z98.890 STATUS POST DECOMPRESSION OF SUBACROMIAL SPACE: ICD-10-CM

## 2023-09-01 NOTE — PROGRESS NOTES
Occupational Therapy Daily Treatment Note  Yue OT: [address] [place] [address]      Patient: Laxmi Jc   : 1980  Diagnosis/ICD-10 Code:  S/P shoulder surgery [Z98.890]  Referring practitioner: Jv Norton MD  Date of Initial Visit: Type: THERAPY  Noted: 2023  Today's Date: 2023  Patient seen for 7 sessions             Subjective   Laxmi Jc reports: 5/10 pain L shoulder.    Objective     See Exercise, Manual, and Modality Logs for complete treatment.       Assessment/Plan  Pt continues with pain into gentle passive flx. Pt tolerated remainder of treatment well.  Progress per Plan of Care           Timed:  Manual Therapy:    15     mins  92632;  Therapeutic Exercise:    13     mins  02956;          Timed Treatment:   28   mins   Total Treatment:     28   mins        Yousuf Lynne OT  Occupational Therapist  [place] License: [id number]  NPI: [id number]

## 2023-09-05 ENCOUNTER — OFFICE VISIT (OUTPATIENT)
Dept: ORTHOPEDIC SURGERY | Facility: CLINIC | Age: 43
End: 2023-09-05
Payer: OTHER GOVERNMENT

## 2023-09-05 VITALS
TEMPERATURE: 98 F | SYSTOLIC BLOOD PRESSURE: 126 MMHG | OXYGEN SATURATION: 94 % | HEART RATE: 66 BPM | DIASTOLIC BLOOD PRESSURE: 78 MMHG | HEIGHT: 62 IN | WEIGHT: 144.84 LBS | BODY MASS INDEX: 26.65 KG/M2

## 2023-09-05 DIAGNOSIS — M79.602 LEFT ARM PAIN: ICD-10-CM

## 2023-09-05 DIAGNOSIS — M75.102 TEAR OF LEFT ROTATOR CUFF, UNSPECIFIED TEAR EXTENT, UNSPECIFIED WHETHER TRAUMATIC: ICD-10-CM

## 2023-09-05 DIAGNOSIS — Z47.89 AFTERCARE FOLLOWING SURGERY OF THE MUSCULOSKELETAL SYSTEM: Primary | ICD-10-CM

## 2023-09-05 RX ORDER — ONDANSETRON 8 MG/1
8 TABLET, ORALLY DISINTEGRATING ORAL EVERY 8 HOURS PRN
Qty: 30 TABLET | Refills: 0 | Status: SHIPPED | OUTPATIENT
Start: 2023-09-05 | End: 2023-09-15

## 2023-09-05 RX ORDER — OXYCODONE AND ACETAMINOPHEN 7.5; 325 MG/1; MG/1
1-2 TABLET ORAL EVERY 6 HOURS PRN
Qty: 36 TABLET | Refills: 0 | Status: SHIPPED | OUTPATIENT
Start: 2023-09-05

## 2023-09-05 NOTE — PROGRESS NOTES
Chief Complaint  Pain and Follow-up of the Left Shoulder and Suture / Staple Removal    Subjective          History of Present Illness      Laxmi Jc is a 42 y.o. female  presents to [facility] for     Patient presents for 2-week postop evaluation of left shoulder manipulation with arthroscopic rotator cuff debridement, labral debridement and subpectoral bicep tenodesis, 2023.  Patient states she has had some pain in and she describes it as a burning pain in her bicep region and in her lateral shoulder into her tricep region.  She has concerned that she may have retorn her bicep with some of her therapy activities though she denies specific injury.  She states that she has been attending therapy at [facility] she states that her range of motion is improving.  She is taking pain medication sparingly she is requesting a refill.  She states the pain medication has been making her nauseous, she has not been prescribed Zofran for this yet.  She states the incisions have been healing well, denies drainage, denies dehiscence, sutures were removed today.  She presents in her sling.  She has been compliant with sling use she states she wears the sling with all activities, removes only for bathing and therapy.      Allergies   Allergen Reactions    Penicillins Rash    Sulfa Antibiotics Rash    Sulfamethoxazole-Trimethoprim Rash        Social History     Socioeconomic History    Marital status:    Tobacco Use    Smoking status: Former     Packs/day: 1.50     Years: 5.00     Pack years: 7.50     Types: Cigarettes     Start date:      Quit date:      Years since quittin.6    Smokeless tobacco: Never    Tobacco comments:     Has smoked this 23   Vaping Use    Vaping Use: Never used   Substance and Sexual Activity    Alcohol use: Never    Drug use: Never    Sexual activity: Defer        REVIEW OF SYSTEMS    Constitutional: Denies fevers, chills, weight  "loss  Cardiovascular: Denies chest pain, shortness of breath  Skin: Denies rashes, acute skin changes  Neurologic: Denies headache, loss of consciousness  MSK: Left shoulder pain      Objective   Vital Signs:   /78   Pulse 66   Temp 98 °F (36.7 °C)   Ht 157.5 cm (62\")   Wt 65.7 kg (144 lb 13.5 oz)   SpO2 94%   BMI 26.49 kg/m²     Body mass index is 26.49 kg/m².    Physical Exam         Left shoulder: Incisions are healing well, sutures removed and Steri-Strips were placed today.  No erythema, no drainage or dehiscence, no signs of infection, bicep appears intact, no visible deformity, no palpable deformity the bicep.  Patient is able to flex, extend her elbow without difficulty or pain, active forward elevation of the shoulder 90 active abduction 80, external rotation at side 40, neurovascular intact, patient able to wiggle fingers and thumb.  No pain with range of motion        Procedures    Imaging Results (Most Recent)       Procedure Component Value Units Date/Time    XR Humerus Left [[id number]] Resulted: 09/05/23 1217     Updated: 09/05/23 1217    Narrative:      View:AP/Lateral view(s)  Site: Left humerus  Indication: Left humerus pain  Study: X-rays ordered, taken in the office, and reviewed today  X-ray: Postoperative changes of bicep tenodesis with intact metallic   button, no signs of hardware failure,.  No fracture, no dislocation, no   acute osseous abnormality  Comparative data: No comparative studies             Result Review :   The following data was reviewed by: LORAINE Mcintosh on 09/05/2023:  Data reviewed : Radiologic studies reviewed by me with the patient              Assessment and Plan    Diagnoses and all orders for this visit:    1. Aftercare following surgery of LEFT SHOULDER MANIPULATION ARTHROSCOPIC ROTATOR CUFF DEBRIDEMENT AND SUBACROMIAL DECOMPRESSION labral debridement, manipulation, subpectoral biceps tenodesis 8/21/23 (Primary)    2. Left arm pain  -     XR " Humerus Left    Other orders  -     ondansetron ODT (ZOFRAN-ODT) 8 MG disintegrating tablet; Place 1 tablet on the tongue Every 8 (Eight) Hours As Needed for Nausea or Vomiting for up to 10 days.  Dispense: 30 tablet; Refill: 0        Reviewed operative images, operative report with the patient discussed diagnosis and treatment options with her, reviewed x-rays with her also.  Patient was advised to continue sling use with activities, she may take sling off when resting, work on gentle range of motion avoid heavy lift push pull with bicep flexion, continue physical therapy, she was given pain medication refill, she was also given Zofran prescription for nausea, follow-up in 2 weeks for recheck, follow-up sooner if any new or concerning symptoms occur, patient agreed    Call or return if worsening symptoms.    Follow Up   Return in about 2 weeks (around 9/19/2023) for Recheck.  Patient was given instructions and counseling regarding her condition or for health maintenance advice. Please see specific information pulled into the AVS if appropriate.

## 2023-09-06 ENCOUNTER — TREATMENT (OUTPATIENT)
Dept: PHYSICAL THERAPY | Facility: CLINIC | Age: 43
End: 2023-09-06
Payer: OTHER GOVERNMENT

## 2023-09-06 DIAGNOSIS — M25.512 ACUTE PAIN OF LEFT SHOULDER: ICD-10-CM

## 2023-09-06 DIAGNOSIS — Z98.890 STATUS POST DECOMPRESSION OF SUBACROMIAL SPACE: ICD-10-CM

## 2023-09-06 DIAGNOSIS — M25.612 STIFFNESS OF LEFT SHOULDER JOINT: ICD-10-CM

## 2023-09-06 DIAGNOSIS — R29.898 WEAKNESS OF LEFT UPPER EXTREMITY: ICD-10-CM

## 2023-09-06 DIAGNOSIS — Z98.890 S/P SHOULDER SURGERY: Primary | ICD-10-CM

## 2023-09-06 NOTE — PROGRESS NOTES
"Occupational Therapy Daily Treatment Note  Yue OT: [address] [place] [address]      Patient: Laxmi Jc   : 1980  Diagnosis/ICD-10 Code:  S/P shoulder surgery [Z98.890]  Referring practitioner: Jv Norton MD  Date of Initial Visit: Type: THERAPY  Noted: 2023  Today's Date: 2023  Patient seen for 8 sessions             Subjective   Laxmi Jc reports: \"It's better\" 4/10 pain L shoulder.    Objective     See Exercise, Manual, and Modality Logs for complete treatment.       Assessment/Plan  Pt tolerated AA flexion on theraball well today (better than table top previously). Pt displays good AA flexion with wall ladder and pulleys.  Progress per Plan of Care           Timed:  Manual Therapy:    20     mins  17539;  Therapeutic Exercise:    10     mins  17509;     Therapeutic Activity:     8     mins  32125;       Timed Treatment:   38   mins   Total Treatment:     38   mins        Yousuf Lynne OT  Occupational Therapist  [place] License: [id number]  NPI: [id number]  "
Yes

## 2023-09-08 ENCOUNTER — TREATMENT (OUTPATIENT)
Dept: PHYSICAL THERAPY | Facility: CLINIC | Age: 43
End: 2023-09-08
Payer: OTHER GOVERNMENT

## 2023-09-08 DIAGNOSIS — Z98.890 S/P SHOULDER SURGERY: Primary | ICD-10-CM

## 2023-09-08 DIAGNOSIS — M25.512 ACUTE PAIN OF LEFT SHOULDER: ICD-10-CM

## 2023-09-08 DIAGNOSIS — R29.898 WEAKNESS OF LEFT UPPER EXTREMITY: ICD-10-CM

## 2023-09-08 DIAGNOSIS — M25.612 STIFFNESS OF LEFT SHOULDER JOINT: ICD-10-CM

## 2023-09-08 DIAGNOSIS — Z98.890 STATUS POST DECOMPRESSION OF SUBACROMIAL SPACE: ICD-10-CM

## 2023-09-08 NOTE — PROGRESS NOTES
"Occupational Therapy Daily Treatment Note  Yue OT: [address] [place] [address]      Patient: Laxmi Jc   : 1980  Diagnosis/ICD-10 Code:  S/P shoulder surgery [Z98.890]  Referring practitioner: Jv Norton MD  Date of Initial Visit: Type: THERAPY  Noted: 2023  Today's Date: 2023  Patient seen for 9 sessions             Subjective   Laxmi cJ reports: 5/10 pain. \"It usually hurts more in the morning\"    Objective     See Exercise, Manual, and Modality Logs for complete treatment.       Assessment/Plan  Pt reports shoulder feels tight throughout session but reports no increase in pain.  Progress per Plan of Care           Timed:  Manual Therapy:    15     mins  68646;  Therapeutic Exercise:    15     mins  08604;      Therapeutic Activity:     8     mins  33123;       Timed Treatment:   38   mins   Total Treatment:     38   mins        Yousuf Lynne OT  Occupational Therapist  [place] License: [id number]  NPI: [id number]  "

## 2023-09-09 ENCOUNTER — PATIENT MESSAGE (OUTPATIENT)
Dept: FAMILY MEDICINE CLINIC | Facility: CLINIC | Age: 43
End: 2023-09-09
Payer: OTHER GOVERNMENT

## 2023-09-09 DIAGNOSIS — M34.9 SCLERODERMA: Primary | ICD-10-CM

## 2023-09-11 NOTE — TELEPHONE ENCOUNTER
From: Laxmi Jc  To: Misa Chavez  Sent: 9/9/2023 6:20 PM EDT  Subject: Referral    Hello!     I would like to request a referral to [facility] rheumatologist to treat my Scleraderma. As you know, my daughter Clarita was recently diagnosed with the same thing (CREST) disease. You recommended I take her to the [facility] children’s rheumatologist ; and I was very impressed with the team of  who specialize in this disease at [facility].    Please can you submit a referral so I can switch to the [facility] rheumatologist (Scleraderma specialist team)?    I thank you in advanced!    Laxmi Jc

## 2023-09-12 ENCOUNTER — TREATMENT (OUTPATIENT)
Dept: PHYSICAL THERAPY | Facility: CLINIC | Age: 43
End: 2023-09-12
Payer: OTHER GOVERNMENT

## 2023-09-12 DIAGNOSIS — M25.612 STIFFNESS OF LEFT SHOULDER JOINT: ICD-10-CM

## 2023-09-12 DIAGNOSIS — Z98.890 STATUS POST DECOMPRESSION OF SUBACROMIAL SPACE: ICD-10-CM

## 2023-09-12 DIAGNOSIS — Z98.890 S/P SHOULDER SURGERY: Primary | ICD-10-CM

## 2023-09-12 DIAGNOSIS — R29.898 WEAKNESS OF LEFT UPPER EXTREMITY: ICD-10-CM

## 2023-09-12 DIAGNOSIS — M25.512 ACUTE PAIN OF LEFT SHOULDER: ICD-10-CM

## 2023-09-12 NOTE — PROGRESS NOTES
OT Re-evaluation/Progress Note  Yue  OT: [address]  [place] [address]    Patient: Laxmi Jc   : 1980  Diagnosis/ICD-10 Code:  S/P shoulder surgery [Z98.890]  Referring practitioner: Jv Norton MD  Date of Initial Visit: Type: THERAPY  Noted: 2023  Today's Date: 2023  Patient seen for 10 sessions      Subjective:   Subjective Questionnaire: QuickDASH: 32/55  Clinical Progress: improved  Home Program Compliance: Yes  Treatment has included: therapeutic exercise, manual therapy, and therapeutic activity    Subjective Evaluation    Pain  Current pain ratin  Location: L shoulder       Objective          Observations     Additional Shoulder Observation Details  Well healing incision to L shoulder.    Neurological Testing     Additional Neurological Details  Pt reports numbness along medial elbow.    Active Range of Motion     Additional Active Range of Motion Details  Deferred.  Pt unable to actively move L UE secondary to nerve block.    Passive Range of Motion     Right Shoulder   Flexion: 122 degrees   Abduction: 150 degrees   External rotation 45°: 30 degrees with pain    Strength/Myotome Testing     Additional Strength Details  Deferred at this time.    Assessment & Plan       Assessment  Impairments: abnormal or restricted ROM, activity intolerance, impaired physical strength, lacks appropriate home exercise program and pain with function   Functional limitations: carrying objects, lifting, sleeping, pulling, pushing, uncomfortable because of pain, moving in bed, reaching behind back and reaching overhead   Assessment details: Pt reports decreased pain. ROM remains consistent. Pt met 2/5 STGs. Pt continues with decreased fxl strength/ROM that limits ability to complete ADLs/IADLs.  Prognosis: good    Goals  Plan Goals: SHOULDER  PROBLEMS:     1. The patient has limited ROM of the L shoulder.    LTG 1: 12 weeks:  The patient will demonstrate 140 degrees of active  shoulder flexion, 140 degrees of shoulder abduction, external rotation to behind head, and internal rotation to lumbar spine to allow the patient to reach into upper kitchen cabinets and manipulate clothing behind the back with greater ease.    STATUS:  New   STG 1a: 8 weeks:  The patient will demonstrate 140 degrees of passive shoulder flexion, 140 degrees of passive shoulder abduction, 40 degrees of passive shoulder external rotation, and 40 degrees of passive shoulder internal rotation.    STATUS:  Not met   TREATMENT: Manual therapy, therapeutic exercise, home exercise instruction, and modalities as needed to include: electrical stimulation, ultrasound, moist heat, and ice.    2. The patient has limited strength of the L shoulder.   LTG 2: 12 weeks:  The patient will demonstrate 5 /5 strength for L shoulder flexion, abduction, external rotation, and internal rotation in order to demonstrate improved shoulder stability.    STATUS:  New   STG 2a: 8 weeks:  The patient will demonstrate ability to tolerate MMT for L shoulder flexion, abduction, external rotation, and internal rotation.    STATUS:  New   STG2b:  8 weeks:  The patient will be independent with home exercises.     STATUS:  Met   TREATMENT: Manual therapy, therapeutic exercise, home exercise instruction, and modalities as needed to include: electrical stimulation, ultrasound, moist heat, and ice.     3. The patient complains of pain to the L shoulder.   LTG 3: 12 weeks:  The patient will report a pain rating of 1 /10 or better in order to improve sleep quality and tolerance to performance of activities of daily living.    STATUS:  New   STG 3a: 8 weeks:  The patient will report a pain rating of 3 /10 or better.     STATUS:  Met   TREATMENT: Manual therapy, therapeutic exercise, home exercise instruction, and modalities as needed to include: electrical stimulation, ultrasound, moist heat, and ice.    4. Carrying, Moving, and Handling Objects Functional  Limitation     LTG 4: 12 weeks:  The patient will demonstrate 1-19 % limitation by achieving a score of 19 on the QuickDASH.    STATUS:  New   STG 4a: 8 weeks:  The patient will demonstrate 20-39 % limitation by achieving a score of 27 on the QuickDASH.      STATUS:  Not met   TREATMENT:  Manual therapy, therapeutic exercise, home exercise instruction, and modalities as needed to include: moist heat, electrical stimulation, and ultrasound.     Plan  Planned modality interventions: cryotherapy and thermotherapy (hydrocollator packs)  Planned therapy interventions: body mechanics training, fine motor coordination training, flexibility, functional ROM exercises, home exercise program, joint mobilization, manual therapy, neuromuscular re-education, postural training, soft tissue mobilization, strengthening, stretching and therapeutic activities  Frequency: 2x week  Duration in weeks: 12  Treatment plan discussed with: patient    Progress toward previous goals: Partially Met      Recommendations: Continue as planned  Timeframe: 3 months  Prognosis to achieve goals: good    OT SIGNATURE: Yousuf Lynne OT   DATE TREATMENT INITIATED: Type: THERAPY  Noted: 8/22/2023  [place] License: [id number]    Based upon review of the patient's progress and continued therapy plan, it is my medical opinion that Laxmi Jc should continue occupational therapy treatment at [facility]  [address] [address] [address] [address] [phone].    Signature: __________________________________  Jv Norton MD MD NPI: [id number]  Timed:  Manual Therapy:    8     mins  87577;  Therapeutic Exercise:    15     mins  27890;        Timed Treatment:   23   mins   Total Treatment:     23   mins  Please sign and return via fax to [phone]  . Thank you, [facility].

## 2023-09-13 ENCOUNTER — OFFICE VISIT (OUTPATIENT)
Dept: CARDIOLOGY | Facility: CLINIC | Age: 43
End: 2023-09-13
Payer: OTHER GOVERNMENT

## 2023-09-13 VITALS
BODY MASS INDEX: 26.94 KG/M2 | DIASTOLIC BLOOD PRESSURE: 78 MMHG | WEIGHT: 146.38 LBS | HEIGHT: 62 IN | SYSTOLIC BLOOD PRESSURE: 117 MMHG | HEART RATE: 88 BPM

## 2023-09-13 DIAGNOSIS — R00.2 PALPITATIONS: Primary | ICD-10-CM

## 2023-09-13 DIAGNOSIS — R07.89 CHEST WALL PAIN: ICD-10-CM

## 2023-09-13 NOTE — PROGRESS NOTES
Chief Complaint  Palpitations, Follow-up, and Chest Pain (Pt had surgery 3 weeks ago on left arm. Pt now c/o of chest pain and some arm and hand numbness. )    Subjective        History of Present Illness  Laxmi Jc presents to [facility]   Ms. Jc is a 42-year-old female patient coming in today for cardiac follow-up of palpitations, and complaining of some recent episodes of chest pain.  Palpitation symptoms previously well controlled, she notes increased frequency recently, which she describes as a fluttering jumping sensation.   She had shoulder surgery approximately 3 weeks ago for bicep tear, and for the last week she is noticing having episodes of chest pain with radiation her arm, she initially attributed to her surgery, however she is concerned because she is having some numbness in her arm.  She is not having any associated shortness of breath, lightheadedness or dizziness.        Past History:     1) Pituitary adenoma; 2) Unspecified autoimmune disorder; 3) Depression; 4) Negative for diabetes mellitus, hypertension, and hyperlipidemia. 5) overactive bladder    Past Medical History:   Diagnosis Date    Adhesive capsulitis of left shoulder     Anxiety     Low back pain     Migraines     Palpitations     Pituitary adenoma     Polymenorrhea     Scleroderma     Tear of left rotator cuff        Allergies   Allergen Reactions    Penicillins Rash    Sulfa Antibiotics Rash    Sulfamethoxazole-Trimethoprim Rash        Past Surgical History:   Procedure Laterality Date    BREAST LUMPECTOMY Left     FAT GRAFTING Left     repair of lumpectomy    KIDNEY STONE SURGERY      OVARIAN CYST REMOVAL      SHOULDER ARTHROSCOPY W/ ROTATOR CUFF REPAIR Left 8/21/2023    Procedure: LEFT SHOULDER MANIPULATION,  ARTHROSCOPY ROTATOR CUFF DEBRIDEMENT AND SUBACROMIAL DECOMPRESSION , labral debridement, subpectoral biceps tenodesis;  Surgeon: Jv Norton MD;  Location: [facility];   Service: Orthopedics;  Laterality: Left;        Social History  She  reports that she quit smoking about 8 months ago. Her smoking use included cigarettes. She started smoking about 5 years ago. She has a 7.50 pack-year smoking history. She has never used smokeless tobacco. She reports that she does not drink alcohol and does not use drugs.    Family History  Her family history includes Autoimmune disease in her mother; Bleeding Disorder in her mother; Cancer in her maternal grandmother and mother; Diabetes in her father; Hepatitis in her mother; Hyperlipidemia in her father; Hypertension in her father; Kidney disease in her mother; Stroke in her father.       Current Outpatient Medications on File Prior to Visit   Medication Sig    ibuprofen (ADVIL,MOTRIN) 800 MG tablet Take 1 tablet by mouth Every 8 (Eight) Hours As Needed for Mild Pain.    metoprolol succinate XL (TOPROL-XL) 25 MG 24 hr tablet Take 1 tablet by mouth Daily. (Patient taking differently: Take 1 tablet by mouth Every Night. Takes for palpitations no htn)    naloxone (NARCAN) 4 MG/0.1ML nasal spray Call 911. Don't prime. Deale in 1 nostril for overdose. Repeat in 2-3 minutes in other nostril if no or minimal breathing/responsiveness.    Norditropin FlexPro 5 MG/1.5ML solution pen-injector Inject 0.25 mg under the skin into the appropriate area as directed. REPORTS THAT TAKES NORMALLY MONDAY THROUGH FRIDAY BUT HER LAST DOSE WAS OVER 2 WEEKS STOPPED ON OWN DIDN'T KNOW IF WOULD INTERFERE WITH HER SURGERY    oxyCODONE-acetaminophen (PERCOCET) 7.5-325 MG per tablet Take 1-2 tablets by mouth Every 6 (Six) Hours As Needed for Moderate Pain.    topiramate (TOPAMAX) 100 MG tablet Take 1 tablet by mouth 2 (Two) Times a Day.     No current facility-administered medications on file prior to visit.         Review of Systems   Constitutional:  Negative for fatigue.   Respiratory:  Negative for cough, chest tightness and shortness of breath.    Cardiovascular:   "Positive for chest pain and palpitations (Very intermittent). Negative for leg swelling.   Gastrointestinal:  Negative for nausea and vomiting.   Musculoskeletal:  Positive for arthralgias.   Neurological:  Negative for dizziness and syncope.   Hematological:  Does not bruise/bleed easily.      Objective   Vitals:    09/13/23 1010   BP: 117/78   BP Location: Right arm   Patient Position: Sitting   Pulse: 88   Weight: 66.4 kg (146 lb 6 oz)   Height: 157.5 cm (62\")         Physical Exam  General : Alert, awake, no acute distress  Neck : Supple, no carotid bruit, no jugular venous distention  CVS : Regular rate and rhythm, no murmur, rubs or gallops  Lungs: Clear to auscultation bilaterally, no crackles or rhonchi  Abdomen: Soft, nontender, bowel sounds active  Extremities: Warm, well-perfused, no pedal edema  Skeletal: Left arm mobility limited, and currently in sling.      Result Review     The following data was reviewed by ISAAC Jean  No results found for: PROBNP  CMP          12/20/2022    12:03   CMP   Glucose 97    BUN 12    Creatinine 0.83    EGFR 90.4    Sodium 140    Potassium 3.7    Chloride 110    Calcium 9.0    Total Protein 6.7    Albumin 4.00    Globulin 2.7    Total Bilirubin 0.2    Alkaline Phosphatase 72    AST (SGOT) 18    ALT (SGPT) 14    Albumin/Globulin Ratio 1.5    BUN/Creatinine Ratio 14.5    Anion Gap 8.0      CBC w/diff          12/20/2022    12:03   CBC w/Diff   WBC 5.86    RBC 4.34    Hemoglobin 13.7    Hematocrit 41.0    MCV 94.5    MCH 31.6    MCHC 33.4    RDW 12.9    Platelets 337    Neutrophil Rel % 49.2    Immature Granulocyte Rel % 0.2    Lymphocyte Rel % 30.4    Monocyte Rel % 10.2    Eosinophil Rel % 9.0    Basophil Rel % 1.0       Lab Results   Component Value Date    TSH 1.430 12/20/2022      Lab Results   Component Value Date    FREET4 1.0 03/03/2021      No results found for: DDIMERQUANT  Magnesium   Date Value Ref Range Status   03/03/2021 1.86 1.60 - 2.30 mg/dL Final "      No results found for: DIGOXIN   Lab Results   Component Value Date    TROPONINT <0.01 03/03/2021           Lipid Panel          12/20/2022    12:03   Lipid Panel   Total Cholesterol 160    Triglycerides 185    HDL Cholesterol 30    VLDL Cholesterol 32    LDL Cholesterol  98    LDL/HDL Ratio 3.10        Echocardiogram from 12/9/2020    CONCLUSION:  1.  Normal left ventricular systolic function with a calculated LV ejection fraction of 66%.   2.  Normal diastolic function of the left ventricle.   3.  No hemodynamically significant valvular abnormalities.      Holter Monitor  from 12/9/2020    CONCLUSION:  Forty-eight Holter monitor study showing sinus rhythm and a 5-beat run of supraventricular ectopy. There was no correlation of patient symptoms to any arrhythmias.      ECG 12 Lead    Date/Time: 9/13/2023 10:37 AM  Performed by: Delphine Mejia APRN  Authorized by: Delphine Mejia APRN   Comparison: compared with previous ECG from 3/30/2021  Similar to previous ECG  Rhythm: sinus rhythm  Rate: normal  Conduction: conduction normal  ST Segments: ST segments normal  QRS axis: normal    Clinical impression: normal ECG            Assessment and Plan   Diagnoses and all orders for this visit:    1. Palpitations (Primary)  Assessment & Plan:  Encourage limiting caffeine, avoiding nicotine or other stimulant products.  Continue current dose of metoprolol for symptom management.  If she has prolonged worsening symptoms, she can take an additional half tablet.  We will check labs to make sure there is no other contributing factors worsening her symptoms.  EKG was fairly unremarkable, and on exam she does not have any extra systole.    Orders:  -     Basic Metabolic Panel; Future  -     TSH; Future  -     Magnesium; Future  -     ECG 12 Lead    2. Chest wall pain  Assessment & Plan:  Her symptoms are atypical for cardiac chest pain, and her EKG is normal.  Neck her pains are secondary to her recent shoulder surgery and  I would encourage her to follow-up with orthopedics and physical therapy.              Follow Up   Return for Next scheduled follow up, with Dr. Spann, As already scheduled.    Patient was given instructions and counseling regarding her condition or for health maintenance advice. Please see specific information pulled into the AVS if appropriate.     Signed,  Delphine Mejia, APRN  09/13/2023     Dictated Utilizing Dragon Dictation: Please note that portions of this note were completed with a voice recognition program.  Part of this note may be an electronic transcription/translation of spoken language to printed text using the Dragon Dictation System.

## 2023-09-15 ENCOUNTER — TREATMENT (OUTPATIENT)
Dept: PHYSICAL THERAPY | Facility: CLINIC | Age: 43
End: 2023-09-15
Payer: OTHER GOVERNMENT

## 2023-09-15 DIAGNOSIS — Z98.890 STATUS POST DECOMPRESSION OF SUBACROMIAL SPACE: ICD-10-CM

## 2023-09-15 DIAGNOSIS — M25.512 ACUTE PAIN OF LEFT SHOULDER: ICD-10-CM

## 2023-09-15 DIAGNOSIS — R29.898 WEAKNESS OF LEFT UPPER EXTREMITY: ICD-10-CM

## 2023-09-15 DIAGNOSIS — Z98.890 S/P SHOULDER SURGERY: Primary | ICD-10-CM

## 2023-09-15 DIAGNOSIS — M25.612 STIFFNESS OF LEFT SHOULDER JOINT: ICD-10-CM

## 2023-09-15 NOTE — PROGRESS NOTES
Occupational Therapy Daily Treatment Note  [place] OT: [address] [place] [address]      Patient: Laxmi Jc   : 1980  Diagnosis/ICD-10 Code:  S/P shoulder surgery [Z98.890]  Referring practitioner: Jv Norton MD  Date of Initial Visit: Type: THERAPY  Noted: 2023  Today's Date: 9/15/2023  Patient seen for 11 sessions             Subjective   Laxmi Jc reports: 2-3/10 pain L shoulder. Pt reports L side of neck has been painful over the last 3-4 days.    Objective     See Exercise, Manual, and Modality Logs for complete treatment.       Assessment/Plan  Pt displays good PROM this date. OT provided cross body stretch and lateral neck stretch HEP. Teachback successful.  Progress per Plan of Care           Timed:  Manual Therapy:    10     mins  56755;  Therapeutic Exercise:    20     mins  25670;     Therapeutic Activity:     8     mins  48237;       Timed Treatment:   38   mins   Total Treatment:     38   mins        Yousuf Lynne OT  Occupational Therapist  [place] License: [id number]  NPI: [id number]

## 2023-09-19 ENCOUNTER — TREATMENT (OUTPATIENT)
Dept: PHYSICAL THERAPY | Facility: CLINIC | Age: 43
End: 2023-09-19
Payer: OTHER GOVERNMENT

## 2023-09-19 DIAGNOSIS — Z98.890 STATUS POST DECOMPRESSION OF SUBACROMIAL SPACE: ICD-10-CM

## 2023-09-19 DIAGNOSIS — M25.512 ACUTE PAIN OF LEFT SHOULDER: ICD-10-CM

## 2023-09-19 DIAGNOSIS — M25.612 STIFFNESS OF LEFT SHOULDER JOINT: ICD-10-CM

## 2023-09-19 DIAGNOSIS — R29.898 WEAKNESS OF LEFT UPPER EXTREMITY: ICD-10-CM

## 2023-09-19 DIAGNOSIS — Z98.890 S/P SHOULDER SURGERY: Primary | ICD-10-CM

## 2023-09-19 NOTE — PROGRESS NOTES
"Occupational Therapy Daily Treatment Note  [place] OT: [address] [place] [address]      Patient: Laxmi Jc   : 1980  Diagnosis/ICD-10 Code:  S/P shoulder surgery [Z98.890]  Referring practitioner: Jv Norton MD  Date of Initial Visit: Type: THERAPY  Noted: 2023  Today's Date: 2023  Patient seen for 12 sessions             Subjective   Laxmi Jc reports: \"It's not really pain just kind of achiness\" 2/10 L shoulder.    Objective     See Exercise, Manual, and Modality Logs for complete treatment.       Assessment/Plan  Pt tolerated well this date.   Progress per Plan of Care           Timed:  Manual Therapy:    10     mins  27026;  Therapeutic Exercise:    13     mins  57531;        Timed Treatment:   23   mins   Total Treatment:     23   mins        Yousuf Lynne OT  Occupational Therapist  [place] License: [id number]  NPI: [id number]  "

## 2023-09-22 ENCOUNTER — TREATMENT (OUTPATIENT)
Dept: PHYSICAL THERAPY | Facility: CLINIC | Age: 43
End: 2023-09-22
Payer: OTHER GOVERNMENT

## 2023-09-22 DIAGNOSIS — M25.612 STIFFNESS OF LEFT SHOULDER JOINT: ICD-10-CM

## 2023-09-22 DIAGNOSIS — Z98.890 S/P SHOULDER SURGERY: Primary | ICD-10-CM

## 2023-09-22 DIAGNOSIS — M25.512 ACUTE PAIN OF LEFT SHOULDER: ICD-10-CM

## 2023-09-22 DIAGNOSIS — R29.898 WEAKNESS OF LEFT UPPER EXTREMITY: ICD-10-CM

## 2023-09-22 DIAGNOSIS — Z98.890 STATUS POST DECOMPRESSION OF SUBACROMIAL SPACE: ICD-10-CM

## 2023-09-22 NOTE — PROGRESS NOTES
Occupational Therapy Daily Treatment Note  [place] OT: [address] [place] [address]      Patient: Laxmi Jc   : 1980  Diagnosis/ICD-10 Code:  S/P shoulder surgery [Z98.890]  Referring practitioner: Jv Norton MD  Date of Initial Visit: Type: THERAPY  Noted: 2023  Today's Date: 2023  Patient seen for 13 sessions             Subjective   Laxmi Jc reports: 3/10 pain L distal biceps. Pt reports increased pain last night in the distal biceps/elbow that prevented her from sleeping. She reports her hand was turning purple last night. Pt reports she has not been taking her pain medicine as much due to having to drive more. Pt reports no pain in shoulder.    Objective     See Exercise, Manual, and Modality Logs for complete treatment.       Assessment/Plan  Pt displays improved tolerance to passive ER this date. Pt tolerated treatment well this date.  Progress per Plan of Care           Timed:  Manual Therapy:    10     mins  96164;  Therapeutic Exercise:    28     mins  55543;       Timed Treatment:   38   mins   Total Treatment:     38   mins        Yousuf Lynne OT  Occupational Therapist  [place] License: [id number]  NPI: [id number]

## 2023-09-26 ENCOUNTER — TREATMENT (OUTPATIENT)
Dept: PHYSICAL THERAPY | Facility: CLINIC | Age: 43
End: 2023-09-26
Payer: OTHER GOVERNMENT

## 2023-09-26 DIAGNOSIS — Z98.890 STATUS POST DECOMPRESSION OF SUBACROMIAL SPACE: ICD-10-CM

## 2023-09-26 DIAGNOSIS — M25.612 STIFFNESS OF LEFT SHOULDER JOINT: ICD-10-CM

## 2023-09-26 DIAGNOSIS — R29.898 WEAKNESS OF LEFT UPPER EXTREMITY: ICD-10-CM

## 2023-09-26 DIAGNOSIS — M25.512 ACUTE PAIN OF LEFT SHOULDER: ICD-10-CM

## 2023-09-26 DIAGNOSIS — Z98.890 S/P SHOULDER SURGERY: Primary | ICD-10-CM

## 2023-09-26 NOTE — PROGRESS NOTES
Occupational Therapy Daily Treatment Note  [place] OT: [address] [place]  [place] [address]      Patient: Laxmi Jc   : 1980  Diagnosis/ICD-10 Code:  S/P shoulder surgery [Z98.890]  Referring practitioner: Jv Norton MD  Date of Initial Visit: Type: THERAPY  Noted: 2023  Today's Date: 2023  Patient seen for 14 sessions             Subjective   Laxmi Jc reports: 3/10 pain L shoulder.    Objective     See Exercise, Manual, and Modality Logs for complete treatment.       Assessment/Plan  Pt tolerated treatment well this date. Pt tolerated ER better today.  Progress per Plan of Care           Timed:  Manual Therapy:    10     mins  67070;  Therapeutic Exercise:    13     mins  35002;         Timed Treatment:   23   mins   Total Treatment:     23   mins        Yousuf Lynne OT  Occupational Therapist  [place] License: [id number]  NPI: [id number]

## 2023-09-28 ENCOUNTER — TREATMENT (OUTPATIENT)
Dept: PHYSICAL THERAPY | Facility: CLINIC | Age: 43
End: 2023-09-28
Payer: OTHER GOVERNMENT

## 2023-09-28 DIAGNOSIS — M25.612 STIFFNESS OF LEFT SHOULDER JOINT: ICD-10-CM

## 2023-09-28 DIAGNOSIS — Z98.890 S/P SHOULDER SURGERY: Primary | ICD-10-CM

## 2023-09-28 DIAGNOSIS — M25.512 ACUTE PAIN OF LEFT SHOULDER: ICD-10-CM

## 2023-09-28 DIAGNOSIS — R29.898 WEAKNESS OF LEFT UPPER EXTREMITY: ICD-10-CM

## 2023-09-28 DIAGNOSIS — Z98.890 STATUS POST DECOMPRESSION OF SUBACROMIAL SPACE: ICD-10-CM

## 2023-09-28 PROBLEM — R07.89 CHEST WALL PAIN: Status: ACTIVE | Noted: 2023-09-28

## 2023-09-28 NOTE — ASSESSMENT & PLAN NOTE
Her symptoms are atypical for cardiac chest pain, and her EKG is normal.  Neck her pains are secondary to her recent shoulder surgery and I would encourage her to follow-up with orthopedics and physical therapy.

## 2023-09-28 NOTE — PROGRESS NOTES
Occupational Therapy Daily Treatment Note  [place] OT: [address] [place] [address]      Patient: Laxmi Jc   : 1980  Diagnosis/ICD-10 Code:  S/P shoulder surgery [Z98.890]  Referring practitioner: Jv Norton MD  Date of Initial Visit: Type: THERAPY  Noted: 2023  Today's Date: 2023  Patient seen for 15 sessions             Subjective   Laxmi Jc reports: 2/10 pain L shoulder. Pt reports she has had nerve pain throughout her arm that gets worse with movement and typing since surgery. She reports she feels like this has been getting worse.    Objective     See Exercise, Manual, and Modality Logs for complete treatment.       Assessment/Plan  Pt tolerated well. PROM continues to improve.  Progress per Plan of Care           Timed:  Manual Therapy:    10     mins  74126;  Therapeutic Exercise:    28     mins  17264;          Timed Treatment:   38   mins   Total Treatment:     38   mins        Yousuf Lynne OT  Occupational Therapist  [place] License: [id number]  NPI: [id number]

## 2023-09-28 NOTE — ASSESSMENT & PLAN NOTE
Encourage limiting caffeine, avoiding nicotine or other stimulant products.  Continue current dose of metoprolol for symptom management.  If she has prolonged worsening symptoms, she can take an additional half tablet.  We will check labs to make sure there is no other contributing factors worsening her symptoms.  EKG was fairly unremarkable, and on exam she does not have any extra systole.

## 2023-10-03 ENCOUNTER — TELEPHONE (OUTPATIENT)
Dept: ORTHOPEDIC SURGERY | Facility: CLINIC | Age: 43
End: 2023-10-03

## 2023-10-03 ENCOUNTER — OFFICE VISIT (OUTPATIENT)
Dept: FAMILY MEDICINE CLINIC | Facility: CLINIC | Age: 43
End: 2023-10-03
Payer: OTHER GOVERNMENT

## 2023-10-03 ENCOUNTER — TREATMENT (OUTPATIENT)
Dept: PHYSICAL THERAPY | Facility: CLINIC | Age: 43
End: 2023-10-03
Payer: OTHER GOVERNMENT

## 2023-10-03 ENCOUNTER — TRANSCRIBE ORDERS (OUTPATIENT)
Dept: FAMILY MEDICINE CLINIC | Facility: CLINIC | Age: 43
End: 2023-10-03
Payer: OTHER GOVERNMENT

## 2023-10-03 ENCOUNTER — OFFICE VISIT (OUTPATIENT)
Dept: ORTHOPEDIC SURGERY | Facility: CLINIC | Age: 43
End: 2023-10-03
Payer: OTHER GOVERNMENT

## 2023-10-03 VITALS
TEMPERATURE: 97.6 F | OXYGEN SATURATION: 98 % | BODY MASS INDEX: 26.87 KG/M2 | SYSTOLIC BLOOD PRESSURE: 118 MMHG | HEART RATE: 80 BPM | WEIGHT: 146 LBS | HEIGHT: 62 IN | DIASTOLIC BLOOD PRESSURE: 64 MMHG

## 2023-10-03 VITALS
BODY MASS INDEX: 26.9 KG/M2 | SYSTOLIC BLOOD PRESSURE: 126 MMHG | OXYGEN SATURATION: 97 % | DIASTOLIC BLOOD PRESSURE: 76 MMHG | WEIGHT: 146.16 LBS | HEART RATE: 75 BPM | HEIGHT: 62 IN

## 2023-10-03 DIAGNOSIS — Z98.890 S/P SHOULDER SURGERY: Primary | ICD-10-CM

## 2023-10-03 DIAGNOSIS — M25.512 ACUTE PAIN OF LEFT SHOULDER: ICD-10-CM

## 2023-10-03 DIAGNOSIS — Z47.89 AFTERCARE FOLLOWING SURGERY OF THE MUSCULOSKELETAL SYSTEM: Primary | ICD-10-CM

## 2023-10-03 DIAGNOSIS — R00.2 PALPITATIONS: Primary | ICD-10-CM

## 2023-10-03 DIAGNOSIS — M75.102 TEAR OF LEFT ROTATOR CUFF, UNSPECIFIED TEAR EXTENT, UNSPECIFIED WHETHER TRAUMATIC: ICD-10-CM

## 2023-10-03 DIAGNOSIS — R00.2 PALPITATIONS: ICD-10-CM

## 2023-10-03 DIAGNOSIS — H65.04 RECURRENT ACUTE SEROUS OTITIS MEDIA OF RIGHT EAR: Primary | ICD-10-CM

## 2023-10-03 DIAGNOSIS — H60.391 OTHER INFECTIVE ACUTE OTITIS EXTERNA OF RIGHT EAR: ICD-10-CM

## 2023-10-03 DIAGNOSIS — M25.612 STIFFNESS OF LEFT SHOULDER JOINT: ICD-10-CM

## 2023-10-03 LAB
ANION GAP SERPL CALCULATED.3IONS-SCNC: 11.1 MMOL/L (ref 5–15)
BACTERIA UR QL AUTO: ABNORMAL /HPF
BILIRUB UR QL STRIP: NEGATIVE
BUN SERPL-MCNC: 15 MG/DL (ref 6–20)
BUN/CREAT SERPL: 20.8 (ref 7–25)
CALCIUM SPEC-SCNC: 9.5 MG/DL (ref 8.6–10.5)
CHLORIDE SERPL-SCNC: 109 MMOL/L (ref 98–107)
CLARITY UR: ABNORMAL
CO2 SERPL-SCNC: 18.9 MMOL/L (ref 22–29)
COLOR UR: YELLOW
CREAT SERPL-MCNC: 0.72 MG/DL (ref 0.57–1)
EGFRCR SERPLBLD CKD-EPI 2021: 107.2 ML/MIN/1.73
GLUCOSE SERPL-MCNC: 69 MG/DL (ref 65–99)
GLUCOSE UR STRIP-MCNC: NEGATIVE MG/DL
HGB UR QL STRIP.AUTO: ABNORMAL
HOLD SPECIMEN: NORMAL
HYALINE CASTS UR QL AUTO: ABNORMAL /LPF
KETONES UR QL STRIP: NEGATIVE
LEUKOCYTE ESTERASE UR QL STRIP.AUTO: NEGATIVE
MAGNESIUM SERPL-MCNC: 2.4 MG/DL (ref 1.6–2.6)
NITRITE UR QL STRIP: NEGATIVE
PH UR STRIP.AUTO: 5.5 [PH] (ref 5–8)
POTASSIUM SERPL-SCNC: 3.8 MMOL/L (ref 3.5–5.2)
PROT UR QL STRIP: ABNORMAL
RBC # UR STRIP: ABNORMAL /HPF
REF LAB TEST METHOD: ABNORMAL
SODIUM SERPL-SCNC: 139 MMOL/L (ref 136–145)
SP GR UR STRIP: 1.02 (ref 1–1.03)
SQUAMOUS #/AREA URNS HPF: ABNORMAL /HPF
TSH SERPL DL<=0.05 MIU/L-ACNC: 1.49 UIU/ML (ref 0.27–4.2)
UROBILINOGEN UR QL STRIP: ABNORMAL
WBC # UR STRIP: ABNORMAL /HPF

## 2023-10-03 PROCEDURE — 80048 BASIC METABOLIC PNL TOTAL CA: CPT | Performed by: FAMILY MEDICINE

## 2023-10-03 PROCEDURE — 87086 URINE CULTURE/COLONY COUNT: CPT | Performed by: ORTHOPAEDIC SURGERY

## 2023-10-03 PROCEDURE — 81001 URINALYSIS AUTO W/SCOPE: CPT | Performed by: ORTHOPAEDIC SURGERY

## 2023-10-03 PROCEDURE — 84443 ASSAY THYROID STIM HORMONE: CPT | Performed by: FAMILY MEDICINE

## 2023-10-03 PROCEDURE — 83735 ASSAY OF MAGNESIUM: CPT | Performed by: FAMILY MEDICINE

## 2023-10-03 RX ORDER — NEOMYCIN SULFATE, POLYMYXIN B SULFATE, HYDROCORTISONE 3.5; 10000; 1 MG/ML; [USP'U]/ML; MG/ML
4 SOLUTION/ DROPS AURICULAR (OTIC) 4 TIMES DAILY
Qty: 10 ML | Refills: 0 | Status: SHIPPED | OUTPATIENT
Start: 2023-10-03 | End: 2023-10-06 | Stop reason: SINTOL

## 2023-10-03 RX ORDER — AZITHROMYCIN 250 MG/1
TABLET, FILM COATED ORAL
Qty: 6 TABLET | Refills: 0 | Status: SHIPPED | OUTPATIENT
Start: 2023-10-03

## 2023-10-03 NOTE — TELEPHONE ENCOUNTER
"HUB AGENT ATTEMPTED CLINICAL WARM TRANSFER - NO ANSWER     PATIENT HAS QUESTIONS re: WORK EXCUSE WRITTEN TODAY TUES 10-03-23 WITH DIAGNOSIS OF \"LEFT SHOULDER MANIPULATION\" - PATIENT THINKS HER EMPLOYER WILL NEED ADDITIONAL INFO re: PATIENT's SURGERY TO REPAIR HER LEFT BICEP     PLEASE CALL / LEAVE VMAIL TO DISCUSS - THANKS   "

## 2023-10-03 NOTE — PROGRESS NOTES
Occupational Therapy Daily Treatment Note  [address]   [place]  [address]      Patient: Laxmi Jc   : 1980  Referring practitioner: Jv Norton MD  Date of Initial Visit: Type: THERAPY  Noted: 2023  Today's Date: 10/3/2023  Patient seen for 16 sessions           Subjective       Objective   See Exercise, Manual, and Modality Logs for complete treatment.       Assessment & Plan       Assessment  Assessment details: Good tolerance to exercises.  Decreased complaints of shooting pains up the arm during exercise. Continue with plan of care.      Visit Diagnoses:    ICD-10-CM ICD-9-CM   1. S/P shoulder surgery  Z98.890 V45.89   2. Acute pain of left shoulder  M25.512 719.41   3. Stiffness of left shoulder joint  M25.612 719.51       Progress per Plan of Care           Timed:  Manual Therapy:                 10     mins  04351  Therapeutic Exercise:      20     mins  47194     Neuromuscular reeducation       0     mins 75518  Therapeutic Activity              8     mins  84220  Ultrasound:                  0     mins  92619     Untimed:  Electrical Stimulation:    0     mins  99119 ( );  Fluidotherapy      0     mins  04684    Timed Treatment:   38   mins   Total Treatment:     38   mins    Vargas Campo OT, CHT  Occupational Therapist    Electronically signed      [place] license #: [id number]

## 2023-10-03 NOTE — PROGRESS NOTES
"Chief Complaint  Follow-up of the Left Shoulder    Subjective          History of Present Illness      Laxmi Jc is a 42 y.o. female  presents to [facility] for     Patient presents for follow-up evaluation of left shoulder manipulation with arthroscopic rotator cuff debridement, labral debridement and subpectoral bicep tenodesis, 2023.  Patient states she is recovering with help from physical therapy she goes twice a week.  She states she still has some pain in her bicep she has been reducing her pain medication dosing and is mainly taking ibuprofen and Tylenol.  She states that she still has some stiffness to the shoulder she is working with therapy for this.  She points to the bicep region as an area of pain she describes it as a nerve type pain.  She states the incisions have healed well.      Allergies   Allergen Reactions    Floxin Otic [Ofloxacin] Other (See Comments)     Swelling of ear canal     Penicillins Rash    Sulfa Antibiotics Rash    Sulfamethoxazole-Trimethoprim Rash        Social History     Socioeconomic History    Marital status:    Tobacco Use    Smoking status: Former     Packs/day: 1.50     Years: 5.00     Pack years: 7.50     Types: Cigarettes     Start date:      Quit date:      Years since quittin.7    Smokeless tobacco: Never    Tobacco comments:     Has smoked this 23   Vaping Use    Vaping Use: Never used   Substance and Sexual Activity    Alcohol use: Never    Drug use: Never    Sexual activity: Defer        REVIEW OF SYSTEMS    Constitutional: Denies fevers, chills, weight loss  Cardiovascular: Denies chest pain, shortness of breath  Skin: Denies rashes, acute skin changes  Neurologic: Denies headache, loss of consciousness  MSK: Left shoulder pain      Objective   Vital Signs:   /76   Pulse 75   Ht 157.5 cm (62\")   Wt 66.3 kg (146 lb 2.6 oz)   SpO2 97%   BMI 26.73 kg/m²     Body mass index is 26.73 " kg/m².    Physical Exam         Left shoulder: Incisions are well-healed, no erythema, no ecchymosis or swelling, no drainage or effusion, no signs of infection, active forward elevation 140 active abduction 90, external rotation with abduction 65 internal rotation to T12, strength appropriate, neurovascular intact, nontender to palpation        Procedures    Imaging Results (Most Recent)       None             Result Review :   The following data was reviewed by: LORAINE Mcintosh on 10/03/2023:               Assessment and Plan    Diagnoses and all orders for this visit:    1. Aftercare following surgery of LEFT SHOULDER MANIPULATION ARTHROSCOPIC ROTATOR CUFF DEBRIDEMENT AND SUBACROMIAL DECOMPRESSION labral debridement, manipulation, subpectoral biceps tenodesis 8/21/23 (Primary)        Discussed diagnosis and treatment options with the patient she was advised to continue physical therapy for strength/range of motion, continue to work from home with light duty follow-up in 6 weeks for recheck    Call or return if worsening symptoms.    Follow Up   Return in about 6 weeks (around 11/14/2023) for Recheck.  Patient was given instructions and counseling regarding her condition or for health maintenance advice. Please see specific information pulled into the AVS if appropriate.

## 2023-10-03 NOTE — PROGRESS NOTES
Venipuncture Blood Specimen Collection  Venipuncture performed in left arm  by Tiffanie Lynne with good hemostasis. Patient tolerated the procedure well without complications.   10/03/23   Tiffanie Lynne

## 2023-10-03 NOTE — PROGRESS NOTES
Chief Complaint  Earache (Right earache. )    Subjective            Laxmi Jc presents to [facility]  History of Present Illness  Patient is here today for an acute visit for right ear pain--and reports the last time had this was mid-August and then completed the Zapk and the immediate improvement and then the ear stopped running--and then the pt also reports was significantly improved and then had surgery  (ortho) and then recently starting to have the same s/s of the right ear and tenderness and then aching as well     PHQ-2 Total Score:    PHQ-9 Total Score:      Past Medical History:   Diagnosis Date    Adhesive capsulitis of left shoulder     Anxiety     HX OF NO CURRENT ISSUES AND NOT ON ANY MEDICATIONS    Low back pain     HAS HAD 1 EPISODE OF LOWER BACK PAIN HAD SPRAINED IT. NO CURRENT ISSUES    Migraines     Palpitations     DENIES CP/SOA. REPORTS STARTED POST COVID.  FOLLOWED BY DR GUADARRAMA YEARLY. ACTIVE    Pituitary adenoma     FOLLOWED BY DR ESTEBAN NEUROLOGY    Polymenorrhea     PT REPORTS HAS PERIODS ABOUT EVERY 2 WEEKS    Scleroderma     nonreactive    Tear of left rotator cuff        Allergies   Allergen Reactions    Floxin Otic [Ofloxacin] Other (See Comments)     Swelling of ear canal     Penicillins Rash    Sulfa Antibiotics Rash    Sulfamethoxazole-Trimethoprim Rash        Past Surgical History:   Procedure Laterality Date    BREAST LUMPECTOMY Left     FAT GRAFTING Left     repair of lumpectomy    KIDNEY STONE SURGERY      OVARIAN CYST REMOVAL      SHOULDER ARTHROSCOPY W/ ROTATOR CUFF REPAIR Left 8/21/2023    Procedure: LEFT SHOULDER MANIPULATION,  ARTHROSCOPY ROTATOR CUFF DEBRIDEMENT AND SUBACROMIAL DECOMPRESSION , labral debridement, subpectoral biceps tenodesis;  Surgeon: Jv Norton MD;  Location: [facility];  Service: Orthopedics;  Laterality: Left;        Social History     Tobacco Use    Smoking status: Former     Packs/day: 1.50     Years:  5.00     Pack years: 7.50     Types: Cigarettes     Start date:      Quit date:      Years since quittin.7    Smokeless tobacco: Never    Tobacco comments:     Has smoked this 23   Vaping Use    Vaping Use: Never used   Substance Use Topics    Alcohol use: Never    Drug use: Never       Family History   Problem Relation Age of Onset    Cancer Mother         Colon    Kidney disease Mother     Hepatitis Mother     Autoimmune disease Mother     Bleeding Disorder Mother     Hyperlipidemia Father     Hypertension Father     Diabetes Father     Stroke Father     Cancer Maternal Grandmother         Stomach    Malig Hyperthermia Neg Hx         Health Maintenance Due   Topic Date Due    COVID-19 Vaccine (1) Never done    ANNUAL PHYSICAL  Never done    PAP SMEAR  Never done    INFLUENZA VACCINE  2023        Current Outpatient Medications on File Prior to Visit   Medication Sig    ibuprofen (ADVIL,MOTRIN) 800 MG tablet Take 1 tablet by mouth Every 8 (Eight) Hours As Needed for Mild Pain.    metoprolol succinate XL (TOPROL-XL) 25 MG 24 hr tablet Take 1 tablet by mouth Daily. (Patient taking differently: Take 1 tablet by mouth Every Night. Takes for palpitations no htn)    naloxone (NARCAN) 4 MG/0.1ML nasal spray Call 911. Don't prime. Hardin in 1 nostril for overdose. Repeat in 2-3 minutes in other nostril if no or minimal breathing/responsiveness.    Norditropin FlexPro 5 MG/1.5ML solution pen-injector Inject 0.25 mg under the skin into the appropriate area as directed. REPORTS THAT TAKES NORMALLY MONDAY THROUGH FRIDAY BUT HER LAST DOSE WAS OVER 2 WEEKS STOPPED ON OWN DIDN'T KNOW IF WOULD INTERFERE WITH HER SURGERY    oxyCODONE-acetaminophen (PERCOCET) 7.5-325 MG per tablet Take 1-2 tablets by mouth Every 6 (Six) Hours As Needed for Moderate Pain.    topiramate (TOPAMAX) 100 MG tablet Take 1 tablet by mouth 2 (Two) Times a Day.     No current facility-administered medications on file prior to visit.  "        There is no immunization history on file for this patient.    Review of Systems   Constitutional:  Negative for chills and fever.   HENT:  Positive for ear pain. Negative for congestion and sore throat.         Just started with pain to the right tonsillar area as well    Respiratory:  Negative for cough.    Cardiovascular:  Negative for chest pain.   Musculoskeletal:  Negative for arthralgias.   Neurological:  Negative for headache.      Objective     /64 (BP Location: Right arm, Patient Position: Sitting, Cuff Size: Adult)   Pulse 80   Temp 97.6 °F (36.4 °C) (Temporal)   Ht 157.5 cm (62\")   Wt 66.2 kg (146 lb)   SpO2 98%   BMI 26.70 kg/m²       Physical Exam  Vitals and nursing note reviewed.   Constitutional:       Appearance: Normal appearance.   HENT:      Head: Normocephalic.      Right Ear: External ear normal. Swelling and tenderness present. A middle ear effusion is present. Tympanic membrane is injected and erythematous.      Left Ear: Tympanic membrane, ear canal and external ear normal.      Nose: Nose normal.      Mouth/Throat:      Mouth: Mucous membranes are moist.   Eyes:      Pupils: Pupils are equal, round, and reactive to light.   Cardiovascular:      Rate and Rhythm: Normal rate and regular rhythm.      Heart sounds: Normal heart sounds.   Pulmonary:      Effort: Pulmonary effort is normal.      Breath sounds: Normal breath sounds.   Musculoskeletal:         General: Normal range of motion.      Cervical back: Normal range of motion and neck supple.   Skin:     General: Skin is warm and dry.   Neurological:      Mental Status: She is alert and oriented to person, place, and time.   Psychiatric:         Mood and Affect: Mood normal.         Behavior: Behavior normal.         Thought Content: Thought content normal.         Judgment: Judgment normal.       Result Review :                      Admission (Discharged) with Jv Norton MD (08/21/2023)      Assessment and " Plan      Diagnoses and all orders for this visit:    1. Recurrent acute serous otitis media of right ear (Primary)  -     azithromycin (Zithromax Z-Sonny) 250 MG tablet; Take 2 tablets by mouth on day 1, then 1 tablet daily on days 2-5  Dispense: 6 tablet; Refill: 0  -     neomycin-polymyxin-hydrocortisone (CORTISPORIN) 1 % solution otic solution; Administer 4 drops to the right ear 4 (Four) Times a Day.  Dispense: 10 mL; Refill: 0    2. Other infective acute otitis externa of right ear  -     azithromycin (Zithromax Z-Sonny) 250 MG tablet; Take 2 tablets by mouth on day 1, then 1 tablet daily on days 2-5  Dispense: 6 tablet; Refill: 0  -     neomycin-polymyxin-hydrocortisone (CORTISPORIN) 1 % solution otic solution; Administer 4 drops to the right ear 4 (Four) Times a Day.  Dispense: 10 mL; Refill: 0            Follow Up     Return if symptoms worsen or fail to improve.    Patient was given instructions and counseling regarding her condition or for health maintenance advice. Please see specific information pulled into the AVS if appropriate.            Laxmi Jc  reports that she quit smoking about 9 months ago. Her smoking use included cigarettes. She started smoking about 5 years ago. She has a 7.50 pack-year smoking history. She has never used smokeless tobacco.

## 2023-10-05 ENCOUNTER — TELEPHONE (OUTPATIENT)
Dept: CARDIOLOGY | Facility: CLINIC | Age: 43
End: 2023-10-05
Payer: OTHER GOVERNMENT

## 2023-10-05 LAB — BACTERIA SPEC AEROBE CULT: NO GROWTH

## 2023-10-05 NOTE — TELEPHONE ENCOUNTER
----- Message from ISAAC Jean sent at 10/5/2023 10:48 AM EDT -----  Lab work shows her electrolytes, renal function, magnesium and thyroid levels all been normal.  From a cardiac standpoint, no treatment changes based on these labs.

## 2023-10-06 ENCOUNTER — OFFICE VISIT (OUTPATIENT)
Dept: FAMILY MEDICINE CLINIC | Facility: CLINIC | Age: 43
End: 2023-10-06
Payer: OTHER GOVERNMENT

## 2023-10-06 VITALS
WEIGHT: 146.6 LBS | DIASTOLIC BLOOD PRESSURE: 68 MMHG | BODY MASS INDEX: 26.81 KG/M2 | HEART RATE: 84 BPM | OXYGEN SATURATION: 100 % | TEMPERATURE: 98.2 F | SYSTOLIC BLOOD PRESSURE: 100 MMHG

## 2023-10-06 DIAGNOSIS — J30.9 ALLERGIC RHINITIS, UNSPECIFIED SEASONALITY, UNSPECIFIED TRIGGER: ICD-10-CM

## 2023-10-06 DIAGNOSIS — H66.3X1 CHRONIC SUPPURATIVE OTITIS MEDIA OF RIGHT EAR, UNSPECIFIED OTITIS MEDIA LOCATION: Primary | ICD-10-CM

## 2023-10-06 RX ORDER — CETIRIZINE HYDROCHLORIDE 10 MG/1
10 TABLET ORAL DAILY
Qty: 90 TABLET | Refills: 3 | Status: SHIPPED | OUTPATIENT
Start: 2023-10-06

## 2023-10-06 RX ORDER — PREDNISONE 20 MG/1
20 TABLET ORAL DAILY
Qty: 5 TABLET | Refills: 0 | Status: SHIPPED | OUTPATIENT
Start: 2023-10-06 | End: 2023-10-11

## 2023-10-06 NOTE — PROGRESS NOTES
Chief Complaint  Earache (Right eye pain and swelling)    Subjective      History of Present Illness  Laxmi Jc is a 42 y.o. female who presents to [facility] with a past medical history of  Past Medical History:   Diagnosis Date    Adhesive capsulitis of left shoulder     Anxiety     HX OF NO CURRENT ISSUES AND NOT ON ANY MEDICATIONS    Low back pain     HAS HAD 1 EPISODE OF LOWER BACK PAIN HAD SPRAINED IT. NO CURRENT ISSUES    Migraines     Palpitations     DENIES CP/SOA. REPORTS STARTED POST COVID.  FOLLOWED BY DR GUADARRAMA YEARLY. ACTIVE    Pituitary adenoma     FOLLOWED BY DR ESTEBAN NEUROLOGY    Polymenorrhea     PT REPORTS HAS PERIODS ABOUT EVERY 2 WEEKS    Scleroderma     nonreactive    Tear of left rotator cuff      She was here on 10/3/2023 with right ear pain. She was diagnosed with otitis externa and otitis media of right ear and given azithromycin and cortisporin eardrops. Noted allergy to several antibiotics along with floxin otic (ofloxacin). She has gotten otitis externa and otitis media several times.    Today, patient reports that these eardrops caused ear swelling and she had to take some benadryl and it is still swollen. The azithromycin has been okay. Still feels fluid in her ears and ear popping. No fever or chills. No sinus symptoms. No jaw pain. No dyspnea or cough. A little muffled hearing and when it gets really bad she can't hear much out of the right ear. No problems with the left ear.    Used to leak fluid out of the ear back in August. Used to be all day, every day. After the initial z-pack a couple months ago those symptoms stopped. She last saw ENT a while ago and wants a referral to a different ENT if possible to get a second opinion.    Objective   Vital Signs:   Vitals:    10/06/23 1014   BP: 100/68   Pulse: 84   Temp: 98.2 °F (36.8 °C)   SpO2: 100%   Weight: 66.5 kg (146 lb 9.6 oz)       Wt Readings from Last 3 Encounters:   10/06/23 66.5 kg (146  lb 9.6 oz)   10/03/23 66.3 kg (146 lb 2.6 oz)   10/03/23 66.2 kg (146 lb)     BP Readings from Last 3 Encounters:   10/06/23 100/68   10/03/23 126/76   10/03/23 118/64         Physical Exam  Vitals and nursing note reviewed.   Constitutional:       General: She is not in acute distress.     Appearance: Normal appearance.   HENT:      Head: Normocephalic.      Right Ear: Ear canal and external ear normal. There is no impacted cerumen.      Left Ear: Tympanic membrane, ear canal and external ear normal. There is no impacted cerumen.      Ears:      Comments: Right tympanic membrane slightly erythematous and bulging.     Nose: No congestion.   Eyes:      Extraocular Movements: Extraocular movements intact.      Conjunctiva/sclera: Conjunctivae normal.   Cardiovascular:      Rate and Rhythm: Normal rate and regular rhythm.   Pulmonary:      Effort: Pulmonary effort is normal. No respiratory distress.      Breath sounds: Normal breath sounds. No wheezing.   Skin:     General: Skin is warm and dry.   Neurological:      Mental Status: She is alert.   Psychiatric:         Mood and Affect: Mood normal.         Behavior: Behavior normal.          Result Review :  The following data was reviewed by: Yousuf Gomez MD on 10/06/2023:      Procedures        Assessment and Plan   Diagnoses and all orders for this visit:    1. Chronic suppurative otitis media of right ear, unspecified otitis media location (Primary)  -     predniSONE (DELTASONE) 20 MG tablet; Take 1 tablet by mouth Daily for 5 days.  Dispense: 5 tablet; Refill: 0  -     cetirizine (zyrTEC) 10 MG tablet; Take 1 tablet by mouth Daily.  Dispense: 90 tablet; Refill: 3  -     Ambulatory Referral to ENT (Otolaryngology)    2. Allergic rhinitis, unspecified seasonality, unspecified trigger  -     cetirizine (zyrTEC) 10 MG tablet; Take 1 tablet by mouth Daily.  Dispense: 90 tablet; Refill: 3      Based on patient's symptoms and her history, it seems like she has had  chronic suppurative otitis media.  She is only having a regular drainage from her ear which is good, but recurrent ear infections are an unusual finding for an adult.  It seems like the azithromycin is working well to treat her current ear infection so encouraged her to complete this course.  I am also starting her on cetirizine and a short course of prednisone to help the symptoms and hopefully reduce the pressure she is having in that ear.  She was interested in seeing a new ENT doctor for a second opinion and so I placed that referral as well.      I do not see any signs of otitis externa on exam today.  She had a bad reaction to multiple different eardrops and otitis externa was noted on her last exam a few days ago, but it seems like those symptoms have resolved and she is mainly dealing with an otitis media infection currently.  We discussed potentially doing steroid eardrops as I assume it was the antibiotic portion of the eardrops that she had a poor reaction to, but in the interest of avoiding any further irritation to her ears we decided to do a short course of systemic steroids instead.    If she has some benefit with the Zyrtec, would always try Flonase to see if that helps with the idea of preventing sinus fluid buildup which backs up behind your ears.  She is not really having any sinus symptoms currently so we decided to not start Flonase at this time.      FOLLOW UP  Return if symptoms worsen or fail to improve.  Patient was given instructions and counseling regarding her condition or for health maintenance advice. Please see specific information pulled into the AVS if appropriate.      41 minutes were spent caring for Laxmi on this date of service. This time spent by me includes preparing for the visit, reviewing tests, obtaining/reviewing separately obtained history, performing medically appropriate exam/evaluation, counseling/educating the patient/family/caregiver, ordering  medications/tests/procedures, referring/communicating with other health care professionals, documenting information in the medical record, independently interpreting results and communicating that with the patient/family/caregiver and/or care coordination.       Yousuf Gomez MD  10/06/23  11:25 EDT    CURRENT & DISCONTINUED MEDICATIONS  Current Outpatient Medications   Medication Instructions    azithromycin (Zithromax Z-Sonny) 250 MG tablet Take 2 tablets by mouth on day 1, then 1 tablet daily on days 2-5    cetirizine (ZYRTEC) 10 mg, Oral, Daily    ibuprofen (ADVIL,MOTRIN) 800 mg, Oral, Every 8 Hours PRN    metoprolol succinate XL (TOPROL-XL) 25 mg, Oral, Daily    naloxone (NARCAN) 4 MG/0.1ML nasal spray Call 911. Don't prime. Metaline in 1 nostril for overdose. Repeat in 2-3 minutes in other nostril if no or minimal breathing/responsiveness.    Norditropin FlexPro 0.25 mg, Subcutaneous, REPORTS THAT TAKES NORMALLY MONDAY THROUGH FRIDAY BUT HER LAST DOSE WAS OVER 2 WEEKS STOPPED ON OWN DIDN'T KNOW IF WOULD INTERFERE WITH HER SURGERY    oxyCODONE-acetaminophen (PERCOCET) 7.5-325 MG per tablet 1-2 tablets, Oral, Every 6 Hours PRN    predniSONE (DELTASONE) 20 mg, Oral, Daily    topiramate (TOPAMAX) 100 mg, Oral, 2 Times Daily       Medications Discontinued During This Encounter   Medication Reason    neomycin-polymyxin-hydrocortisone (CORTISPORIN) 1 % solution otic solution Side effects

## 2023-10-10 ENCOUNTER — PATIENT MESSAGE (OUTPATIENT)
Dept: FAMILY MEDICINE CLINIC | Facility: CLINIC | Age: 43
End: 2023-10-10
Payer: OTHER GOVERNMENT

## 2023-10-10 ENCOUNTER — TREATMENT (OUTPATIENT)
Dept: PHYSICAL THERAPY | Facility: CLINIC | Age: 43
End: 2023-10-10
Payer: OTHER GOVERNMENT

## 2023-10-10 DIAGNOSIS — M25.512 ACUTE PAIN OF LEFT SHOULDER: ICD-10-CM

## 2023-10-10 DIAGNOSIS — Z98.890 S/P SHOULDER SURGERY: Primary | ICD-10-CM

## 2023-10-10 DIAGNOSIS — M25.612 STIFFNESS OF LEFT SHOULDER JOINT: ICD-10-CM

## 2023-10-10 PROCEDURE — 97110 THERAPEUTIC EXERCISES: CPT | Performed by: OCCUPATIONAL THERAPIST

## 2023-10-10 PROCEDURE — 97140 MANUAL THERAPY 1/> REGIONS: CPT | Performed by: OCCUPATIONAL THERAPIST

## 2023-10-10 PROCEDURE — 97530 THERAPEUTIC ACTIVITIES: CPT | Performed by: OCCUPATIONAL THERAPIST

## 2023-10-10 NOTE — PROGRESS NOTES
Occupational Therapy Daily Treatment Note  [address], [address]   [place]  [address]      Patient: Laxmi Jc   : 1980  Referring practitioner: Jv Norton MD  Date of Initial Visit: Type: THERAPY  Noted: 2023  Today's Date: 10/10/2023  Patient seen for 17 sessions           Subjective Evaluation    History of Present Illness    Subjective comment: Patient states that she is feeling a little better and feels like she is able to raise her arm more.Pain  Current pain ratin           Objective   See Exercise, Manual, and Modality Logs for complete treatment.       Assessment & Plan       Assessment  Assessment details: Good tolerance to exercises.  Emphasis on subscapularis stretching resulted in ROM gains.  Continue with plan of care.      Visit Diagnoses:    ICD-10-CM ICD-9-CM   1. S/P shoulder surgery  Z98.890 V45.89   2. Acute pain of left shoulder  M25.512 719.41   3. Stiffness of left shoulder joint  M25.612 719.51       Progress strengthening /stabilization /functional activity           Timed:  Manual Therapy:                 10     mins  08140  Therapeutic Exercise:      20     mins  18416     Neuromuscular reeducation       0     mins 42053  Therapeutic Activity              8     mins  03709  Ultrasound:                  0     mins  72698     Untimed:  Electrical Stimulation:    0     mins  30594 ( );  Fluidotherapy      0     mins  97109    Timed Treatment:   38   mins   Total Treatment:     38   mins    Vargas Campo OT, JACIELT  Occupational Therapist    Electronically signed      [place] license #: [id number]

## 2023-10-10 NOTE — TELEPHONE ENCOUNTER
"From: Laxmi Jc  To: Misaeduardo Chavez  Sent: 10/10/2023 9:52 AM EDT  Subject: Bloodwork     Hello,   I noticed that there are several comments in "My Chart" about my kidneys in all the bloodwork and urine samples I gave on Oct 3rd. (One lab wrote - "Kidney disease/Kidney failure" levels) Should I be coming in for a follow up concerning my kidneys? I am not sure if those levels are something to worry about or not being they were abnormal, and I don't really know how to read blood work levels, so I thought I would ask to be proactive.     Thanks!    Laxmi Jc  "

## 2023-10-12 ENCOUNTER — TREATMENT (OUTPATIENT)
Dept: PHYSICAL THERAPY | Facility: CLINIC | Age: 43
End: 2023-10-12
Payer: OTHER GOVERNMENT

## 2023-10-12 DIAGNOSIS — M25.512 ACUTE PAIN OF LEFT SHOULDER: ICD-10-CM

## 2023-10-12 DIAGNOSIS — Z98.890 S/P SHOULDER SURGERY: Primary | ICD-10-CM

## 2023-10-12 DIAGNOSIS — M25.612 STIFFNESS OF LEFT SHOULDER JOINT: ICD-10-CM

## 2023-10-12 NOTE — PROGRESS NOTES
Re-Assessment / Re-Certification      Patient: Laxmi Jc   : 1980  Diagnosis/ICD-10 Code:  S/P shoulder surgery [Z98.890]  Referring practitioner: Jv Norton MD  Date of Initial Visit: Type: THERAPY  Noted: 2023  Today's Date: 10/12/2023  Patient seen for 18 sessions      Subjective:     Quick Dash 24/55 20-39% functional limitation  Clinical Progress: improved  Home Program Compliance: Yes  Treatment has included: therapeutic exercise, manual therapy, and therapeutic activity    Subjective Evaluation    History of Present Illness    Subjective comment: Patient states that she feels like she is moving her arm better with increased ROMPain  Current pain rating: 3       Objective          Observations     Additional Shoulder Observation Details  Well healing incision to L shoulder.    Neurological Testing     Additional Neurological Details  Pt reports numbness along medial elbow.    Active Range of Motion   Left Shoulder   Flexion: 140 degrees   Abduction: 140 degrees   External rotation BTH: C7   Internal rotation BTB: lumbar     Additional Active Range of Motion Details  Pt unable to actively move L UE secondary to nerve block.    Passive Range of Motion     Right Shoulder   Flexion: 145 degrees   Abduction: 145 degrees   External rotation 45ø: 45 degrees with pain  Internal rotation 45ø: 40 degrees with pain    Strength/Myotome Testing     Left Shoulder     Planes of Motion   Flexion: 4   Abduction: 4   External rotation at 45ø: 4   Internal rotation at 45ø: 4     Isolated Muscles   Biceps: 4+   Triceps: 4     Additional Strength Details  Deferred at this time.      Assessment & Plan       Assessment  Impairments: abnormal or restricted ROM, activity intolerance, impaired physical strength, lacks appropriate home exercise program and pain with function   Functional limitations: carrying objects, lifting, sleeping, pulling, pushing, uncomfortable because of pain, moving in bed, reaching  behind back and reaching overhead   Prognosis: good    Goals  Plan Goals: SHOULDER  PROBLEMS:     1. The patient has limited ROM of the L shoulder.    LTG 1: 12 weeks:  The patient will demonstrate 140 degrees of active shoulder flexion, 140 degrees of shoulder abduction, external rotation to behind head, and internal rotation to lumbar spine to allow the patient to reach into upper kitchen cabinets and manipulate clothing behind the back with greater ease.    STATUS:  Not met   STG 1a: 8 weeks:  The patient will demonstrate 140 degrees of passive shoulder flexion, 140 degrees of passive shoulder abduction, 40 degrees of passive shoulder external rotation, and 40 degrees of passive shoulder internal rotation.    STATUS:  Met   TREATMENT: Manual therapy, therapeutic exercise, home exercise instruction, and modalities as needed to include: electrical stimulation, ultrasound, moist heat, and ice.    2. The patient has limited strength of the L shoulder.   LTG 2: 12 weeks:  The patient will demonstrate 5 /5 strength for L shoulder flexion, abduction, external rotation, and internal rotation in order to demonstrate improved shoulder stability.    STATUS:  Not met   STG 2a: 8 weeks:  The patient will demonstrate ability to tolerate MMT for L shoulder flexion, abduction, external rotation, and internal rotation.    STATUS:  Met   STG2b:  8 weeks:  The patient will be independent with home exercises.     STATUS:  Met   TREATMENT: Manual therapy, therapeutic exercise, home exercise instruction, and modalities as needed to include: electrical stimulation, ultrasound, moist heat, and ice.     3. The patient complains of pain to the L shoulder.   LTG 3: 12 weeks:  The patient will report a pain rating of 1 /10 or better in order to improve sleep quality and tolerance to performance of activities of daily living.    STATUS:  Not met   STG 3a: 8 weeks:  The patient will report a pain rating of 3 /10 or better.     STATUS:   Met   TREATMENT: Manual therapy, therapeutic exercise, home exercise instruction, and modalities as needed to include: electrical stimulation, ultrasound, moist heat, and ice.    4. Carrying, Moving, and Handling Objects Functional Limitation     LTG 4: 12 weeks:  The patient will demonstrate 1-19 % limitation by achieving a score of 19 on the QuickDASH.    STATUS:  Not met   STG 4a: 8 weeks:  The patient will demonstrate 20-39 % limitation by achieving a score of 27 on the QuickDASH.      STATUS:  Met   TREATMENT:  Manual therapy, therapeutic exercise, home exercise instruction, and modalities as needed to include: moist heat, electrical stimulation, and ultrasound.     Plan  Planned modality interventions: cryotherapy and thermotherapy (hydrocollator packs)  Planned therapy interventions: body mechanics training, fine motor coordination training, flexibility, functional ROM exercises, home exercise program, joint mobilization, manual therapy, neuromuscular re-education, postural training, soft tissue mobilization, strengthening, stretching and therapeutic activities  Frequency: 2x week  Duration in weeks: 4  Treatment plan discussed with: patient  Plan details: Continue with plan of care with emphasis on increasing ROM and strength.        Visit Diagnoses:    ICD-10-CM ICD-9-CM   1. S/P shoulder surgery  Z98.890 V45.89   2. Acute pain of left shoulder  M25.512 719.41   3. Stiffness of left shoulder joint  M25.612 719.51       Progress toward previous goals: Partially Met       Recommendations: Continue as planned  Timeframe: 1 month  Prognosis to achieve goals: good    OT Signature: Vargas Campo OT, ALLYSON    Electronically signed    [place] license #: [id number]    Based upon review of the patient's progress and continued therapy plan, it is my medical opinion that Laxmi Jc should continue physical therapy treatment at [facility]  [address] [place] [address]  [address]  [phone].    Signature: __________________________________  Jv Norton MD  NPI:     Timed:  Manual Therapy:                 10     mins  89611  Therapeutic Exercise:      20     mins  73155     Neuromuscular reeducation       0     mins 55887  Therapeutic Activity              8     mins  62294  Ultrasound:                  0     mins  44701      Untimed:  Electrical Stimulation:    0     mins  03895 ( )  Fluidotherapy     0     mins  53528    Timed Treatment:   38   mins   Total Treatment:     38   mins

## 2023-10-17 ENCOUNTER — TREATMENT (OUTPATIENT)
Dept: PHYSICAL THERAPY | Facility: CLINIC | Age: 43
End: 2023-10-17
Payer: OTHER GOVERNMENT

## 2023-10-17 DIAGNOSIS — R29.898 WEAKNESS OF LEFT UPPER EXTREMITY: ICD-10-CM

## 2023-10-17 DIAGNOSIS — M25.512 ACUTE PAIN OF LEFT SHOULDER: ICD-10-CM

## 2023-10-17 DIAGNOSIS — M25.612 STIFFNESS OF LEFT SHOULDER JOINT: ICD-10-CM

## 2023-10-17 DIAGNOSIS — Z98.890 STATUS POST DECOMPRESSION OF SUBACROMIAL SPACE: ICD-10-CM

## 2023-10-17 DIAGNOSIS — Z98.890 S/P SHOULDER SURGERY: Primary | ICD-10-CM

## 2023-10-17 PROCEDURE — 97140 MANUAL THERAPY 1/> REGIONS: CPT | Performed by: OCCUPATIONAL THERAPIST

## 2023-10-17 PROCEDURE — 97110 THERAPEUTIC EXERCISES: CPT | Performed by: OCCUPATIONAL THERAPIST

## 2023-10-17 PROCEDURE — 97530 THERAPEUTIC ACTIVITIES: CPT | Performed by: OCCUPATIONAL THERAPIST

## 2023-10-17 NOTE — PROGRESS NOTES
Occupational Therapy Daily Treatment Note  [place]: [address] [place] [address]      Patient: Laxmi Jc   : 1980  Diagnosis/ICD-10 Code:  S/P shoulder surgery [Z98.890]  Referring practitioner: Jv Norton MD  Date of Initial Visit: Type: THERAPY  Noted: 2023  Today's Date: 10/17/2023  Patient seen for 19 sessions             Subjective   Laxmi Jc reports: Shoulder is doing well. 1-2/10 pain L shoulder.    Objective     See Exercise, Manual, and Modality Logs for complete treatment.       Assessment/Plan  Pt tolerated well this date with no c/o increased pain.  Progress per Plan of Care           Timed:  Manual Therapy:    10     mins  32997;  Therapeutic Exercise:    20     mins  42167;       Therapeutic Activity:     8     mins  11286;       Timed Treatment:   38   mins   Total Treatment:     38   mins        Yousuf Lynne OT  Occupational Therapist  [place] License: [id number]  NPI: [id number]

## 2023-10-20 ENCOUNTER — TREATMENT (OUTPATIENT)
Dept: PHYSICAL THERAPY | Facility: CLINIC | Age: 43
End: 2023-10-20
Payer: OTHER GOVERNMENT

## 2023-10-20 DIAGNOSIS — Z98.890 S/P SHOULDER SURGERY: Primary | ICD-10-CM

## 2023-10-20 DIAGNOSIS — M25.512 ACUTE PAIN OF LEFT SHOULDER: ICD-10-CM

## 2023-10-20 DIAGNOSIS — M25.612 STIFFNESS OF LEFT SHOULDER JOINT: ICD-10-CM

## 2023-10-20 NOTE — PROGRESS NOTES
Occupational Therapy Daily Treatment Note  [address]   [place]  [address]      Patient: Laxmi Jc   : 1980  Referring practitioner: Jv Norton MD  Date of Initial Visit: Type: THERAPY  Noted: 2023  Today's Date: 10/20/2023  Patient seen for 20 sessions           Subjective Evaluation    History of Present Illness    Subjective comment: Patient reports having minimal pain today.Pain  Current pain ratin             Objective   See Exercise, Manual, and Modality Logs for complete treatment.       Assessment & Plan       Assessment  Assessment details: Exercises well tolerated.  Continue with plan of care.        Visit Diagnoses:    ICD-10-CM ICD-9-CM   1. S/P shoulder surgery  Z98.890 V45.89   2. Acute pain of left shoulder  M25.512 719.41   3. Stiffness of left shoulder joint  M25.612 719.51       Progress strengthening /stabilization /functional activity           Timed:  Manual Therapy:                 10     mins  15379  Therapeutic Exercise:      20     mins  77076     Neuromuscular reeducation       0     mins 76878  Therapeutic Activity              8     mins  78516  Ultrasound:                  0     mins  70260     Untimed:  Electrical Stimulation:    0     mins  35883 ( );  Fluidotherapy      0     mins  78879    Timed Treatment:   38   mins   Total Treatment:     38   mins    aVrgas Campo OT, CHT  Occupational Therapist    Electronically signed      [place] license #: [id number]

## 2023-10-24 ENCOUNTER — TREATMENT (OUTPATIENT)
Dept: PHYSICAL THERAPY | Facility: CLINIC | Age: 43
End: 2023-10-24
Payer: OTHER GOVERNMENT

## 2023-10-24 DIAGNOSIS — M25.512 ACUTE PAIN OF LEFT SHOULDER: ICD-10-CM

## 2023-10-24 DIAGNOSIS — R29.898 WEAKNESS OF LEFT UPPER EXTREMITY: ICD-10-CM

## 2023-10-24 DIAGNOSIS — M25.612 STIFFNESS OF LEFT SHOULDER JOINT: ICD-10-CM

## 2023-10-24 DIAGNOSIS — Z98.890 S/P SHOULDER SURGERY: Primary | ICD-10-CM

## 2023-10-24 DIAGNOSIS — Z98.890 STATUS POST DECOMPRESSION OF SUBACROMIAL SPACE: ICD-10-CM

## 2023-10-24 PROCEDURE — 97140 MANUAL THERAPY 1/> REGIONS: CPT | Performed by: OCCUPATIONAL THERAPIST

## 2023-10-24 PROCEDURE — 97530 THERAPEUTIC ACTIVITIES: CPT | Performed by: OCCUPATIONAL THERAPIST

## 2023-10-24 NOTE — PROGRESS NOTES
"Occupational Therapy Daily Treatment Note  [place] OT: [address] [place] [address]      Patient: Laxmi Jc   : 1980  Diagnosis/ICD-10 Code:  S/P shoulder surgery [Z98.890]  Referring practitioner: Jv Norton MD  Date of Initial Visit: Type: THERAPY  Noted: 2023  Today's Date: 10/24/2023  Patient seen for 21 sessions             Subjective   Laxmi Jc reports: \"It's like a 1\" pain in L shoulder.    Objective     See Exercise, Manual, and Modality Logs for complete treatment.       Assessment/Plan  Pt tolerated well this date. Attempt isometrics next visit.  Progress per Plan of Care           Timed:  Manual Therapy:    8     mins  10449;  Therapeutic Exercise:    4     mins  64058;      Therapeutic Activity:     14     mins  05824;       Timed Treatment:   26   mins   Total Treatment:     26   mins        Yousfu Lynne OT  Occupational Therapist  [place] License: [id number]  NPI: [id number]  "

## 2023-10-25 DIAGNOSIS — R00.2 PALPITATIONS: ICD-10-CM

## 2023-10-25 RX ORDER — METOPROLOL SUCCINATE 25 MG/1
25 TABLET, EXTENDED RELEASE ORAL DAILY
Qty: 90 TABLET | Refills: 3 | Status: SHIPPED | OUTPATIENT
Start: 2023-10-25

## 2023-10-26 DIAGNOSIS — M75.102 TEAR OF LEFT ROTATOR CUFF, UNSPECIFIED TEAR EXTENT, UNSPECIFIED WHETHER TRAUMATIC: Primary | ICD-10-CM

## 2023-10-27 RX ORDER — IBUPROFEN 800 MG/1
800 TABLET ORAL EVERY 8 HOURS PRN
Qty: 180 TABLET | Refills: 0 | Status: SHIPPED | OUTPATIENT
Start: 2023-10-27

## 2023-10-31 ENCOUNTER — TREATMENT (OUTPATIENT)
Dept: PHYSICAL THERAPY | Facility: CLINIC | Age: 43
End: 2023-10-31
Payer: OTHER GOVERNMENT

## 2023-10-31 DIAGNOSIS — R29.898 WEAKNESS OF LEFT UPPER EXTREMITY: ICD-10-CM

## 2023-10-31 DIAGNOSIS — Z98.890 S/P SHOULDER SURGERY: Primary | ICD-10-CM

## 2023-10-31 DIAGNOSIS — Z98.890 STATUS POST DECOMPRESSION OF SUBACROMIAL SPACE: ICD-10-CM

## 2023-10-31 DIAGNOSIS — M25.512 ACUTE PAIN OF LEFT SHOULDER: ICD-10-CM

## 2023-10-31 DIAGNOSIS — M25.612 STIFFNESS OF LEFT SHOULDER JOINT: ICD-10-CM

## 2023-10-31 PROCEDURE — 97110 THERAPEUTIC EXERCISES: CPT | Performed by: OCCUPATIONAL THERAPIST

## 2023-10-31 PROCEDURE — 97530 THERAPEUTIC ACTIVITIES: CPT | Performed by: OCCUPATIONAL THERAPIST

## 2023-10-31 PROCEDURE — 97140 MANUAL THERAPY 1/> REGIONS: CPT | Performed by: OCCUPATIONAL THERAPIST

## 2023-10-31 NOTE — PROGRESS NOTES
"Occupational Therapy Daily Treatment Note  [place] OT: [address] [place] [address]      Patient: Laxmi Jc   : 1980  Diagnosis/ICD-10 Code:  S/P shoulder surgery [Z98.890]  Referring practitioner: Jv Norton MD  Date of Initial Visit: Type: THERAPY  Noted: 2023  Today's Date: 10/31/2023  Patient seen for 22 sessions             Subjective   Laxmi Jc reports: \"I've been getting a lot of popping\" 1/10 pain L shoulder.    Objective     See Exercise, Manual, and Modality Logs for complete treatment.       Assessment/Plan  OT provided SA punch HEP. Teachback successful. Pt displays improved active flexion with no compensatory movements. Pt displays good range with active abd but does display compensatory movements. IR behind back is improving as well. Pt tolerated treatment well.  Progress per Plan of Care           Timed:  Manual Therapy:    13     mins  39762;  Therapeutic Exercise:    15     mins  69936;      Therapeutic Activity:     10     mins  18842;       Timed Treatment:   38   mins   Total Treatment:     38   mins        Yousuf Lynne OT  Occupational Therapist  [place] License: [id number]  NPI: [id number]  "

## 2023-11-03 ENCOUNTER — TREATMENT (OUTPATIENT)
Dept: PHYSICAL THERAPY | Facility: CLINIC | Age: 43
End: 2023-11-03
Payer: OTHER GOVERNMENT

## 2023-11-03 DIAGNOSIS — R29.898 WEAKNESS OF LEFT UPPER EXTREMITY: ICD-10-CM

## 2023-11-03 DIAGNOSIS — M25.512 ACUTE PAIN OF LEFT SHOULDER: ICD-10-CM

## 2023-11-03 DIAGNOSIS — M25.612 STIFFNESS OF LEFT SHOULDER JOINT: ICD-10-CM

## 2023-11-03 DIAGNOSIS — Z98.890 S/P SHOULDER SURGERY: Primary | ICD-10-CM

## 2023-11-03 DIAGNOSIS — Z98.890 STATUS POST DECOMPRESSION OF SUBACROMIAL SPACE: ICD-10-CM

## 2023-11-03 NOTE — PROGRESS NOTES
Occupational Therapy Daily Treatment Note  [place]: [address] [place] [address]      Patient: Laxmi Jc   : 1980  Diagnosis/ICD-10 Code:  S/P shoulder surgery [Z98.890]  Referring practitioner: Jv Norton MD  Date of Initial Visit: Type: THERAPY  Noted: 2023  Today's Date: 11/3/2023  Patient seen for 23 sessions             Subjective   Laxmi Jc reports: Shoulder is doing well. No current pain.    Objective     See Exercise, Manual, and Modality Logs for complete treatment.       Assessment/Plan  Pt tolerated well this date. OT provided isometric strengthening HEP. Teachback successful.  Progress per Plan of Care           Timed:  Manual Therapy:    10     mins  58500;  Therapeutic Exercise:    18     mins  47950;      Therapeutic Activity:     10     mins  98884;       Timed Treatment:   38   mins   Total Treatment:     38   mins        Yousuf Lynne OT  Occupational Therapist  [place] License: [id number]  NPI: [id number]

## 2023-11-07 ENCOUNTER — TREATMENT (OUTPATIENT)
Dept: PHYSICAL THERAPY | Facility: CLINIC | Age: 43
End: 2023-11-07
Payer: OTHER GOVERNMENT

## 2023-11-07 DIAGNOSIS — Z98.890 S/P SHOULDER SURGERY: Primary | ICD-10-CM

## 2023-11-07 DIAGNOSIS — M25.512 ACUTE PAIN OF LEFT SHOULDER: ICD-10-CM

## 2023-11-07 DIAGNOSIS — M25.612 STIFFNESS OF LEFT SHOULDER JOINT: ICD-10-CM

## 2023-11-07 DIAGNOSIS — Z98.890 STATUS POST DECOMPRESSION OF SUBACROMIAL SPACE: ICD-10-CM

## 2023-11-07 DIAGNOSIS — R29.898 WEAKNESS OF LEFT UPPER EXTREMITY: ICD-10-CM

## 2023-11-07 PROCEDURE — 97530 THERAPEUTIC ACTIVITIES: CPT | Performed by: OCCUPATIONAL THERAPIST

## 2023-11-07 PROCEDURE — 97110 THERAPEUTIC EXERCISES: CPT | Performed by: OCCUPATIONAL THERAPIST

## 2023-11-07 NOTE — PROGRESS NOTES
"Occupational Therapy Daily Treatment Note  [place] OT: [address] [place] [address]      Patient: Laxmi Jc   : 1980  Diagnosis/ICD-10 Code:  S/P shoulder surgery [Z98.890]  Referring practitioner: Jv Norton MD  Date of Initial Visit: Type: THERAPY  Noted: 2023  Today's Date: 2023  Patient seen for 24 sessions             Subjective   Laxmi Jc reports: No current pain. Pt reports shoulder is \"sore and tired\"    Objective     See Exercise, Manual, and Modality Logs for complete treatment.       Assessment/Plan  Pt tolerated treatment well this date.  Progress per Plan of Care           Timed:  Manual Therapy:    8     mins  21427;  Therapeutic Exercise:    9     mins  32626;     Therapeutic Activity:     8     mins  59962;       Timed Treatment:   25   mins   Total Treatment:     25   mins        Yousuf Lynne OT  Occupational Therapist  [place] License: [id number]  NPI: [id number]  "

## 2023-11-13 ENCOUNTER — TELEPHONE (OUTPATIENT)
Dept: PHYSICAL THERAPY | Facility: CLINIC | Age: 43
End: 2023-11-13

## 2023-11-13 NOTE — TELEPHONE ENCOUNTER
Caller: Laxmi Jc    Relationship: Self         What was the call regarding: HAS TO TAKE SON TO

## 2023-11-14 ENCOUNTER — OFFICE VISIT (OUTPATIENT)
Dept: ORTHOPEDIC SURGERY | Facility: CLINIC | Age: 43
End: 2023-11-14
Payer: OTHER GOVERNMENT

## 2023-11-14 VITALS
BODY MASS INDEX: 26.98 KG/M2 | DIASTOLIC BLOOD PRESSURE: 82 MMHG | OXYGEN SATURATION: 97 % | SYSTOLIC BLOOD PRESSURE: 126 MMHG | HEART RATE: 80 BPM | HEIGHT: 62 IN | WEIGHT: 146.61 LBS

## 2023-11-14 DIAGNOSIS — Z47.89 AFTERCARE FOLLOWING SURGERY OF THE MUSCULOSKELETAL SYSTEM: Primary | ICD-10-CM

## 2023-11-14 PROCEDURE — 99024 POSTOP FOLLOW-UP VISIT: CPT | Performed by: PHYSICIAN ASSISTANT

## 2023-11-14 RX ORDER — CYCLOBENZAPRINE HCL 10 MG
10 TABLET ORAL 3 TIMES DAILY PRN
Qty: 30 TABLET | Refills: 0 | Status: SHIPPED | OUTPATIENT
Start: 2023-11-14 | End: 2023-11-24

## 2023-11-14 NOTE — PROGRESS NOTES
Chief Complaint  Follow-up of the Left Shoulder    Subjective          History of Present Illness      Laxmi Jc is a 43 y.o. female  presents to [facility] for     Patient presents for follow-up evaluation of left shoulder manipulation with arthroscopic rotator cuff debridement, labral debridement and subpectoral bicep tenodesis, 8/21/2023.  Patient states she has continued to do well with therapy and she is advancing with range of motion and strength.  She states she cannot lift half a gallon of milk she states she can reach above her head reach behind her back better.  She does have some pain after therapy visits some pain at night if she lays on her side she might have some trouble getting comfortable otherwise no new complaints she is happy with her progress.  She states she is ready to return to work with no restrictions.      Allergies   Allergen Reactions    Floxin Otic [Ofloxacin] Other (See Comments)     Swelling of ear canal     Penicillins Rash and Other (See Comments)    Sulfa Antibiotics Rash and Other (See Comments)    Sulfamethoxazole-Trimethoprim Rash        Social History     Socioeconomic History    Marital status:    Tobacco Use    Smoking status: Every Day     Packs/day: 0.50     Years: 5.00     Additional pack years: 0.00     Total pack years: 2.50     Types: Cigarettes     Start date: 2018     Passive exposure: Current    Smokeless tobacco: Never    Tobacco comments:     Has smoked this 8-21-23   Vaping Use    Vaping Use: Never used   Substance and Sexual Activity    Alcohol use: Never    Drug use: Never    Sexual activity: Defer        REVIEW OF SYSTEMS    Constitutional: Awake alert and oriented x3, no acute distress, denies fevers, chills, weight loss  Respiratory: No respiratory distress  Vascular: Brisk cap refill, Intact distal pulses, No cyanosis, compartments soft with no signs or symptoms of compartment syndrome or DVT.   Cardiovascular:  "Denies chest pain, shortness of breath  Skin: Denies rashes, acute skin changes  Neurologic: Denies headache, loss of consciousness  MSK: Left shoulder pain      Objective   Vital Signs:   /82   Pulse 80   Ht 157.5 cm (62\")   Wt 66.5 kg (146 lb 9.7 oz)   SpO2 97%   BMI 26.81 kg/m²     Body mass index is 26.81 kg/m².    Physical Exam       Left shoulder: Well-healed incisions, no erythema, no ecchymosis or swelling, no atrophy or signs of infection, active forward elevation 175 active abduction 120, external rotation with abduction 80, internal rotation to T12 5 out of 5 strength, external rotation at side 45, neurovascular intact      Procedures    Imaging Results (Most Recent)       None             Result Review :   The following data was reviewed by: LORAINE Mcintosh on 11/14/2023:               Assessment and Plan    Diagnoses and all orders for this visit:    1. Aftercare following surgery of LEFT SHOULDER MANIPULATION ARTHROSCOPIC ROTATOR CUFF DEBRIDEMENT AND SUBACROMIAL DECOMPRESSION labral debridement, manipulation, subpectoral biceps tenodesis 8/21/23 (Primary)    Other orders  -     cyclobenzaprine (FLEXERIL) 10 MG tablet; Take 1 tablet by mouth 3 (Three) Times a Day As Needed for Muscle Spasms for up to 10 days.  Dispense: 30 tablet; Refill: 0        Discussed diagnosis and treatment options with the patient she was given a short course of Flexeril to take as needed, continue/finish therapy she has 8 more visits, return to work note was given for Monday follow-up as needed    Call or return if worsening symptoms.    Follow Up   Return if symptoms worsen or fail to improve, for Recheck.  Patient was given instructions and counseling regarding her condition or for health maintenance advice. Please see specific information pulled into the AVS if appropriate.       EMR Dragon/Transcription disclaimer:  Much of this encounter note is an electronic transcription/translation of spoken " language to printed text, aka voice recognition.  The electronic translation of spoken language may permit erroneous or at times nonsensical words or phrases to be inadvertently transcribed; although I have reviewed the note for such errors, some may still exist so please interpret based on surrounding text content.

## 2023-11-21 ENCOUNTER — TREATMENT (OUTPATIENT)
Dept: PHYSICAL THERAPY | Facility: CLINIC | Age: 43
End: 2023-11-21
Payer: OTHER GOVERNMENT

## 2023-11-21 DIAGNOSIS — R29.898 WEAKNESS OF LEFT UPPER EXTREMITY: ICD-10-CM

## 2023-11-21 DIAGNOSIS — Z98.890 S/P SHOULDER SURGERY: Primary | ICD-10-CM

## 2023-11-21 DIAGNOSIS — M25.512 ACUTE PAIN OF LEFT SHOULDER: ICD-10-CM

## 2023-11-21 DIAGNOSIS — M25.612 STIFFNESS OF LEFT SHOULDER JOINT: ICD-10-CM

## 2023-11-21 DIAGNOSIS — Z98.890 STATUS POST DECOMPRESSION OF SUBACROMIAL SPACE: ICD-10-CM

## 2023-11-21 NOTE — PROGRESS NOTES
OT Re-evaluation/Progress Note  [place]  OT: [address]  [place] [address]    Patient: Laxmi Jc   : 1980  Diagnosis/ICD-10 Code:  S/P shoulder surgery [Z98.890]  Referring practitioner: Yousuf Gomez MD  Date of Initial Visit: Type: THERAPY  Noted: 2023  Today's Date: 2023  Patient seen for 25 sessions      Subjective:   Subjective Questionnaire: QuickDASH:   Clinical Progress: improved  Home Program Compliance: Yes  Treatment has included: therapeutic exercise, manual therapy, and therapeutic activity    Subjective Evaluation    Pain  Current pain ratin       Objective          Observations     Additional Shoulder Observation Details  Well healing incision to L shoulder.    Neurological Testing     Additional Neurological Details  Pt reports numbness along medial elbow.    Active Range of Motion   Left Shoulder   Flexion: WFL  Abduction: WFL  External rotation BTH: T1   Internal rotation BTB: lumbar     Additional Active Range of Motion Details  Pt unable to actively move L UE secondary to nerve block.    Passive Range of Motion     Right Shoulder   Flexion: 145 degrees   Abduction: 145 degrees   External rotation 90°: 40 degrees   Internal rotation 90°: 40 degrees     Strength/Myotome Testing     Left Shoulder     Planes of Motion   Flexion: 5   Extension: 5   Abduction: 4+   Adduction: 5   External rotation at 0°: 4+   Internal rotation at 0°: 5     Isolated Muscles   Biceps: 5   Triceps: 5       Assessment & Plan       Assessment  Impairments: abnormal or restricted ROM, activity intolerance, impaired physical strength, lacks appropriate home exercise program and pain with function   Functional limitations: carrying objects, lifting, sleeping, pulling, pushing, uncomfortable because of pain, moving in bed, reaching behind back and reaching overhead   Assessment details: Pt displays improved A/PROM and strength this date. Pt met 5/5 STGs and 3/4 LTGs. Pt displays  mild compensatory movements with abd and flx. Pt continues with decreased fxl strength that limits ability to complete ADLs/IADLs.  Prognosis: good    Goals  Plan Goals: SHOULDER  PROBLEMS:     1. The patient has limited ROM of the L shoulder.    LTG 1: 12 weeks:  The patient will demonstrate 140 degrees of active shoulder flexion, 140 degrees of shoulder abduction, external rotation to behind head, and internal rotation to lumbar spine to allow the patient to reach into upper kitchen cabinets and manipulate clothing behind the back with greater ease.    STATUS:  Met   STG 1a: 8 weeks:  The patient will demonstrate 140 degrees of passive shoulder flexion, 140 degrees of passive shoulder abduction, 40 degrees of passive shoulder external rotation, and 40 degrees of passive shoulder internal rotation.    STATUS:  Met   TREATMENT: Manual therapy, therapeutic exercise, home exercise instruction, and modalities as needed to include: electrical stimulation, ultrasound, moist heat, and ice.    2. The patient has limited strength of the L shoulder.   LTG 2: 12 weeks:  The patient will demonstrate 5 /5 strength for L shoulder flexion, abduction, external rotation, and internal rotation in order to demonstrate improved shoulder stability.    STATUS:  Not met   STG 2a: 8 weeks:  The patient will demonstrate ability to tolerate MMT for L shoulder flexion, abduction, external rotation, and internal rotation.    STATUS:  Met   STG2b:  8 weeks:  The patient will be independent with home exercises.     STATUS:  Met   TREATMENT: Manual therapy, therapeutic exercise, home exercise instruction, and modalities as needed to include: electrical stimulation, ultrasound, moist heat, and ice.     3. The patient complains of pain to the L shoulder.   LTG 3: 12 weeks:  The patient will report a pain rating of 1 /10 or better in order to improve sleep quality and tolerance to performance of activities of daily living.    STATUS:  Met   STG 3a:  8 weeks:  The patient will report a pain rating of 3 /10 or better.     STATUS:  Met   TREATMENT: Manual therapy, therapeutic exercise, home exercise instruction, and modalities as needed to include: electrical stimulation, ultrasound, moist heat, and ice.    4. Carrying, Moving, and Handling Objects Functional Limitation     LTG 4: 12 weeks:  The patient will demonstrate 1-19 % limitation by achieving a score of 19 on the QuickDASH.    STATUS:  Met   STG 4a: 8 weeks:  The patient will demonstrate 20-39 % limitation by achieving a score of 27 on the QuickDASH.      STATUS:  Met   TREATMENT:  Manual therapy, therapeutic exercise, home exercise instruction, and modalities as needed to include: moist heat, electrical stimulation, and ultrasound.     Plan  Planned modality interventions: cryotherapy and thermotherapy (hydrocollator packs)  Planned therapy interventions: body mechanics training, fine motor coordination training, flexibility, functional ROM exercises, home exercise program, joint mobilization, manual therapy, neuromuscular re-education, postural training, soft tissue mobilization, strengthening, stretching and therapeutic activities  Frequency: 2x week  Duration in weeks: 4  Treatment plan discussed with: patient  Plan details: Continue with plan of care with emphasis on increasing ROM and strength.      Progress toward previous goals: Partially Met      Recommendations: Continue as planned  Timeframe: 1 month  Prognosis to achieve goals: good    OT SIGNATURE: Yousuf Lynne OT   DATE TREATMENT INITIATED: Type: THERAPY  Noted: 8/22/2023  [place] License: [id number]    Certification Period: 11/21/2023 - 2/18/2024        Based upon review of the patient's progress and continued therapy plan, it is my medical opinion that Laxmi Jc should continue occupational therapy treatment at [facility]  [address] [place] [id number]  [phone].    Signature:  __________________________________  Yousuf Gomez MD MD NPI: [id number]  Timed:  Manual Therapy:    8     mins  74067;  Therapeutic Exercise:    15     mins  96566;       Therapeutic Activity:     15     mins  51893;       Timed Treatment:   38   mins   Total Treatment:     38   mins  Please sign and return via fax to [phone]  . Thank you, [facility].

## 2023-11-27 ENCOUNTER — TREATMENT (OUTPATIENT)
Dept: PHYSICAL THERAPY | Facility: CLINIC | Age: 43
End: 2023-11-27
Payer: OTHER GOVERNMENT

## 2023-11-27 DIAGNOSIS — R29.898 WEAKNESS OF LEFT UPPER EXTREMITY: ICD-10-CM

## 2023-11-27 DIAGNOSIS — M25.512 ACUTE PAIN OF LEFT SHOULDER: ICD-10-CM

## 2023-11-27 DIAGNOSIS — M25.612 STIFFNESS OF LEFT SHOULDER JOINT: ICD-10-CM

## 2023-11-27 DIAGNOSIS — Z98.890 S/P SHOULDER SURGERY: Primary | ICD-10-CM

## 2023-11-27 DIAGNOSIS — Z98.890 STATUS POST DECOMPRESSION OF SUBACROMIAL SPACE: ICD-10-CM

## 2023-11-27 PROCEDURE — 97110 THERAPEUTIC EXERCISES: CPT | Performed by: OCCUPATIONAL THERAPIST

## 2023-11-27 PROCEDURE — 97530 THERAPEUTIC ACTIVITIES: CPT | Performed by: OCCUPATIONAL THERAPIST

## 2023-11-27 PROCEDURE — 97140 MANUAL THERAPY 1/> REGIONS: CPT | Performed by: OCCUPATIONAL THERAPIST

## 2023-11-27 NOTE — PROGRESS NOTES
Occupational Therapy Daily Treatment Note  [place] OT: [address] [place] [address]      Patient: Laxmi Jc   : 1980  Diagnosis/ICD-10 Code:  S/P shoulder surgery [Z98.890]  Referring practitioner: Yousuf Gomez MD  Date of Initial Visit: Type: THERAPY  Noted: 2023  Today's Date: 2023  Patient seen for 26 sessions             Subjective   Laxmi Jc reports: No current pain. Pt reports shoulder is doing well.    Objective     See Exercise, Manual, and Modality Logs for complete treatment.       Assessment/Plan  Pt displays improved strength this date. Pt tolerated well with no c/o increased pain.  Progress per Plan of Care           Timed:  Manual Therapy:    8     mins  88703;  Therapeutic Exercise:    15     mins  62825;     Therapeutic Activity:     15     mins  88751;       Timed Treatment:   38   mins   Total Treatment:     38   mins        Yousuf Lynne OT  Occupational Therapist  [place] License: [id number]  NPI: [id number]

## 2023-11-29 ENCOUNTER — TREATMENT (OUTPATIENT)
Dept: PHYSICAL THERAPY | Facility: CLINIC | Age: 43
End: 2023-11-29
Payer: OTHER GOVERNMENT

## 2023-11-29 ENCOUNTER — TELEPHONE (OUTPATIENT)
Dept: PHYSICAL THERAPY | Facility: CLINIC | Age: 43
End: 2023-11-29

## 2023-11-29 DIAGNOSIS — M25.612 STIFFNESS OF LEFT SHOULDER JOINT: ICD-10-CM

## 2023-11-29 DIAGNOSIS — R29.898 WEAKNESS OF LEFT UPPER EXTREMITY: ICD-10-CM

## 2023-11-29 DIAGNOSIS — M25.512 ACUTE PAIN OF LEFT SHOULDER: ICD-10-CM

## 2023-11-29 DIAGNOSIS — Z98.890 STATUS POST DECOMPRESSION OF SUBACROMIAL SPACE: ICD-10-CM

## 2023-11-29 DIAGNOSIS — Z98.890 S/P SHOULDER SURGERY: Primary | ICD-10-CM

## 2023-11-29 NOTE — PROGRESS NOTES
Occupational Therapy Daily Treatment Note  [place] OT: [address] [place] [address]      Patient: Laxmi Jc   : 1980  Diagnosis/ICD-10 Code:  S/P shoulder surgery [Z98.890]  Referring practitioner: Yousuf Gomez MD  Date of Initial Visit: Type: THERAPY  Noted: 2023  Today's Date: 2023  Patient seen for 27 sessions             Subjective   Laxmi Jc reports: She has noticed a pinching feeling in her shoulder with end range flx and abd. She reports this began this morning.    Objective     See Exercise, Manual, and Modality Logs for complete treatment.       Assessment/Plan  OT provided sleeper stretch HEP. Teachback successful. Pt tolerated treatment well this date.  Progress per Plan of Care           Timed:  Manual Therapy:    8     mins  42832;  Therapeutic Exercise:    15     mins  54258;     Therapeutic Activity:     15     mins  37155;       Timed Treatment:   38   mins   Total Treatment:     38   mins        Yousuf Lynne OT  Occupational Therapist  [place] License: [id number]  NPI: [id number]

## 2023-11-29 NOTE — TELEPHONE ENCOUNTER
Caller: Laxmi Jc    Relationship: Self    Best call back [phone]       Do you know the name of the person who called: ESA    What was the call regarding: WAS RETURNING LIAM CALL, PLEASE CALL HER BACK

## 2023-12-04 ENCOUNTER — TREATMENT (OUTPATIENT)
Dept: PHYSICAL THERAPY | Facility: CLINIC | Age: 43
End: 2023-12-04
Payer: OTHER GOVERNMENT

## 2023-12-04 DIAGNOSIS — M25.512 ACUTE PAIN OF LEFT SHOULDER: ICD-10-CM

## 2023-12-04 DIAGNOSIS — M25.612 STIFFNESS OF LEFT SHOULDER JOINT: ICD-10-CM

## 2023-12-04 DIAGNOSIS — Z98.890 S/P SHOULDER SURGERY: Primary | ICD-10-CM

## 2023-12-04 DIAGNOSIS — R29.898 WEAKNESS OF LEFT UPPER EXTREMITY: ICD-10-CM

## 2023-12-04 DIAGNOSIS — Z98.890 STATUS POST DECOMPRESSION OF SUBACROMIAL SPACE: ICD-10-CM

## 2023-12-04 PROCEDURE — 97530 THERAPEUTIC ACTIVITIES: CPT | Performed by: OCCUPATIONAL THERAPIST

## 2023-12-04 PROCEDURE — 97140 MANUAL THERAPY 1/> REGIONS: CPT | Performed by: OCCUPATIONAL THERAPIST

## 2023-12-04 NOTE — PROGRESS NOTES
OT Discharge Note  Y[place]  OT: [address]  [place] [address]    Patient: Laxmi Jc   : 1980  Diagnosis/ICD-10 Code:  S/P shoulder surgery [Z98.890]  Referring practitioner: Yousuf Gomez MD  Date of Initial Visit: Type: THERAPY  Noted: 2023  Today's Date: 2023  Patient seen for 28 sessions      Subjective:   Subjective Questionnaire: QuickDASH: 15/55  Clinical Progress: improved  Home Program Compliance: Yes  Treatment has included: therapeutic exercise, manual therapy, and therapeutic activity    Subjective   Objective          Observations     Additional Shoulder Observation Details  Well healing incision to L shoulder.    Neurological Testing     Additional Neurological Details  Pt reports resolving numbness along medial elbow.    Active Range of Motion   Left Shoulder   Flexion: WFL  Abduction: WFL  External rotation BTH: T1   Internal rotation BTB: mid thoracic     Passive Range of Motion   Left Shoulder   Flexion: 155 degrees   Abduction: WFL  External rotation 90°: 50 degrees   Internal rotation 90°: 50 degrees     Strength/Myotome Testing     Left Shoulder     Planes of Motion   Flexion: 5   Extension: 5   Abduction: 5   Adduction: 5   External rotation at 0°: 5   Internal rotation at 0°: 5     Isolated Muscles   Biceps: 5   Triceps: 5       Assessment & Plan       Assessment  Impairments: abnormal or restricted ROM, activity intolerance, impaired physical strength, lacks appropriate home exercise program and pain with function   Functional limitations: carrying objects, lifting, sleeping, pulling, pushing, uncomfortable because of pain, moving in bed, reaching behind back and reaching overhead   Assessment details: Pt displays improved PROM and strength this date. Pt met 5/5 STGs and 4/4 LTGs. Pt to progress to [facility] at this time.  Prognosis: good    Goals  Plan Goals: SHOULDER  PROBLEMS:     1. The patient has limited ROM of the L shoulder.    LTG 1: 12 weeks:  The  patient will demonstrate 140 degrees of active shoulder flexion, 140 degrees of shoulder abduction, external rotation to behind head, and internal rotation to lumbar spine to allow the patient to reach into upper kitchen cabinets and manipulate clothing behind the back with greater ease.    STATUS:  Met   STG 1a: 8 weeks:  The patient will demonstrate 140 degrees of passive shoulder flexion, 140 degrees of passive shoulder abduction, 40 degrees of passive shoulder external rotation, and 40 degrees of passive shoulder internal rotation.    STATUS:  Met   TREATMENT: Manual therapy, therapeutic exercise, home exercise instruction, and modalities as needed to include: electrical stimulation, ultrasound, moist heat, and ice.    2. The patient has limited strength of the L shoulder.   LTG 2: 12 weeks:  The patient will demonstrate 5 /5 strength for L shoulder flexion, abduction, external rotation, and internal rotation in order to demonstrate improved shoulder stability.    STATUS:  Met   STG 2a: 8 weeks:  The patient will demonstrate ability to tolerate MMT for L shoulder flexion, abduction, external rotation, and internal rotation.    STATUS:  Met   STG2b:  8 weeks:  The patient will be independent with home exercises.     STATUS:  Met   TREATMENT: Manual therapy, therapeutic exercise, home exercise instruction, and modalities as needed to include: electrical stimulation, ultrasound, moist heat, and ice.     3. The patient complains of pain to the L shoulder.   LTG 3: 12 weeks:  The patient will report a pain rating of 1 /10 or better in order to improve sleep quality and tolerance to performance of activities of daily living.    STATUS:  Met   STG 3a: 8 weeks:  The patient will report a pain rating of 3 /10 or better.     STATUS:  Met   TREATMENT: Manual therapy, therapeutic exercise, home exercise instruction, and modalities as needed to include: electrical stimulation, ultrasound, moist heat, and ice.    4. Carrying,  Moving, and Handling Objects Functional Limitation     LTG 4: 12 weeks:  The patient will demonstrate 1-19 % limitation by achieving a score of 19 on the QuickDASH.    STATUS:  Met   STG 4a: 8 weeks:  The patient will demonstrate 20-39 % limitation by achieving a score of 27 on the QuickDASH.      STATUS:  Met   TREATMENT:  Manual therapy, therapeutic exercise, home exercise instruction, and modalities as needed to include: moist heat, electrical stimulation, and ultrasound.     Plan  Planned modality interventions: cryotherapy and thermotherapy (hydrocollator packs)  Planned therapy interventions: body mechanics training, fine motor coordination training, flexibility, functional ROM exercises, home exercise program, joint mobilization, manual therapy, neuromuscular re-education, postural training, soft tissue mobilization, strengthening, stretching and therapeutic activities  Frequency: 2x week  Duration in weeks: 4  Treatment plan discussed with: patient  Plan details: Continue with plan of care with emphasis on increasing ROM and strength.      Progress toward previous goals: All Met      Recommendations: Discharge    OT SIGNATURE: Yousuf Lynne OT   DATE TREATMENT INITIATED: Type: THERAPY  Noted: 8/22/2023  [place] License: [id number]    Based upon review of the patient's progress and continued therapy plan, it is my medical opinion that Laxmi Jc should continue occupational therapy treatment at [facility]  [address] [place] [id number] [phone].    Signature: __________________________________  Yousuf Gomez MD MD NPI:   Timed:  Manual Therapy:    8     mins  [id number];  Therapeutic Exercise:    7     mins  [id number];     Therapeutic Activity:     10     mins  [id number];       Timed Treatment:   25   mins   Total Treatment:     25   mins  Please sign and return via fax to [phone]  . Thank you, [facility].

## 2023-12-05 ENCOUNTER — OFFICE VISIT (OUTPATIENT)
Dept: OTOLARYNGOLOGY | Facility: CLINIC | Age: 43
End: 2023-12-05
Payer: OTHER GOVERNMENT

## 2023-12-05 VITALS — HEIGHT: 62 IN | TEMPERATURE: 97.2 F | WEIGHT: 147.2 LBS | BODY MASS INDEX: 27.09 KG/M2

## 2023-12-05 DIAGNOSIS — L29.9 EAR ITCH: Primary | ICD-10-CM

## 2023-12-05 DIAGNOSIS — K13.70 ORAL LESION: ICD-10-CM

## 2023-12-05 NOTE — PROGRESS NOTES
Patient Name: Laxmi Jc   Visit Date: 12/05/2023   Patient ID: [id number]  Provider: Yobani Warner MD    Sex: female  Location: [facility]   YOB: 1980  Location Address: [address] [place] [address], [address],?[place]?[address]    Primary Care Provider Misa Chavez APRN  Location Phone: [phone]    Referring Provider: Yousuf Gomez MD        Chief Complaint  Follow-up (Chronic suppurative otitis media right ear )    Subjective    History of Present Illness  Laxmi Jc is a 43 y.o. female who presents to [facility] today as a consult from Yousuf Gomez MD.    She presents to the clinic today for evaluation of of her ears as well as leukoplakia underneath the tongue.  She notes that her ears have had some chronic issues with drainage on and off and she has been both on antibiotic and antifungal drops and ointment for this.  She describes clear watery drainage in the mornings when she was having infection.  She is currently having no issues whatsoever other than ear itching.  Denies any major changes to her hearing currently, but has had muffled hearing with infections.  She was seen by Dr. Sow for this about a year ago.    She informs me that she is trying to quit smoking and uses nicotine replacement lozenges which she places under her tongue.  She has had some irritation under the tongue and her dental hygienist noted some leukoplakia that was concerning for malignancy.    Past Medical History:   Diagnosis Date    Adhesive capsulitis of left shoulder     Anxiety     HX OF NO CURRENT ISSUES AND NOT ON ANY MEDICATIONS    Low back pain     HAS HAD 1 EPISODE OF LOWER BACK PAIN HAD SPRAINED IT. NO CURRENT ISSUES    Migraines     Palpitations     DENIES CP/SOA. REPORTS STARTED POST COVID.  FOLLOWED BY DR GUADARRAMA YEARLY. ACTIVE    Pituitary adenoma     FOLLOWED BY DR ESTEBAN NEUROLOG[facility]    Polymenorrhea     PT REPORTS HAS  PERIODS ABOUT EVERY 2 WEEKS    Scleroderma     nonreactive    Tear of left rotator cuff        Past Surgical History:   Procedure Laterality Date    BREAST LUMPECTOMY Left     FAT GRAFTING Left     repair of lumpectomy    KIDNEY STONE SURGERY      OVARIAN CYST REMOVAL      SHOULDER ARTHROSCOPY W/ ROTATOR CUFF REPAIR Left 8/21/2023    Procedure: LEFT SHOULDER MANIPULATION,  ARTHROSCOPY ROTATOR CUFF DEBRIDEMENT AND SUBACROMIAL DECOMPRESSION , labral debridement, subpectoral biceps tenodesis;  Surgeon: Jv Norton MD;  Location: [facility];  Service: Orthopedics;  Laterality: Left;         Current Outpatient Medications:     cetirizine (zyrTEC) 10 MG tablet, Take 1 tablet by mouth Daily., Disp: 90 tablet, Rfl: 3    ibuprofen (ADVIL,MOTRIN) 800 MG tablet, Take 1 tablet by mouth Every 8 (Eight) Hours As Needed for Mild Pain., Disp: 180 tablet, Rfl: 0    metoprolol succinate XL (TOPROL-XL) 25 MG 24 hr tablet, TAKE 1 TABLET DAILY, Disp: 90 tablet, Rfl: 3    Norditropin FlexPro 5 MG/1.5ML solution pen-injector, Inject 0.25 mg under the skin into the appropriate area as directed. REPORTS THAT TAKES NORMALLY MONDAY THROUGH FRIDAY BUT HER LAST DOSE WAS OVER 2 WEEKS STOPPED ON OWN DIDN'T KNOW IF WOULD INTERFERE WITH HER SURGERY, Disp: , Rfl:     topiramate (TOPAMAX) 100 MG tablet, Take 1 tablet by mouth 2 (Two) Times a Day., Disp: , Rfl:     azithromycin (Zithromax Z-Sonny) 250 MG tablet, Take 2 tablets by mouth on day 1, then 1 tablet daily on days 2-5 (Patient not taking: Reported on 12/5/2023), Disp: 6 tablet, Rfl: 0    naloxone (NARCAN) 4 MG/0.1ML nasal spray, Call 911. Don't prime. [place] in 1 nostril for overdose. Repeat in 2-3 minutes in other nostril if no or minimal breathing/responsiveness. (Patient not taking: Reported on 12/5/2023), Disp: 2 each, Rfl: 0    oxyCODONE-acetaminophen (PERCOCET) 7.5-325 MG per tablet, Take 1-2 tablets by mouth Every 6 (Six) Hours As Needed for Moderate Pain. (Patient not taking:  "Reported on 12/5/2023), Disp: 36 tablet, Rfl: 0     Allergies   Allergen Reactions    Floxin Otic [Ofloxacin] Other (See Comments)     Swelling of ear canal     Penicillins Rash and Other (See Comments)    Sulfa Antibiotics Rash and Other (See Comments)    Sulfamethoxazole-Trimethoprim Rash       Family History   Problem Relation Age of Onset    Cancer Mother         Colon    Kidney disease Mother     Hepatitis Mother     Autoimmune disease Mother     Bleeding Disorder Mother     Hyperlipidemia Father     Hypertension Father     Diabetes Father     Stroke Father     Cancer Maternal Grandmother         Stomach    Malig Hyperthermia Neg Hx         Social History     Social History Narrative    Not on file       Objective     Vital Signs:   Temp 97.2 °F (36.2 °C) (Tympanic)   Ht 157.5 cm (62\")   Wt 66.8 kg (147 lb 3.2 oz)   BMI 26.92 kg/m²       Physical Exam    Constitutional   Appearance  : well developed, well-nourished, alert and in no acute distress, voice clear and strong    Head  Inspection  : no deformities or lesions  Face  Inspection  : No facial lesions; House-Brackmann I/VI bilaterally  Palpation  : No TMJ crepitus nor  muscle tenderness bilaterally    Eyes  Vision  Visual Fields  : Extraocular movements are intact. No spontaneous or gaze-induced nystagmus.  Conjunctivae  : clear  Sclerae  : clear  Pupils and Irises  : pupils equal, round, and reactive to light.     Ears, Nose, Mouth and Throat    Ears    External Ears  : appearance within normal limits, no lesions present  Otoscopic Examination  : Tympanic membrane appearance within normal limits bilaterally without perforations, well-aerated middle ears  Hearing  : intact to conversational voice both ears  Tunning fork testing:     :    Nose    External Nose  : appearance normal  Intranasal Exam  : mucosa within normal limits, vestibules normal, no intranasal lesions present, septum midline, sinuses non tender to percussion  Oral " Cavity    Oral Mucosa  : oral mucosa normal without pallor or cyanosis  Lips  : lip appearance normal  Teeth  : normal dentition for age  Gums  : gums pink, non-swollen, no bleeding present  Tongue  : tongue appearance normal; normal mobility.  Along the floor of mouth there are several patches of very mild leukoplakia or hyperkeratosis.  No evidence of ulceration or any distinct lesions.  Palate  : hard palate normal, soft palate appearance normal with symmetric mobility    Throat    Oropharynx  : no inflammation or lesions present, tonsils within normal limits  Hypopharynx  : appearance within normal limits, superior epiglottis within normal limits  Larynx  : appearance within normal limits, vocal cords within normal limits, no lesions present    Neck  Inspection/Palpation  : normal appearance, no masses or tenderness, trachea midline; thyroid size normal, nontender, no nodules or masses present on palpation    Respiratory  Respiratory Effort  : breathing unlabored  Inspection of Chest  : normal appearance, no retractions    Cardiovascular  Heart  : regular rate and rhythm    Lymphatic  Neck  : no lymphadenopathy present  Supraclavicular Nodes  : no lymphadenopathy present  Preauricular Nodes  : no lymphadenopathy present    Skin and Subcutaneous Tissue  General Inspection  : Regarding face and neck - there are no rashes present, no lesions present, and no areas of discoloration    Neurologic  Cranial Nerves  : cranial nerves II-XII are grossly intact bilaterally  Gait and Station  : normal gait, able to stand without diffculty    Psychiatric  Judgement and Insight  : judgment and insight intact  Mood and Affect  : mood normal, affect appropriate              Assessment and Plan    Diagnoses and all orders for this visit:    1. Ear itch (Primary)    2. Oral lesion    Exam of the ears today was completely normal.  I did not see any evidence of fluid or infection.  She does have dry ear canals and I recommended that  she not use Q-tips and use a moisturizing ointment to help soothe the skin in hopes of preventing the itching.  Oral cavity exam did reveal irritated dry mucosa and faint mild leukoplakia and several small areas along the floor of mouth.  I recommended that she no longer place any medications in this area to allow for the mucosa to recover and we discussed avoiding smoking.  If there are any changes to the site I have asked that she self examine this area and contact me for reevaluation.    Follow Up   No follow-ups on file.  Patient was given instructions and counseling regarding her condition or for health maintenance advice. Please see specific information pulled into the AVS if appropriate.

## 2024-01-17 ENCOUNTER — OFFICE VISIT (OUTPATIENT)
Dept: FAMILY MEDICINE CLINIC | Facility: CLINIC | Age: 44
End: 2024-01-17
Payer: OTHER GOVERNMENT

## 2024-01-17 VITALS
HEART RATE: 74 BPM | SYSTOLIC BLOOD PRESSURE: 120 MMHG | HEIGHT: 62 IN | BODY MASS INDEX: 28.34 KG/M2 | WEIGHT: 154 LBS | OXYGEN SATURATION: 99 % | DIASTOLIC BLOOD PRESSURE: 80 MMHG

## 2024-01-17 DIAGNOSIS — Z12.31 BREAST CANCER SCREENING BY MAMMOGRAM: ICD-10-CM

## 2024-01-17 DIAGNOSIS — Z87.442 HISTORY OF KIDNEY STONES: ICD-10-CM

## 2024-01-17 DIAGNOSIS — N20.0 BILATERAL KIDNEY STONES: ICD-10-CM

## 2024-01-17 DIAGNOSIS — Z00.00 ANNUAL PHYSICAL EXAM: Primary | ICD-10-CM

## 2024-01-17 DIAGNOSIS — G89.29 CHRONIC RIGHT SHOULDER PAIN: ICD-10-CM

## 2024-01-17 DIAGNOSIS — Z23 NEED FOR PNEUMOCOCCAL 20-VALENT CONJUGATE VACCINATION: ICD-10-CM

## 2024-01-17 DIAGNOSIS — F17.218 CIGARETTE NICOTINE DEPENDENCE WITH OTHER NICOTINE-INDUCED DISORDER: ICD-10-CM

## 2024-01-17 DIAGNOSIS — Z23 NEED FOR INFLUENZA VACCINATION: ICD-10-CM

## 2024-01-17 DIAGNOSIS — L65.9 HAIR LOSS DISORDER: ICD-10-CM

## 2024-01-17 DIAGNOSIS — M25.511 CHRONIC RIGHT SHOULDER PAIN: ICD-10-CM

## 2024-01-17 LAB
25(OH)D3 SERPL-MCNC: 18.6 NG/ML (ref 30–100)
ALBUMIN SERPL-MCNC: 4.2 G/DL (ref 3.5–5.2)
ALBUMIN/GLOB SERPL: 1.4 G/DL
ALP SERPL-CCNC: 74 U/L (ref 39–117)
ALT SERPL W P-5'-P-CCNC: 11 U/L (ref 1–33)
ANION GAP SERPL CALCULATED.3IONS-SCNC: 11.6 MMOL/L (ref 5–15)
AST SERPL-CCNC: 12 U/L (ref 1–32)
BASOPHILS # BLD AUTO: 0.1 10*3/MM3 (ref 0–0.2)
BASOPHILS NFR BLD AUTO: 1.4 % (ref 0–1.5)
BILIRUB SERPL-MCNC: <0.2 MG/DL (ref 0–1.2)
BUN SERPL-MCNC: 14 MG/DL (ref 6–20)
BUN/CREAT SERPL: 21.9 (ref 7–25)
CALCIUM SPEC-SCNC: 9.1 MG/DL (ref 8.6–10.5)
CHLORIDE SERPL-SCNC: 101 MMOL/L (ref 98–107)
CHOLEST SERPL-MCNC: 179 MG/DL (ref 0–200)
CO2 SERPL-SCNC: 23.4 MMOL/L (ref 22–29)
CREAT SERPL-MCNC: 0.64 MG/DL (ref 0.57–1)
DEPRECATED RDW RBC AUTO: 42.2 FL (ref 37–54)
EGFRCR SERPLBLD CKD-EPI 2021: 112.6 ML/MIN/1.73
EOSINOPHIL # BLD AUTO: 0.61 10*3/MM3 (ref 0–0.4)
EOSINOPHIL NFR BLD AUTO: 8.6 % (ref 0.3–6.2)
ERYTHROCYTE [DISTWIDTH] IN BLOOD BY AUTOMATED COUNT: 12.4 % (ref 12.3–15.4)
FERRITIN SERPL-MCNC: 42.8 NG/ML (ref 13–150)
FOLATE SERPL-MCNC: 5.66 NG/ML (ref 4.78–24.2)
GLOBULIN UR ELPH-MCNC: 2.9 GM/DL
GLUCOSE SERPL-MCNC: 89 MG/DL (ref 65–99)
HCT VFR BLD AUTO: 38.5 % (ref 34–46.6)
HDLC SERPL-MCNC: 53 MG/DL (ref 40–60)
HGB BLD-MCNC: 13.3 G/DL (ref 12–15.9)
IMM GRANULOCYTES # BLD AUTO: 0.02 10*3/MM3 (ref 0–0.05)
IMM GRANULOCYTES NFR BLD AUTO: 0.3 % (ref 0–0.5)
IRON 24H UR-MRATE: 46 MCG/DL (ref 37–145)
IRON SATN MFR SERPL: 14 % (ref 20–50)
LDLC SERPL CALC-MCNC: 116 MG/DL (ref 0–100)
LDLC/HDLC SERPL: 2.18 {RATIO}
LYMPHOCYTES # BLD AUTO: 1.65 10*3/MM3 (ref 0.7–3.1)
LYMPHOCYTES NFR BLD AUTO: 23.3 % (ref 19.6–45.3)
MCH RBC QN AUTO: 32.3 PG (ref 26.6–33)
MCHC RBC AUTO-ENTMCNC: 34.5 G/DL (ref 31.5–35.7)
MCV RBC AUTO: 93.4 FL (ref 79–97)
MONOCYTES # BLD AUTO: 0.44 10*3/MM3 (ref 0.1–0.9)
MONOCYTES NFR BLD AUTO: 6.2 % (ref 5–12)
NEUTROPHILS NFR BLD AUTO: 4.26 10*3/MM3 (ref 1.7–7)
NEUTROPHILS NFR BLD AUTO: 60.2 % (ref 42.7–76)
NRBC BLD AUTO-RTO: 0 /100 WBC (ref 0–0.2)
PLATELET # BLD AUTO: 336 10*3/MM3 (ref 140–450)
PMV BLD AUTO: 10.1 FL (ref 6–12)
POTASSIUM SERPL-SCNC: 3.9 MMOL/L (ref 3.5–5.2)
PROT SERPL-MCNC: 7.1 G/DL (ref 6–8.5)
RBC # BLD AUTO: 4.12 10*6/MM3 (ref 3.77–5.28)
SODIUM SERPL-SCNC: 136 MMOL/L (ref 136–145)
T4 FREE SERPL-MCNC: 1 NG/DL (ref 0.93–1.7)
TIBC SERPL-MCNC: 332 MCG/DL (ref 298–536)
TRANSFERRIN SERPL-MCNC: 223 MG/DL (ref 200–360)
TRIGL SERPL-MCNC: 51 MG/DL (ref 0–150)
TSH SERPL DL<=0.05 MIU/L-ACNC: 1.22 UIU/ML (ref 0.27–4.2)
VIT B12 BLD-MCNC: 513 PG/ML (ref 211–946)
VLDLC SERPL-MCNC: 10 MG/DL (ref 5–40)
WBC NRBC COR # BLD AUTO: 7.08 10*3/MM3 (ref 3.4–10.8)

## 2024-01-17 PROCEDURE — 80061 LIPID PANEL: CPT | Performed by: NURSE PRACTITIONER

## 2024-01-17 PROCEDURE — 85025 COMPLETE CBC W/AUTO DIFF WBC: CPT | Performed by: NURSE PRACTITIONER

## 2024-01-17 PROCEDURE — 84443 ASSAY THYROID STIM HORMONE: CPT | Performed by: NURSE PRACTITIONER

## 2024-01-17 PROCEDURE — 82607 VITAMIN B-12: CPT | Performed by: NURSE PRACTITIONER

## 2024-01-17 PROCEDURE — 84466 ASSAY OF TRANSFERRIN: CPT | Performed by: NURSE PRACTITIONER

## 2024-01-17 PROCEDURE — 82728 ASSAY OF FERRITIN: CPT | Performed by: NURSE PRACTITIONER

## 2024-01-17 PROCEDURE — 82306 VITAMIN D 25 HYDROXY: CPT | Performed by: NURSE PRACTITIONER

## 2024-01-17 PROCEDURE — 82746 ASSAY OF FOLIC ACID SERUM: CPT | Performed by: NURSE PRACTITIONER

## 2024-01-17 PROCEDURE — 80053 COMPREHEN METABOLIC PANEL: CPT | Performed by: NURSE PRACTITIONER

## 2024-01-17 PROCEDURE — 83540 ASSAY OF IRON: CPT | Performed by: NURSE PRACTITIONER

## 2024-01-17 PROCEDURE — 84439 ASSAY OF FREE THYROXINE: CPT | Performed by: NURSE PRACTITIONER

## 2024-01-17 RX ORDER — NICOTINE 21 MG/24HR
1 PATCH, TRANSDERMAL 24 HOURS TRANSDERMAL EVERY 24 HOURS
Qty: 14 PATCH | Refills: 0 | Status: SHIPPED | OUTPATIENT
Start: 2024-02-14

## 2024-01-17 RX ORDER — NICOTINE 21 MG/24HR
1 PATCH, TRANSDERMAL 24 HOURS TRANSDERMAL EVERY 24 HOURS
Qty: 28 PATCH | Refills: 0 | Status: SHIPPED | OUTPATIENT
Start: 2024-01-17

## 2024-01-17 RX ORDER — LEVONORGESTREL 52 MG/1
1 INTRAUTERINE DEVICE INTRAUTERINE
COMMUNITY

## 2024-01-17 NOTE — PROGRESS NOTES
Chief Complaint  Annual Exam      History of Present Illness  Laxmi Jc is a 43 y.o. female who presents to [facility] with a past medical history of    Past Medical History:   Diagnosis Date    Adhesive capsulitis of left shoulder     Anxiety     HX OF NO CURRENT ISSUES AND NOT ON ANY MEDICATIONS    Low back pain     HAS HAD 1 EPISODE OF LOWER BACK PAIN HAD SPRAINED IT. NO CURRENT ISSUES    Migraines     Palpitations     DENIES CP/SOA. REPORTS STARTED POST COVID.  FOLLOWED BY DR GUADARRAMA YEARLY. ACTIVE    Pituitary adenoma     FOLLOWED BY DR ESTEBAN NEUROLOG[facility]    Polymenorrhea     PT REPORTS HAS PERIODS ABOUT EVERY 2 WEEKS    Scleroderma     nonreactive    Tear of left rotator cuff       Laxmi presents to the office today for annual physical exam -    Last pap: approx. 2020  Menses: she does have menses despite IUD - she had the Mirena placed to help with menses. The first Mirena she did not have any periods. But with the second one she broke out in hives after placement, and her periods have been irregular. She will have bleeding for 10-13 days, then be off from menses, then back on after a few days.   Contraception: Mirena IUD    Last mammogram: 2021 - benign - no current breast complaints.   Family history of breast cancer: none    Last colonoscopy: Never    She also has several concerns that she wants to address today -     She has been diagnosed with CREST syndrome. She is seeing rheumatology, and rheumatology referred her to GI d/t chronic diarrhea. GI has her doing a stool sample and then likely colonoscopy in the near future. She recently had a lot of labs, including Celiac. She has also underwent 'lung tests' - those came back good. She believes that she has markers for Crest, but not active disease.     She states that with the CT chest they did find incidental kidney stones - 3 mm intracalyceal calculus in the mid right kidney and 2 mm upper pole calculus on the left.  "She does have a history of kidney stones and had to have one surgically removed around May 2019 - at [facility], Dr. Us. She is not currently having symptoms of kidney stone such as LUTS, lower back pain, or abdominal pain.    She is concerned with hair loss - she states that she has been losing her hair for a while now - notes thinning and increased shedding with clump loss. She does not have any bald patches, but she can feel how empty her head is. She saw a dermatologist for this in [facility] and they 'looked at me and said your hair is fine'. She has itching and flaking, and states at time it feels like there are scabs. She has betamethasone lotion that she will use and it will make it go away, but it comes right back and it does not stop the hair loss.     She had surgery on the left bicep - at first she thought the pain in the right arm was related to overuse while recovering - now she does not feel that is the case. She had the surgery 5-6 months ago. Now she is having pain in the back of the shoulder. She states it started with a small pinching at the top when she would raise her arm up, but now it is worse such that she cannot sleep without pain - it is a sharp, shooting pain. She has tried going easy on it, resting, but now it is getting worse.     She was seen several times for an ear infection - three times in the this office and twice by ENT - she was treated for recurrent ear infections, and then ENT told her she had 'dry ears'. She was still having issues with her ears draining - would wake up with drainage on the pillow case, or it would run down her face, and she knew her ears were not 'dry'. She has a friend on [place] in medicine and they suggested it was fungal and that she use an OTC fungal cream and symptoms have resolved.       Objective   Vital Signs:   Vitals:    01/17/24 0845   BP: 120/80   Pulse: 74   SpO2: 99%   Weight: 69.9 kg (154 lb)   Height: 157.5 cm (62\")     Body mass index is 28.17 " kg/m².    Wt Readings from Last 3 Encounters:   01/17/24 69.9 kg (154 lb)   12/05/23 66.8 kg (147 lb 3.2 oz)   11/14/23 66.5 kg (146 lb 9.7 oz)     BP Readings from Last 3 Encounters:   01/17/24 120/80   11/14/23 126/82   10/06/23 100/68       Health Maintenance   Topic Date Due    PAP SMEAR  Never done    COVID-19 Vaccine (3 - 2023-24 season) 09/01/2023    BMI FOLLOWUP  08/21/2024    ANNUAL PHYSICAL  01/17/2025    TDAP/TD VACCINES (2 - Td or Tdap) 07/20/2028    HEPATITIS C SCREENING  Completed    Pneumococcal Vaccine 0-64  Completed    INFLUENZA VACCINE  Completed       Physical Exam  Vitals reviewed.   Constitutional:       General: She is not in acute distress.     Appearance: She is well-developed and overweight.   HENT:      Head: Normocephalic and atraumatic. Hair is abnormal (hair thinning noted- diffuse).      Right Ear: Tympanic membrane, ear canal and external ear normal.      Left Ear: Tympanic membrane, ear canal and external ear normal.      Nose: Nose normal.      Mouth/Throat:      Mouth: Mucous membranes are moist.      Pharynx: Oropharynx is clear. No oropharyngeal exudate or posterior oropharyngeal erythema.   Eyes:      General: No scleral icterus.        Right eye: No discharge.         Left eye: No discharge.      Extraocular Movements: Extraocular movements intact.      Conjunctiva/sclera: Conjunctivae normal.      Pupils: Pupils are equal, round, and reactive to light.   Neck:      Thyroid: No thyroid mass, thyromegaly or thyroid tenderness.      Trachea: Trachea normal.   Cardiovascular:      Rate and Rhythm: Normal rate and regular rhythm.      Pulses: Normal pulses.      Heart sounds: No murmur heard.  Pulmonary:      Effort: Pulmonary effort is normal. No respiratory distress.      Breath sounds: Normal breath sounds. No wheezing, rhonchi or rales.   Abdominal:      General: Bowel sounds are normal. There is no distension.      Palpations: Abdomen is soft. There is no mass.       Tenderness: There is no abdominal tenderness.   Musculoskeletal:         General: Normal range of motion.      Cervical back: Normal range of motion and neck supple. No tenderness.      Right lower leg: No edema.      Left lower leg: No edema.   Lymphadenopathy:      Cervical: No cervical adenopathy.   Skin:     General: Skin is warm and dry.   Neurological:      Mental Status: She is alert and oriented to person, place, and time.   Psychiatric:         Mood and Affect: Mood and affect normal.         Behavior: Behavior normal.         Thought Content: Thought content normal.         Judgment: Judgment normal.         Result Review :  The following data was reviewed by: ISAAC Hickey on 01/17/2024:    No visits with results within 1 Month(s) from this visit.   Latest known visit with results is:   Office Visit on 10/03/2023   Component Date Value    Color, UA 10/03/2023 Yellow     Appearance, UA 10/03/2023 Turbid (A)     pH, UA 10/03/2023 5.5     Specific Gravity, UA 10/03/2023 1.025     Glucose, UA 10/03/2023 Negative     Ketones, UA 10/03/2023 Negative     Bilirubin, UA 10/03/2023 Negative     Blood, UA 10/03/2023 Moderate (2+) (A)     Protein, UA 10/03/2023 Trace (A)     Leuk Esterase, UA 10/03/2023 Negative     Nitrite, UA 10/03/2023 Negative     Urobilinogen, UA 10/03/2023 0.2 E.U./dL     Glucose 10/03/2023 69     BUN 10/03/2023 15     Creatinine 10/03/2023 0.72     Sodium 10/03/2023 139     Potassium 10/03/2023 3.8     Chloride 10/03/2023 109 (H)     CO2 10/03/2023 18.9 (L)     Calcium 10/03/2023 9.5     BUN/Creatinine Ratio 10/03/2023 20.8     Anion Gap 10/03/2023 11.1     eGFR 10/03/2023 107.2     TSH 10/03/2023 1.490     Magnesium 10/03/2023 2.4     Extra Tube 10/03/2023 Hold for add-ons.     RBC, UA 10/03/2023 3-5 (A)     WBC, UA 10/03/2023 6-12 (A)     Bacteria, UA 10/03/2023 None Seen     Squamous Epithelial Cell* 10/03/2023 7-12 (A)     Hyaline Casts, UA 10/03/2023 0-2     Methodology  10/03/2023 Automated Microscopy     Urine Culture 10/03/2023 No growth      Mammo Screening Digital Tomosynthesis Bilateral With CAD (11/23/2021 16:49)     CT High Resolution Chest WO Contrast (12/26/2023 12:09)     XR Shoulder 2+ View Right (In Office)    Result Date: 1/17/2024   Unremarkable shoulder radiographs except for a small linear density overlying the soft tissues above the greater tuberosity, only seen on external rotation view.  This may be external to the patient, although densities in this region can sometimes represent rotator cuff calcific tendinopathy.      JETHRO ANNA MD       Electronically Signed and Approved By: JETHRO ANNA MD on 1/17/2024 at 9:58               Procedures       Assessment and Plan   Diagnoses and all orders for this visit:    1. Annual physical exam (Primary)  -     CBC Auto Differential  -     Comprehensive Metabolic Panel  -     Lipid Panel  -     TSH+Free T4    2. Need for influenza vaccination  -     Fluzone (or Fluarix & Flulaval for VFC) >6 Mos ([id number])    3. Need for pneumococcal 20-valent conjugate vaccination  -     Pneumococcal Conjugate Vaccine 20-Valent (PCV20)    4. Breast cancer screening by mammogram  -     Mammo Screening Digital Tomosynthesis Bilateral With CAD; Future    5. Bilateral kidney stones    6. History of kidney stones    7. Hair loss disorder  -     CBC Auto Differential  -     Comprehensive Metabolic Panel  -     TSH+Free T4  -     Iron Profile  -     Ferritin  -     Vitamin B12 & Folate  -     Vitamin D,25-Hydroxy  -     Testosterone  -     Dihydrotestosterone  -     Testosterone, Free, Total  -     DHEA-Sulfate  -     Ambulatory Referral to Dermatology    8. Chronic right shoulder pain  -     XR Shoulder 2+ View Right (In Office)  -     MRI Shoulder Right Without Contrast; Future    9. Cigarette nicotine dependence with other nicotine-induced disorder  -     nicotine (Nicoderm CQ) 21 MG/24HR patch; Place 1 patch on the skin as directed by  provider Daily.  Dispense: 28 patch; Refill: 0  -     nicotine (Nicoderm CQ) 14 MG/24HR patch; Place 1 patch on the skin as directed by provider Daily.  Dispense: 14 patch; Refill: 0  -     nicotine (Nicoderm CQ) 7 MG/24HR patch; Place 1 patch on the skin as directed by provider Daily.  Dispense: 14 patch; Refill: 0          FOLLOW UP  Return for follow up pending outcome of labs.    Annual physical exam encouraged.  She is not up-to-date on her Pap smear.  She is having issues with Mirena IUD.  Recommend that she follow-up with gynecology to discuss.  I will order a mammogram to get her up-to-date on breast cancer screening.  She is not yet age-appropriate for colorectal cancer screening, but it does appear that she would likely have colonoscopy in the future due to workup for crest syndrome.  Vaccination update provided today with influenza and Prevnar 20.    In regards to her other concerns, patient advised that her kidney stones are nonobstructing at this time, and she is asymptomatic.  If she develops symptoms then we can reassess and get her back in with urology.  In regards to the hair loss disorder, I will evaluate with extensive labs, and refer to dermatology to reassess.  Uncertain if hair loss disorder is related to history of COVID-19, current medications-Topamax and/or Mirena IUD, or other etiology.  She denies any family history of female pattern baldness.    Right shoulder x-ray today was unremarkable with the exception of a small linear density overlying the soft tissues above the greater tuberosity.  This was only seen on external rotation.  According to the radiology technician there were no external objects at the time of x-ray.  Per radiology, sometimes densities in this region can suggest rotator cuff calcific tendinopathy.  Because of her symptoms we will proceed with MRI.    She also states that she wants to attempt to quit smoking again.  I will give her patches and she will start when she is  ready.     Patient was given instructions and counseling regarding her condition or for health maintenance advice. Please see specific information pulled into the AVS if appropriate.       Misa Chavez, APRN  01/17/24  10:17 EST    CURRENT & DISCONTINUED MEDICATIONS  Current Outpatient Medications   Medication Instructions    cetirizine (ZYRTEC) 10 mg, Oral, Daily    ibuprofen (ADVIL,MOTRIN) 800 mg, Oral, Every 8 Hours PRN    Levonorgestrel (Mirena, 52 MG,) 20 MCG/DAY intrauterine device IUD 1 each, Intrauterine    metoprolol succinate XL (TOPROL-XL) 25 mg, Oral, Daily    [START ON 2/14/2024] nicotine (Nicoderm CQ) 14 MG/24HR patch 1 patch, Transdermal, Every 24 Hours    nicotine (Nicoderm CQ) 21 MG/24HR patch 1 patch, Transdermal, Every 24 Hours    [START ON 2/28/2024] nicotine (Nicoderm CQ) 7 MG/24HR patch 1 patch, Transdermal, Every 24 Hours    Norditropin FlexPro 0.25 mg, Subcutaneous, REPORTS THAT TAKES NORMALLY MONDAY THROUGH FRIDAY BUT HER LAST DOSE WAS OVER 2 WEEKS STOPPED ON OWN DIDN'T KNOW IF WOULD INTERFERE WITH HER SURGERY    topiramate (TOPAMAX) 100 mg, Oral, 2 Times Daily       Medications Discontinued During This Encounter   Medication Reason    naloxone (NARCAN) 4 MG/0.1ML nasal spray *Therapy completed    oxyCODONE-acetaminophen (PERCOCET) 7.5-325 MG per tablet *Therapy completed    azithromycin (Zithromax Z-Sonny) 250 MG tablet *Therapy completed

## 2024-01-18 DIAGNOSIS — E55.9 VITAMIN D DEFICIENCY: Primary | ICD-10-CM

## 2024-01-18 RX ORDER — CHOLECALCIFEROL (VITAMIN D3) 1250 MCG
50000 CAPSULE ORAL
Qty: 13 CAPSULE | Refills: 0 | Status: SHIPPED | OUTPATIENT
Start: 2024-01-18

## 2024-01-25 ENCOUNTER — PATIENT MESSAGE (OUTPATIENT)
Dept: FAMILY MEDICINE CLINIC | Facility: CLINIC | Age: 44
End: 2024-01-25
Payer: OTHER GOVERNMENT

## 2024-01-25 DIAGNOSIS — E55.9 VITAMIN D DEFICIENCY: Primary | ICD-10-CM

## 2024-01-26 RX ORDER — ERGOCALCIFEROL 1.25 MG/1
50000 CAPSULE ORAL WEEKLY
Qty: 13 CAPSULE | Refills: 0 | Status: SHIPPED | OUTPATIENT
Start: 2024-01-26

## 2024-01-26 NOTE — TELEPHONE ENCOUNTER
From: Laxmi Jc  To: Misa Chavez  Sent: 1/25/2024 4:20 PM EST  Subject: Vitamin D b3    Hello. You sent in a prescription for 50,000 iu of Vitamin D b3, however my insurance does not cover the b3. They said you can send in a prescription for vitamin D b2 and they will cover it. Or I can just buy the Vitamin D b3 over the counter if there’s a specific reason you want me to stick with b3. I’m fine with either. If b3 will work better for me then I can just buy it. Please let me know which one you would prefer I have.       Thank you!  Laxmi Jc

## 2024-02-12 ENCOUNTER — CLINICAL SUPPORT (OUTPATIENT)
Dept: FAMILY MEDICINE CLINIC | Facility: CLINIC | Age: 44
End: 2024-02-12
Payer: OTHER GOVERNMENT

## 2024-02-12 ENCOUNTER — HOSPITAL ENCOUNTER (OUTPATIENT)
Dept: MAMMOGRAPHY | Facility: HOSPITAL | Age: 44
Discharge: HOME | End: 2024-02-12
Payer: OTHER GOVERNMENT

## 2024-02-12 ENCOUNTER — HOSPITAL ENCOUNTER (OUTPATIENT)
Dept: MRI IMAGING | Facility: HOSPITAL | Age: 44
Discharge: HOME | End: 2024-02-12
Payer: OTHER GOVERNMENT

## 2024-02-12 DIAGNOSIS — G89.29 CHRONIC RIGHT SHOULDER PAIN: ICD-10-CM

## 2024-02-12 DIAGNOSIS — L65.9 HAIR LOSS DISORDER: Primary | ICD-10-CM

## 2024-02-12 DIAGNOSIS — M25.511 CHRONIC RIGHT SHOULDER PAIN: ICD-10-CM

## 2024-02-12 DIAGNOSIS — Z12.31 BREAST CANCER SCREENING BY MAMMOGRAM: ICD-10-CM

## 2024-02-12 PROCEDURE — 36415 COLL VENOUS BLD VENIPUNCTURE: CPT | Performed by: NURSE PRACTITIONER

## 2024-02-12 PROCEDURE — 77067 SCR MAMMO BI INCL CAD: CPT

## 2024-02-12 PROCEDURE — 77063 BREAST TOMOSYNTHESIS BI: CPT

## 2024-02-12 PROCEDURE — 82627 DEHYDROEPIANDROSTERONE: CPT | Performed by: NURSE PRACTITIONER

## 2024-02-12 PROCEDURE — 73221 MRI JOINT UPR EXTREM W/O DYE: CPT

## 2024-02-13 DIAGNOSIS — M25.511 CHRONIC RIGHT SHOULDER PAIN: Primary | ICD-10-CM

## 2024-02-13 DIAGNOSIS — R93.6 ABNORMAL MRI, SHOULDER: ICD-10-CM

## 2024-02-13 DIAGNOSIS — G89.29 CHRONIC RIGHT SHOULDER PAIN: Primary | ICD-10-CM

## 2024-02-14 LAB — DHEA-S SERPL-MCNC: 143 UG/DL (ref 57.3–279.2)

## 2024-02-15 ENCOUNTER — OFFICE VISIT (OUTPATIENT)
Dept: ORTHOPEDIC SURGERY | Facility: CLINIC | Age: 44
End: 2024-02-15
Payer: OTHER GOVERNMENT

## 2024-02-15 VITALS — HEIGHT: 62 IN | OXYGEN SATURATION: 100 % | HEART RATE: 72 BPM | BODY MASS INDEX: 26.5 KG/M2 | WEIGHT: 144 LBS

## 2024-02-15 DIAGNOSIS — M25.811 IMPINGEMENT OF RIGHT SHOULDER: ICD-10-CM

## 2024-02-15 DIAGNOSIS — S43.431A SUPERIOR GLENOID LABRUM LESION OF RIGHT SHOULDER, INITIAL ENCOUNTER: Primary | ICD-10-CM

## 2024-02-15 RX ORDER — LIDOCAINE HYDROCHLORIDE 10 MG/ML
5 INJECTION, SOLUTION INFILTRATION; PERINEURAL
Status: COMPLETED | OUTPATIENT
Start: 2024-02-15 | End: 2024-02-15

## 2024-02-15 RX ORDER — TRIAMCINOLONE ACETONIDE 40 MG/ML
40 INJECTION, SUSPENSION INTRA-ARTICULAR; INTRAMUSCULAR
Status: COMPLETED | OUTPATIENT
Start: 2024-02-15 | End: 2024-02-15

## 2024-02-15 RX ADMIN — TRIAMCINOLONE ACETONIDE 40 MG: 40 INJECTION, SUSPENSION INTRA-ARTICULAR; INTRAMUSCULAR at 11:17

## 2024-02-15 RX ADMIN — LIDOCAINE HYDROCHLORIDE 5 ML: 10 INJECTION, SOLUTION INFILTRATION; PERINEURAL at 11:17

## 2024-03-05 ENCOUNTER — TELEPHONE (OUTPATIENT)
Dept: FAMILY MEDICINE CLINIC | Facility: CLINIC | Age: 44
End: 2024-03-05
Payer: OTHER GOVERNMENT

## 2024-03-05 DIAGNOSIS — N92.6 IRREGULAR MENSES: ICD-10-CM

## 2024-03-05 DIAGNOSIS — D35.2 PITUITARY ADENOMA: Primary | ICD-10-CM

## 2024-03-05 NOTE — TELEPHONE ENCOUNTER
Patient requesting referrals for GYN for ablation and for Dr. Vaughan. Does she need to make appt?

## 2024-03-08 ENCOUNTER — OFFICE VISIT (OUTPATIENT)
Dept: CARDIOLOGY | Facility: CLINIC | Age: 44
End: 2024-03-08
Payer: OTHER GOVERNMENT

## 2024-03-08 VITALS
HEIGHT: 62 IN | WEIGHT: 148 LBS | SYSTOLIC BLOOD PRESSURE: 119 MMHG | DIASTOLIC BLOOD PRESSURE: 76 MMHG | BODY MASS INDEX: 27.23 KG/M2 | HEART RATE: 75 BPM

## 2024-03-08 DIAGNOSIS — R00.2 PALPITATIONS: Primary | ICD-10-CM

## 2024-03-08 PROCEDURE — 99213 OFFICE O/P EST LOW 20 MIN: CPT | Performed by: FAMILY MEDICINE

## 2024-03-08 RX ORDER — POLYETHYLENE GLYCOL 3350, SODIUM SULFATE ANHYDROUS, SODIUM BICARBONATE, SODIUM CHLORIDE, POTASSIUM CHLORIDE 236; 22.74; 6.74; 5.86; 2.97 G/4L; G/4L; G/4L; G/4L; G/4L
POWDER, FOR SOLUTION ORAL
COMMUNITY
Start: 2024-02-01

## 2024-03-08 NOTE — ASSESSMENT & PLAN NOTE
She had a previous Holter monitor study which showed episodes of sinus tachycardia, and a 5 beat run of supraventricular ectopy.  She has good symptom control with current dose of metoprolol, will continue the same metoprolol succinate 25 mg daily.

## 2024-03-08 NOTE — PROGRESS NOTES
Chief Complaint  Palpitations and Follow-up    Subjective        History of Present Illness  Laxmi Jc presents to [facility]   Ms. Jc is a 43-year-old female patient coming in for routine follow-up for palpitations.  Currently not having any significant episodes of palpitations and doing well with current dose of metoprolol.  Has no complaints of chest pains, lightheadedness dizziness or shortness of breath.  She is trying to work on smoking cessation, recently given prescription for nicotine patches by another provider.   She has upcoming colonoscopy scheduled due to chronic episodes of diarrhea.  Otherwise no significant changes since last seen in office.          Past History:     1) Pituitary adenoma; 2) Unspecified autoimmune disorder; 3) Depression; 4) Negative for diabetes mellitus, hypertension, and hyperlipidemia. 5) overactive bladder      Past Medical History:   Diagnosis Date    Adhesive capsulitis of left shoulder     Anxiety     Low back pain     Migraines     Palpitations     Pituitary adenoma     Polymenorrhea     Scleroderma     Tear of left rotator cuff        Allergies   Allergen Reactions    Floxin Otic [Ofloxacin] Other (See Comments)     Swelling of ear canal     Penicillins Rash and Other (See Comments)    Sulfa Antibiotics Rash and Other (See Comments)    Sulfamethoxazole-Trimethoprim Rash        Past Surgical History:   Procedure Laterality Date    BREAST BIOPSY Left     FAT GRAFTING Left     REPAIR OF EXCISIONAL BX LEFT    KIDNEY STONE SURGERY      OVARIAN CYST REMOVAL      SHOULDER ARTHROSCOPY W/ ROTATOR CUFF REPAIR Left 08/21/2023    Procedure: LEFT SHOULDER MANIPULATION,  ARTHROSCOPY ROTATOR CUFF DEBRIDEMENT AND SUBACROMIAL DECOMPRESSION , labral debridement, subpectoral biceps tenodesis;  Surgeon: Jv Norton MD;  Location: [facility];  Service: Orthopedics;  Laterality: Left;        Social History  She  reports that she has been  smoking cigarettes. She started smoking about 6 years ago. She has a 3.1 pack-year smoking history. She has been exposed to tobacco smoke. She has never used smokeless tobacco. She reports that she does not drink alcohol and does not use drugs.    Family History  Her family history includes Autoimmune disease in her mother; Bleeding Disorder in her mother; Cancer in her maternal grandmother and mother; Diabetes in her father; Hepatitis in her mother; Hyperlipidemia in her father; Hypertension in her father; Kidney disease in her mother; Stroke in her father.       Current Outpatient Medications on File Prior to Visit   Medication Sig    cetirizine (zyrTEC) 10 MG tablet Take 1 tablet by mouth Daily.    Golytely 236 g solution     ibuprofen (ADVIL,MOTRIN) 800 MG tablet Take 1 tablet by mouth Every 8 (Eight) Hours As Needed for Mild Pain.    Levonorgestrel (Mirena, 52 MG,) 20 MCG/DAY intrauterine device IUD 1 each by Intrauterine route. Indications: Birth Control Treatment    metoprolol succinate XL (TOPROL-XL) 25 MG 24 hr tablet TAKE 1 TABLET DAILY    nicotine (Nicoderm CQ) 14 MG/24HR patch Place 1 patch on the skin as directed by provider Daily.    nicotine (Nicoderm CQ) 21 MG/24HR patch Place 1 patch on the skin as directed by provider Daily.    nicotine (Nicoderm CQ) 7 MG/24HR patch Place 1 patch on the skin as directed by provider Daily.    Norditropin FlexPro 5 MG/1.5ML solution pen-injector Inject 0.25 mg under the skin into the appropriate area as directed. REPORTS THAT TAKES NORMALLY MONDAY THROUGH FRIDAY BUT HER LAST DOSE WAS OVER 2 WEEKS STOPPED ON OWN DIDN'T KNOW IF WOULD INTERFERE WITH HER SURGERY    topiramate (TOPAMAX) 100 MG tablet Take 1 tablet by mouth 2 (Two) Times a Day.    vitamin D (ERGOCALCIFEROL) 1.25 MG (35073 UT) capsule capsule Take 1 capsule by mouth 1 (One) Time Per Week.     No current facility-administered medications on file prior to visit.         Review of Systems   Constitutional:   "Negative for fatigue.   Respiratory:  Negative for cough, chest tightness and shortness of breath.    Cardiovascular:  Negative for chest pain, palpitations and leg swelling.   Gastrointestinal:  Positive for diarrhea. Negative for nausea and vomiting.   Neurological:  Negative for dizziness and syncope.        Objective   Vitals:    03/08/24 1542   BP: 119/76   Pulse: 75   Weight: 67.1 kg (148 lb)   Height: 157.5 cm (62\")         Physical Exam  General : Alert, awake, no acute distress  Neck : Supple, no carotid bruit, no jugular venous distention  CVS : Regular rate and rhythm, no murmur, rubs or gallops  Lungs: Clear to auscultation bilaterally, no crackles or rhonchi  Abdomen: Soft, nontender, bowel sounds active  Extremities: Warm, well-perfused, no pedal edema      Result Review     The following data was reviewed by ISAAC Jean  No results found for: \"PROBNP\"  CMP          10/3/2023    10:51 1/17/2024    09:36   CMP   Glucose 69  89    BUN 15  14    Creatinine 0.72  0.64    EGFR 107.2  112.6    Sodium 139  136    Potassium 3.8  3.9    Chloride 109  101    Calcium 9.5  9.1    Total Protein  7.1    Albumin  4.2    Globulin  2.9    Total Bilirubin  <0.2    Alkaline Phosphatase  74    AST (SGOT)  12    ALT (SGPT)  11    Albumin/Globulin Ratio  1.4    BUN/Creatinine Ratio 20.8  21.9    Anion Gap 11.1  11.6      CBC w/diff          10/24/2023    15:30 1/17/2024    09:36   CBC w/Diff   WBC 7.15     7.08    RBC 4.40     4.12    Hemoglobin 13.9     13.3    Hematocrit 42.0     38.5    MCV 95.5     93.4    MCH 31.6     32.3    MCHC 33.1     34.5    RDW 13.5     12.4    Platelets 341     336    Neutrophil Rel % 54.7     60.2    Immature Granulocyte Rel % 0.3     0.3    Lymphocyte Rel % 30.3     23.3    Monocyte Rel % 7.1     6.2    Eosinophil Rel % 6.6     8.6    Basophil Rel % 1.0     1.4       Details          This result is from an external source.              Lab Results   Component Value Date    TSH 1.220 " "01/17/2024      Lab Results   Component Value Date    FREET4 1.00 01/17/2024      No results found for: \"DDIMERQUANT\"  Magnesium   Date Value Ref Range Status   10/03/2023 2.4 1.6 - 2.6 mg/dL Final      No results found for: \"DIGOXIN\"   Lab Results   Component Value Date    TROPONINT <0.01 03/03/2021           Lipid Panel          1/17/2024    09:36   Lipid Panel   Total Cholesterol 179    Triglycerides 51    HDL Cholesterol 53    VLDL Cholesterol 10    LDL Cholesterol  116    LDL/HDL Ratio 2.18        Echocardiogram from 12/9/2020    CONCLUSION:  1.  Normal left ventricular systolic function with a calculated LV ejection fraction of 66%.   2.  Normal diastolic function of the left ventricle.   3.  No hemodynamically significant valvular abnormalities.       Holter Monitor  from 12/9/2020     CONCLUSION:  Forty-eight Holter monitor study showing sinus rhythm and a 5-beat run of supraventricular ectopy. There was no correlation of patient symptoms to any arrhythmias.          Assessment and Plan   Diagnoses and all orders for this visit:    1. Palpitations (Primary)  Assessment & Plan:  She had a previous Holter monitor study which showed episodes of sinus tachycardia, and a 5 beat run of supraventricular ectopy.  She has good symptom control with current dose of metoprolol, will continue the same metoprolol succinate 25 mg daily.              Follow Up   Return in about 1 year (around 3/8/2025) for with Dr. Spann.    Patient was given instructions and counseling regarding her condition or for health maintenance advice. Please see specific information pulled into the AVS if appropriate.     Signed,  Delphine Mejia, APRN  03/08/2024     Dictated Utilizing Dragon Dictation: Please note that portions of this note were completed with a voice recognition program.  Part of this note may be an electronic transcription/translation of spoken language to printed text using the Dragon Dictation System.    "

## 2024-03-28 ENCOUNTER — OFFICE VISIT (OUTPATIENT)
Dept: ORTHOPEDIC SURGERY | Facility: CLINIC | Age: 44
End: 2024-03-28
Payer: OTHER GOVERNMENT

## 2024-03-28 VITALS
HEIGHT: 62 IN | WEIGHT: 147.93 LBS | HEART RATE: 75 BPM | DIASTOLIC BLOOD PRESSURE: 82 MMHG | BODY MASS INDEX: 27.22 KG/M2 | OXYGEN SATURATION: 97 % | SYSTOLIC BLOOD PRESSURE: 122 MMHG

## 2024-03-28 DIAGNOSIS — S43.431D SUPERIOR GLENOID LABRUM LESION OF RIGHT SHOULDER, SUBSEQUENT ENCOUNTER: Primary | ICD-10-CM

## 2024-03-28 DIAGNOSIS — M25.811 IMPINGEMENT OF RIGHT SHOULDER: ICD-10-CM

## 2024-03-28 PROBLEM — S43.431A SUPERIOR GLENOID LABRUM LESION OF RIGHT SHOULDER: Status: ACTIVE | Noted: 2024-03-28

## 2024-03-28 RX ORDER — TRIAMCINOLONE ACETONIDE 40 MG/ML
40 INJECTION, SUSPENSION INTRA-ARTICULAR; INTRAMUSCULAR
Status: COMPLETED | OUTPATIENT
Start: 2024-03-28 | End: 2024-03-28

## 2024-03-28 RX ORDER — LIDOCAINE HYDROCHLORIDE 10 MG/ML
5 INJECTION, SOLUTION EPIDURAL; INFILTRATION; INTRACAUDAL; PERINEURAL
Status: COMPLETED | OUTPATIENT
Start: 2024-03-28 | End: 2024-03-28

## 2024-03-28 RX ADMIN — LIDOCAINE HYDROCHLORIDE 5 ML: 10 INJECTION, SOLUTION EPIDURAL; INFILTRATION; INTRACAUDAL; PERINEURAL at 14:16

## 2024-03-28 RX ADMIN — TRIAMCINOLONE ACETONIDE 40 MG: 40 INJECTION, SUSPENSION INTRA-ARTICULAR; INTRAMUSCULAR at 14:16

## 2024-03-28 NOTE — PROGRESS NOTES
Chief Complaint  Pain and Follow-up of the Right Shoulder    Subjective          History of Present Illness      Laxmi Jc is a 43 y.o. female  presents to [facility] for     Patient presents for follow-up evaluation of right shoulder pain right shoulder SLAP tear and impingement.  She saw Dr. Norton at last visit reviewed her MRI and she received a steroid injection with order for home exercises.  She states the shoulder injection did not provide much relief.  She has been doing home exercises and states this has been helping her range of motion.  She points anterior lateral shoulder as her areas of pain the pain radiates down her arm.  She states it is tough to lift objects including something like her water cup she also states it hurts to reach to her side or behind her back with weakness and pain.  She states she knows surgery could be beneficial now is not the time for surgery she is helping her son rehabilitate from a surgery he had recently.  She states pain is similar to prior visits.      Allergies   Allergen Reactions   • Floxin Otic [Ofloxacin] Other (See Comments)     Swelling of ear canal    • Penicillins Rash and Other (See Comments)   • Sulfa Antibiotics Rash and Other (See Comments)   • Sulfamethoxazole-Trimethoprim Rash        Social History     Socioeconomic History   • Marital status:    Tobacco Use   • Smoking status: Every Day     Current packs/day: 0.50     Average packs/day: 0.5 packs/day for 6.2 years (3.1 ttl pk-yrs)     Types: Cigarettes     Start date: 2018     Passive exposure: Current   • Smokeless tobacco: Never   • Tobacco comments:     Has smoked this 8-21-23   Vaping Use   • Vaping status: Never Used   Substance and Sexual Activity   • Alcohol use: Never   • Drug use: Never   • Sexual activity: Defer        REVIEW OF SYSTEMS    Constitutional: Awake alert and oriented x3, no acute distress, denies fevers, chills, weight  "loss  Respiratory: No respiratory distress  Vascular: Brisk cap refill, Intact distal pulses, No cyanosis, compartments soft with no signs or symptoms of compartment syndrome or DVT.   Cardiovascular: Denies chest pain, shortness of breath  Skin: Denies rashes, acute skin changes  Neurologic: Denies headache, loss of consciousness  MSK: Right shoulder pain      Objective   Vital Signs:   /82   Pulse 75   Ht 157.5 cm (62\")   Wt 67.1 kg (147 lb 14.9 oz)   SpO2 97%   BMI 27.06 kg/m²     Body mass index is 27.06 kg/m².    Physical Exam       Right shoulder- Sensation to light touch median, radial, ulnar nerve. Positive AIN, PIN, ulnar nerve motor function. Positive pulses. Negative [place]'kay.  5/5 rotator cuff testing. Non-tender. No skin discoloration, atrophy or swelling. Forward elevation 160 with pain. Abduction 90. External Rotation 80. Internal rotation 70. Positive impingement. External Rotation at the side 50. Internal rotation to the buttock.        Result Review    MRI Shoulder Right Without Contrast     Result Date: 2/12/2024  Narrative: PROCEDURE:       MRI SHOULDER RIGHT WO CONTRAST  COMPARISON:         [facility], MR, MRI SHOULDER LEFT WO CONTRAST, 4/26/2023, 18:02.  [facility], CR, XR SHOULDER 2+ VW RIGHT, 1/17/2024, 9:29.  INDICATIONS:            chronic right shoulder pain        TECHNIQUE:           A variety of imaging planes and parameters were utilized for visualization of suspected pathology.  Images were performed without contrast.   FINDINGS:    No fracture or malalignment is identified.  Marrow signal appears normal.  A type 4 acromion is present.  The acromioclavicular joint appears within normal limits.  No AC joint effusion is noted.  There is moderate fatty atrophy of the teres minor muscle body.  No neural impinging lesion is identified within the quadrangular space.  The rotator cuff otherwise appears unremarkable.  No tendon tear or " tendinopathy is seen.  Fluid is noted surrounding the bicipital tendon within the bicipital groove, consistent with tenosynovitis.  There is a tear of the superior labrum extending anterior and posterior to the biceps anchor.  There is mild extension into the biceps long head tendon.  No paralabral cyst is identified.  The anterosuperior labrum is diminutive with a prominent middle glenohumeral ligament suggesting a [place] complex.  The labrum otherwise appears unremarkable.  A small glenohumeral joint effusion is noted.  No loose body is seen.  Cartilage in the glenohumeral joint appears intact.  There is no MR evidence of adhesive capsulitis.       Impression:      1. Slap tear of the superior labrum 2. Moderate fatty atrophy of the teres minor muscle body with no evidence of quadrangular space neural impingement 3. Small glenohumeral joint effusion      Glenn Barbour M.D.       Electronically Signed and Approved By: Glenn Barbour M.D. on 2/12/2024 at 15:50                  Large Joint Arthrocentesis: R subacromial bursa  Date/Time: 3/28/2024 2:16 PM  Consent given by: patient  Site marked: site marked  Timeout: Immediately prior to procedure a time out was called to verify the correct patient, procedure, equipment, support staff and site/side marked as required   Supporting Documentation  Indications: pain   Procedure Details  Location: shoulder - R subacromial bursa  Preparation: Patient was prepped and draped in the usual sterile fashion  Needle gauge: 21 G.  Approach: posterior  Medications administered: 5 mL lidocaine PF 1% 1 %; 40 mg triamcinolone acetonide 40 MG/ML  Patient tolerance: patient tolerated the procedure well with no immediate complications      Imaging Results (Most Recent)       None             Result Review :   The following data was reviewed by: LORAINE Mcintosh on 03/28/2024:               Assessment and Plan    Diagnoses and all orders for this visit:    1. Superior glenoid  labrum lesion of right shoulder, subsequent encounter (Primary)    2. Impingement of right shoulder        Discussed diagnosis and treatment options with the patient, we discussed surgical versus conservative treatment she would like to continue conservative treatment at this time, she would like to continue home exercises, she elected to have right shoulder steroid injection today which she tolerated well follow-up in 8 weeks may consider surgery discussion at that time    Discussed surgery., Risks/benefits discussed with patient including, but not limited to: infection, bleeding, neurovascular damage, re-rupture, aesthetic deformity, need for further surgery, and death., and Call or return if worsening symptoms.    Follow Up   Return in about 8 weeks (around 5/23/2024).  Patient was given instructions and counseling regarding her condition or for health maintenance advice. Please see specific information pulled into the AVS if appropriate.       EMR Dragon/Transcription disclaimer:  Much of this encounter note is an electronic transcription/translation of spoken language to printed text, aka voice recognition.  The electronic translation of spoken language may permit erroneous or at times nonsensical words or phrases to be inadvertently transcribed; although I have reviewed the note for such errors, some may still exist so please interpret based on surrounding text content.

## 2024-04-30 ENCOUNTER — PATIENT MESSAGE (OUTPATIENT)
Dept: FAMILY MEDICINE CLINIC | Facility: CLINIC | Age: 44
End: 2024-04-30
Payer: OTHER GOVERNMENT

## 2024-04-30 DIAGNOSIS — Z87.442 HISTORY OF KIDNEY STONES: ICD-10-CM

## 2024-04-30 DIAGNOSIS — R32 URINARY INCONTINENCE, UNSPECIFIED TYPE: ICD-10-CM

## 2024-04-30 NOTE — TELEPHONE ENCOUNTER
From: Laxmi Jc  To: Misa Chavez  Sent: 4/30/2024 1:39 PM EDT  Subject: Not sure if I see you or urologist     Andrea Bynum,  Not sure if I make an appt w you for this or if I need my referral to my urologist that I seen years ago for a kidney stone renewed? But… I’m leaking urine. I’ll literally just be standing there or sometimes sitting. Not even moving and drops of urine leak. If I do happen to move or sit, more urine leaks without the urge to pee. Usually like a rain drop to a small spurt enough to wet the outside of my jeans or run down my leg WITHOUT me applying any bladder pressure from coughing sneezing or bearing down. Pretty embarrassing. Please advise.     Thanks so much!    Laxmi Jc

## 2024-05-18 ENCOUNTER — HOSPITAL ENCOUNTER (EMERGENCY)
Facility: HOSPITAL | Age: 44
Discharge: HOME | End: 2024-05-18
Attending: EMERGENCY MEDICINE
Payer: OTHER GOVERNMENT

## 2024-05-18 VITALS
SYSTOLIC BLOOD PRESSURE: 101 MMHG | WEIGHT: 151.01 LBS | RESPIRATION RATE: 18 BRPM | TEMPERATURE: 98.7 F | HEART RATE: 61 BPM | DIASTOLIC BLOOD PRESSURE: 69 MMHG | HEIGHT: 62 IN | OXYGEN SATURATION: 97 % | BODY MASS INDEX: 27.79 KG/M2

## 2024-05-18 DIAGNOSIS — N93.9 ABNORMAL UTERINE BLEEDING (AUB): Primary | ICD-10-CM

## 2024-05-18 LAB
HCG INTACT+B SERPL-ACNC: <0.5 MIU/ML
HCT VFR BLD AUTO: 37.5 % (ref 34–46.6)
HCT VFR BLD AUTO: 37.5 % (ref 34–46.6)
HGB BLD-MCNC: 12.4 G/DL (ref 12–15.9)
HGB BLD-MCNC: 12.5 G/DL (ref 12–15.9)
HOLD SPECIMEN: NORMAL
HOLD SPECIMEN: NORMAL
WHOLE BLOOD HOLD COAG: NORMAL
WHOLE BLOOD HOLD SPECIMEN: NORMAL

## 2024-05-18 PROCEDURE — 99283 EMERGENCY DEPT VISIT LOW MDM: CPT

## 2024-05-18 PROCEDURE — 36415 COLL VENOUS BLD VENIPUNCTURE: CPT

## 2024-05-18 PROCEDURE — 85014 HEMATOCRIT: CPT | Performed by: NURSE PRACTITIONER

## 2024-05-18 PROCEDURE — 84702 CHORIONIC GONADOTROPIN TEST: CPT | Performed by: NURSE PRACTITIONER

## 2024-05-18 PROCEDURE — 85018 HEMOGLOBIN: CPT | Performed by: NURSE PRACTITIONER

## 2024-05-18 RX ORDER — LEVONORGESTREL/ETHIN.ESTRADIOL 0.1-0.02MG
1 TABLET ORAL DAILY
Qty: 28 TABLET | Refills: 0 | Status: SHIPPED | OUTPATIENT
Start: 2024-05-18 | End: 2024-06-15

## 2024-05-18 NOTE — Clinical Note
[facility] EMERGENCY ROOM  [address] [place] [address]  Phone: [phone]  Fax: [phone]    Laxmi Jc was seen and treated in our emergency department on 5/18/2024.  She may return to work on 05/21/2024.         Thank you for choosing [facility].    Ange Smith APRN

## 2024-05-18 NOTE — ED PROVIDER NOTES
"Time: 4:58 AM EDT  Date of encounter:  5/18/2024  Independent Historian/Clinical History and Information was obtained by:   Patient    History is limited by: N/A    Chief Complaint: HEAVY VAGINAL BLEEDING      History of Present Illness:      The patient presents to the emergency department and states that she had her IUD removed on Wednesday at her OB office in [place].  She states that she had placed several months ago because she was having excessive heavy bleeding.  She states she kept it for several months was still was having periods and breakthrough bleeding.  She states that this take it out.  I did ultrasound on Wednesday and told her that she did have a rather large fibroid but that everything else looked healthy.  She denies any abdominal pain.  She reports no pelvic pain.  She states that it just feels a little heavy but states that she been having significant amount of vaginal bleeding.  She states she is passing very large clots and when she stands up the blood just \"pours out of me\".  She denies any dizziness or syncope.  She denies any back pain.  She reports no vaginal discharge.      History provided by:  Patient   used: No        Patient Care Team  Primary Care Provider: Misa Chavez APRN    Past Medical History:     Allergies   Allergen Reactions    Floxin Otic [Ofloxacin] Other (See Comments)     Swelling of ear canal     Penicillins Rash and Other (See Comments)    Sulfa Antibiotics Rash and Other (See Comments)    Sulfamethoxazole-Trimethoprim Rash     Past Medical History:   Diagnosis Date    Adhesive capsulitis of left shoulder     Anxiety     HX OF NO CURRENT ISSUES AND NOT ON ANY MEDICATIONS    Low back pain     HAS HAD 1 EPISODE OF LOWER BACK PAIN HAD SPRAINED IT. NO CURRENT ISSUES    Migraines     Palpitations     DENIES CP/SOA. REPORTS STARTED POST COVID.  FOLLOWED BY DR GUADARRAMA YEARLY. ACTIVE    Pituitary adenoma     FOLLOWED BY DR [facility]    " Polymenorrhea     PT REPORTS HAS PERIODS ABOUT EVERY 2 WEEKS    Scleroderma     nonreactive    Tear of left rotator cuff      Past Surgical History:   Procedure Laterality Date    BREAST BIOPSY Left     FAT GRAFTING Left     REPAIR OF EXCISIONAL BX LEFT    KIDNEY STONE SURGERY      OVARIAN CYST REMOVAL      SHOULDER ARTHROSCOPY W/ ROTATOR CUFF REPAIR Left 08/21/2023    Procedure: LEFT SHOULDER MANIPULATION,  ARTHROSCOPY ROTATOR CUFF DEBRIDEMENT AND SUBACROMIAL DECOMPRESSION , labral debridement, subpectoral biceps tenodesis;  Surgeon: Jv Norton MD;  [facility]: [facility];  Service: Orthopedics;  Laterality: Left;     Family History   Problem Relation Age of Onset    Cancer Mother         Colon    Kidney disease Mother     Hepatitis Mother     Autoimmune disease Mother     Bleeding Disorder Mother     Hyperlipidemia Father     Hypertension Father     Diabetes Father     Stroke Father     Cancer Maternal Grandmother         Stomach    Malig Hyperthermia Neg Hx        Home Medications:  Prior to Admission medications    Medication Sig Start Date End Date Taking? Authorizing Provider   cetirizine (zyrTEC) 10 MG tablet Take 1 tablet by mouth Daily. 10/6/23   Yousuf Gomez MD   Golytely 236 g solution  2/1/24   Provider, MD Greta   ibuprofen (ADVIL,MOTRIN) 800 MG tablet Take 1 tablet by mouth Every 8 (Eight) Hours As Needed for Mild Pain. 10/27/23   Jv Norton MD   levonorgestrel-ethinyl estradiol (AVIANE,ALESSE,LESSINA) 0.1-20 MG-MCG per tablet Take 1 tablet by mouth Daily for 28 days. 5/18/24 6/15/24  Ange Smiht APRN   metoprolol succinate XL (TOPROL-XL) 25 MG 24 hr tablet TAKE 1 TABLET DAILY 10/25/23   Rony Spann MD   nicotine (Nicoderm CQ) 14 MG/24HR patch Place 1 patch on the skin as directed by provider Daily. 2/14/24   Misa Chavez APRN   nicotine (Nicoderm CQ) 21 MG/24HR patch Place 1 patch on the skin as directed by provider Daily. 1/17/24   Kathy  ISAAC Horvath   nicotine (Nicoderm CQ) 7 MG/24HR patch Place 1 patch on the skin as directed by provider Daily. 2/28/24   Misa Chavez APRN   Norditropin FlexPro 5 MG/1.5ML solution pen-injector Inject 0.25 mg under the skin into the appropriate area as directed. REPORTS THAT TAKES NORMALLY MONDAY THROUGH FRIDAY BUT HER LAST DOSE WAS OVER 2 WEEKS STOPPED ON OWN DIDN'T KNOW IF WOULD INTERFERE WITH HER SURGERY 11/15/21   Provider, MD Greta   topiramate (TOPAMAX) 100 MG tablet Take 1 tablet by mouth 2 (Two) Times a Day.    Greta Jackson MD   vitamin D (ERGOCALCIFEROL) 1.25 MG (53845 UT) capsule capsule Take 1 capsule by mouth 1 (One) Time Per Week. 1/26/24   Misa Chavez APRN   Levonorgestrel (Mirena, 52 MG,) 20 MCG/DAY intrauterine device IUD 1 each by Intrauterine route. Indications: Birth Control Treatment  5/18/24  ProviderGreta MD        Social History:   Social History     Tobacco Use    Smoking status: Every Day     Current packs/day: 0.50     Average packs/day: 0.5 packs/day for 6.4 years (3.2 ttl pk-yrs)     Types: Cigarettes     Start date: 2018     Passive exposure: Current    Smokeless tobacco: Never    Tobacco comments:     Has smoked this 8-21-23   Vaping Use    Vaping status: Never Used   Substance Use Topics    Alcohol use: Never    Drug use: Never         Review of Systems:  Review of Systems   Constitutional:  Negative for chills and fever.   HENT:  Negative for congestion, ear pain and sore throat.    Eyes:  Negative for pain.   Respiratory:  Negative for cough, chest tightness and shortness of breath.    Cardiovascular:  Negative for chest pain.   Gastrointestinal:  Negative for abdominal pain, diarrhea, nausea and vomiting.   Genitourinary:  Positive for menstrual problem and vaginal bleeding. Negative for dysuria, flank pain, frequency, hematuria, pelvic pain, urgency, vaginal discharge and vaginal pain.   Musculoskeletal:  Negative for back pain, joint  "swelling, neck pain and neck stiffness.   Skin:  Negative for pallor.   Neurological:  Negative for seizures and headaches.   All other systems reviewed and are negative.       Physical Exam:  /69   Pulse 61   Temp 98.7 °F (37.1 °C)   Resp 18   Ht 157.5 cm (62\")   Wt 68.5 kg (151 lb 0.2 oz)   SpO2 97%   BMI 27.62 kg/m²     Physical Exam  Vitals and nursing note reviewed. Exam conducted with a chaperone present (ALEXI PETERS).   Constitutional:       General: She is not in acute distress.     Appearance: Normal appearance. She is not ill-appearing or toxic-appearing.   HENT:      Head: Normocephalic and atraumatic.      Mouth/Throat:      Mouth: Mucous membranes are moist.   Eyes:      General: No scleral icterus.     Conjunctiva/sclera: Conjunctivae normal.      Pupils: Pupils are equal, round, and reactive to light.   Cardiovascular:      Rate and Rhythm: Normal rate and regular rhythm.      Pulses: Normal pulses.   Pulmonary:      Effort: Pulmonary effort is normal. No respiratory distress.   Abdominal:      General: Abdomen is flat.      Palpations: Abdomen is soft.      Tenderness: There is no abdominal tenderness. There is no guarding or rebound.   Genitourinary:     General: Normal vulva.      Exam position: Lithotomy position.      Vagina: Bleeding present.      Cervix: Normal. No discharge or lesion.   Musculoskeletal:         General: Normal range of motion.      Cervical back: Normal range of motion.   Skin:     General: Skin is warm and dry.      Capillary Refill: Capillary refill takes less than 2 seconds.      Findings: No rash.   Neurological:      General: No focal deficit present.      Mental Status: She is alert and oriented to person, place, and time. Mental status is at baseline.   Psychiatric:         Mood and Affect: Mood normal.         Behavior: Behavior normal.                Procedures:  Procedures      Medical Decision Making:      Comorbidities that affect care:    Low back " pain, palpitations, left shoulder pain, pituitary adenoma, scleroderma, anxiety, rotator cuff tear left, migraines, polymenorrhea    External Notes reviewed:    None      The following orders were placed and all results were independently analyzed by me:  Orders Placed This Encounter   Procedures    [place] Draw    Hemoglobin & Hematocrit, Blood    hCG, Quantitative, Pregnancy    Hemoglobin & Hematocrit, Blood    Green Top (Gel)    Lavender Top    Gold Top - SST    Light Blue Top       Medications Given in the Emergency Department:  Medications - No data to display     ED Course:         Labs:    Lab Results (last 24 hours)       Procedure Component Value Units Date/Time    Hemoglobin & Hematocrit, Blood [[id number]]  (Normal) Collected: 05/18/24 0251    Specimen: Blood from Arm, Left Updated: 05/18/24 0258     Hemoglobin 12.5 g/dL      Hematocrit 37.5 %     hCG, Quantitative, Pregnancy [[id number]] Collected: 05/18/24 0251    Specimen: Blood from Arm, Left Updated: 05/18/24 0314     HCG Quantitative <0.50 mIU/mL     Narrative:      HCG Ranges by Gestational Age    Females - non-pregnant premenopausal   </= 1mIU/mL HCG  Females - postmenopausal               </= 7mIU/mL HCG    3 Weeks       5.4   -      72 mIU/mL  4 Weeks      10.2   -     708 mIU/mL  5 Weeks       217   -   8,245 mIU/mL  6 Weeks       152   -  32,177 mIU/mL  7 Weeks     4,059   - 153,767 mIU/mL  8 Weeks    31,366   - 149,094 mIU/mL  9 Weeks    59,109   - 135,901 mIU/mL  10 Weeks   44,186   - 170,409 mIU/mL  12 Weeks   27,107   - 201,615 mIU/mL  14 Weeks   24,302   -  93,646 mIU/mL  15 Weeks   12,540   -  69,747 mIU/mL  16 Weeks    8,904   -  55,332 mIU/mL  17 Weeks    8,240   -  51,793 mIU/mL  18 Weeks    9,649   -  55,271 mIU/mL      Hemoglobin & Hematocrit, Blood [[id number]]  (Normal) Collected: 05/18/24 0446    Specimen: Blood Updated: 05/18/24 0455     Hemoglobin 12.4 g/dL      Hematocrit 37.5 %              Imaging:    No Radiology Exams  Resulted Within Past 24 Hours      Differential Diagnosis and Discussion:    Vaginal Bleeding: Differential diagnosis includes but is not limited to foreign body, tumor, vaginitis, dysfunctional uterine bleeding, endocrine abnormalities, coagulation disorder, systemic illness, polyps, complications of pregnancy (possible ectopic pregnancy).    All labs were reviewed and interpreted by me.    MDM  Number of Diagnoses or Management Options  Abnormal uterine bleeding (AUB): new and requires workup     Amount and/or Complexity of Data Reviewed  Clinical lab tests: reviewed  Decide to obtain previous medical records or to obtain history from someone other than the patient: yes    Risk of Complications, Morbidity, and/or Mortality  Presenting problems: low  Diagnostic procedures: low  Management options: low    Patient Progress  Patient progress: stable         Patient Care Considerations:    LABS: I considered ordering labs, however considering the patient stable condition I did not feel any further labs was necessary at this time.      Consultants/Shared Management Plan:    None    Social Determinants of Health:    Patient is independent, reliable, and has access to care.       Disposition and Care Coordination:    Discharged: The patient is suitable and stable for discharge with no need for consideration of admission.    I have explained the patient´s condition, diagnoses and treatment plan based on the information available to me at this time. I have answered questions and addressed any concerns. The patient has a good  understanding of the patient´s diagnosis, condition, and treatment plan as can be expected at this point. The vital signs have been stable. The patient´s condition is stable and appropriate for discharge from the emergency department.      The patient will pursue further outpatient evaluation with the primary care physician or other designated or consulting physician as outlined in the discharge  instructions. They are agreeable to this plan of care and follow-up instructions have been explained in detail. The patient has received these instructions in written format and has expressed an understanding of the discharge instructions. The patient is aware that any significant change in condition or worsening of symptoms should prompt an immediate return to this or the closest emergency department or call to 911.  I have explained discharge medications and the need for follow up with the patient/caretakers. This was also printed in the discharge instructions. Patient was discharged with the following medications and follow up:      Medication List        New Prescriptions      levonorgestrel-ethinyl estradiol 0.1-20 MG-MCG per tablet  Commonly known as: AVIANJOSETTE,IGNACIO,RUKHSANAINA  Take 1 tablet by mouth Daily for 28 days.               Where to Get Your Medications        These medications were sent to [facility]/pharmacy #[id number] - [place], [place] - [address] - [phone] [facility] [phone] FX  [address] [place] [address]      Hours: 24-hours Phone: [phone]   levonorgestrel-ethinyl estradiol 0.1-20 MG-MCG per tablet      [facility]    Call   MONDAY, FOR FOLLOW UP       Final diagnoses:   Abnormal uterine bleeding (AUB)        ED Disposition       ED Disposition   Discharge    Condition   Stable    Comment   --               This medical record created using voice recognition software.             Ange Smith APRN  05/18/24 0649

## 2024-06-05 ENCOUNTER — TRANSCRIBE ORDERS (OUTPATIENT)
Dept: ADMINISTRATIVE | Facility: HOSPITAL | Age: 44
End: 2024-06-05
Payer: OTHER GOVERNMENT

## 2024-06-05 DIAGNOSIS — N92.0 EXCESSIVE OR FREQUENT MENSTRUATION: ICD-10-CM

## 2024-06-05 DIAGNOSIS — N85.2 ENLARGED UTERUS: ICD-10-CM

## 2024-06-05 DIAGNOSIS — D25.9 UTERINE LEIOMYOMA, UNSPECIFIED LOCATION: Primary | ICD-10-CM

## 2024-06-19 ENCOUNTER — TELEPHONE (OUTPATIENT)
Dept: FAMILY MEDICINE CLINIC | Facility: CLINIC | Age: 44
End: 2024-06-19
Payer: OTHER GOVERNMENT

## 2024-06-19 DIAGNOSIS — E55.9 VITAMIN D DEFICIENCY: ICD-10-CM

## 2024-06-19 DIAGNOSIS — J34.89 LESION OF NOSE: Primary | ICD-10-CM

## 2024-06-19 RX ORDER — ERGOCALCIFEROL 1.25 MG/1
50000 CAPSULE ORAL WEEKLY
Qty: 13 CAPSULE | Refills: 3 | OUTPATIENT
Start: 2024-06-19

## 2024-06-19 NOTE — TELEPHONE ENCOUNTER
Pt requesting another referral to dermatology for a lesion on her nose. She was there and for the scalp and they recommended the nose to be addressed also.

## 2024-06-19 NOTE — TELEPHONE ENCOUNTER
Called pt and left vm to see if she requested vitamin d. Notes mention insurance did not cover and she may be taking an otc vitamin d

## 2024-07-12 DIAGNOSIS — M75.102 TEAR OF LEFT ROTATOR CUFF, UNSPECIFIED TEAR EXTENT, UNSPECIFIED WHETHER TRAUMATIC: ICD-10-CM

## 2024-07-12 RX ORDER — IBUPROFEN 800 MG/1
800 TABLET ORAL EVERY 8 HOURS PRN
Qty: 180 TABLET | Refills: 5 | Status: SHIPPED | OUTPATIENT
Start: 2024-07-12

## 2024-07-26 ENCOUNTER — OFFICE VISIT (OUTPATIENT)
Dept: FAMILY MEDICINE CLINIC | Facility: CLINIC | Age: 44
End: 2024-07-26
Payer: OTHER GOVERNMENT

## 2024-07-26 VITALS
TEMPERATURE: 98 F | WEIGHT: 150 LBS | DIASTOLIC BLOOD PRESSURE: 80 MMHG | BODY MASS INDEX: 27.44 KG/M2 | HEART RATE: 97 BPM | OXYGEN SATURATION: 98 % | SYSTOLIC BLOOD PRESSURE: 120 MMHG

## 2024-07-26 DIAGNOSIS — R68.84 JAW PAIN: ICD-10-CM

## 2024-07-26 DIAGNOSIS — H66.91 RIGHT OTITIS MEDIA, UNSPECIFIED OTITIS MEDIA TYPE: Primary | ICD-10-CM

## 2024-07-26 DIAGNOSIS — H66.90 CHRONIC OTITIS MEDIA, UNSPECIFIED OTITIS MEDIA TYPE: ICD-10-CM

## 2024-07-26 PROCEDURE — 99213 OFFICE O/P EST LOW 20 MIN: CPT | Performed by: FAMILY MEDICINE

## 2024-07-26 RX ORDER — CLINDAMYCIN HYDROCHLORIDE 300 MG/1
300 CAPSULE ORAL 4 TIMES DAILY
Qty: 28 CAPSULE | Refills: 0 | Status: SHIPPED | OUTPATIENT
Start: 2024-07-26 | End: 2024-08-02

## 2024-07-26 RX ORDER — PREDNISONE 20 MG/1
40 TABLET ORAL DAILY
Qty: 10 TABLET | Refills: 0 | Status: SHIPPED | OUTPATIENT
Start: 2024-07-26 | End: 2024-07-31

## 2024-07-26 RX ORDER — TRAMADOL HYDROCHLORIDE 50 MG/1
50 TABLET ORAL
COMMUNITY
Start: 2024-07-23

## 2024-07-26 RX ORDER — COLISTIN SULFATE, NEOMYCIN SULFATE, THONZONIUM BROMIDE AND HYDROCORTISONE ACETATE 3; 3.3; .5; 1 MG/ML; MG/ML; MG/ML; MG/ML
3 SUSPENSION AURICULAR (OTIC) 4 TIMES DAILY
Qty: 10 ML | Refills: 0 | Status: SHIPPED | OUTPATIENT
Start: 2024-07-26 | End: 2024-08-02

## 2024-07-26 RX ORDER — SACCHAROMYCES BOULARDII 250 MG
250 CAPSULE ORAL 2 TIMES DAILY
Qty: 20 CAPSULE | Refills: 0 | Status: SHIPPED | OUTPATIENT
Start: 2024-07-26 | End: 2024-08-05

## 2024-07-26 NOTE — PROGRESS NOTES
Chief Complaint  Earache (Right ear/) and Jaw Pain (Right side/)    Subjective          Laxmi Jc presents to [facility]  History of Present Illness  Pt has difficulty moving jaw on right side- woke up with  Earache   There is pain in the right ear. This is a new problem. The current episode started yesterday. The problem occurs constantly. The problem has been unchanged. There has been no fever. The pain is moderate. Pertinent negatives include no abdominal pain, coughing, diarrhea, drainage, headaches, hearing loss, neck pain, rash, rhinorrhea, sore throat or vomiting. She has tried nothing for the symptoms.                Objective   Allergies   Allergen Reactions    Floxin Otic [Ofloxacin] Other (See Comments)     Swelling of ear canal     Penicillins Rash and Other (See Comments)    Sulfa Antibiotics Rash and Other (See Comments)    Sulfamethoxazole-Trimethoprim Rash     Immunization History   Administered Date(s) Administered    COVID-19 (MODERNA) 1st,2nd,3rd Dose Monovalent 03/24/2021, 04/21/2021    Fluzone (or Fluarix & Flulaval for VFC) >6mos 11/05/2019, 01/17/2024    Hepatitis A 07/20/2018    PPD Test 08/28/2017    Pneumococcal Conjugate 20-Valent (PCV20) 01/17/2024    Tdap 07/20/2018     Past Medical History:   Diagnosis Date    Adhesive capsulitis of left shoulder     Anxiety     HX OF NO CURRENT ISSUES AND NOT ON ANY MEDICATIONS    Low back pain     HAS HAD 1 EPISODE OF LOWER BACK PAIN HAD SPRAINED IT. NO CURRENT ISSUES    Migraines     Palpitations     DENIES CP/SOA. REPORTS STARTED POST COVID.  FOLLOWED BY DR GUADARRAMA YEARLY. ACTIVE    Pituitary adenoma     FOLLOWED BY DR [facility]AN [facility]    Polymenorrhea     PT REPORTS HAS PERIODS ABOUT EVERY 2 WEEKS    Scleroderma     nonreactive    Tear of left rotator cuff       Past Surgical History:   Procedure Laterality Date    BREAST BIOPSY Left     FAT GRAFTING Left     REPAIR OF EXCISIONAL BX LEFT    HYSTERECTOMY   07/23/2024    KIDNEY STONE SURGERY      OVARIAN CYST REMOVAL      SHOULDER ARTHROSCOPY W/ ROTATOR CUFF REPAIR Left 08/21/2023    Procedure: LEFT SHOULDER MANIPULATION,  ARTHROSCOPY ROTATOR CUFF DEBRIDEMENT AND SUBACROMIAL DECOMPRESSION , labral debridement, subpectoral biceps tenodesis;  Surgeon: Jv Norton MD;  Location: [facility] [place];  Service: Orthopedics;  Laterality: Left;      Social History     Socioeconomic History    Marital status:    Tobacco Use    Smoking status: Former     Current packs/day: 0.50     Average packs/day: 0.5 packs/day for 6.6 years (3.3 ttl pk-yrs)     Types: Cigarettes     Start date: 2018     Passive exposure: Current    Smokeless tobacco: Never    Tobacco comments:     Quit 4-2024   Vaping Use    Vaping status: Never Used   Substance and Sexual Activity    Alcohol use: Never    Drug use: Never    Sexual activity: Defer        Current Outpatient Medications:     cetirizine (zyrTEC) 10 MG tablet, Take 1 tablet by mouth Daily., Disp: 90 tablet, Rfl: 3    ibuprofen (ADVIL,MOTRIN) 800 MG tablet, TAKE 1 TABLET EVERY 8 HOURS AS NEEDED FOR MILD PAIN, Disp: 180 tablet, Rfl: 5    metoprolol succinate XL (TOPROL-XL) 25 MG 24 hr tablet, TAKE 1 TABLET DAILY, Disp: 90 tablet, Rfl: 3    traMADol (ULTRAM) 50 MG tablet, Take 1 tablet by mouth. As needed, Disp: , Rfl:     clindamycin (Cleocin) 300 MG capsule, Take 1 capsule by mouth 4 (Four) Times a Day for 7 days., Disp: 28 capsule, Rfl: 0    neomycin-colistin-hydrocortisone-thonzonium (Cortisporin-TC) 3.3-3-10-0.5 MG/ML otic suspension, Administer 3 drops to the right ear 4 (Four) Times a Day for 7 days., Disp: 10 mL, Rfl: 0    predniSONE (DELTASONE) 20 MG tablet, Take 2 tablets by mouth Daily for 5 days., Disp: 10 tablet, Rfl: 0    saccharomyces boulardii (Florastor) 250 MG capsule, Take 1 capsule by mouth 2 (Two) Times a Day for 10 days., Disp: 20 capsule, Rfl: 0   Family History   Problem Relation Age of Onset    Cancer Mother          Colon    Kidney disease Mother     Hepatitis Mother     Autoimmune disease Mother     Bleeding Disorder Mother     Hyperlipidemia Father     Hypertension Father     Diabetes Father     Stroke Father     Cancer Maternal Grandmother         Stomach    Malig Hyperthermia Neg Hx           Vital Signs:   Vitals:    07/26/24 1554   BP: 120/80   Pulse: 97   Temp: 98 °F (36.7 °C)   SpO2: 98%   Weight: 68 kg (150 lb)       Review of Systems   Constitutional:  Negative for fatigue and fever.   HENT:  Positive for ear pain. Negative for congestion, hearing loss, hoarse voice, rhinorrhea, sore throat and tinnitus.    Eyes:  Negative for visual disturbance.   Respiratory:  Negative for cough, chest tightness, shortness of breath and wheezing.    Cardiovascular:  Negative for chest pain, palpitations and leg swelling.   Gastrointestinal:  Negative for abdominal pain, diarrhea, nausea and vomiting.   Musculoskeletal:  Negative for neck pain.   Skin:  Negative for rash.   Neurological:  Negative for dizziness, light-headedness and headaches.   Hematological:  Negative for adenopathy.      Physical Exam  Vitals reviewed.   Constitutional:       Appearance: Normal appearance. She is well-developed.   HENT:      Head: Normocephalic and atraumatic.      Right Ear: External ear normal.      Left Ear: Tympanic membrane, ear canal and external ear normal.      Ears:      Comments: Right TM cloudy, red, canal swollen.  Mastoid area non-tender, no warmth, redness, or swelling.     Nose: Nose normal.      Mouth/Throat:      Mouth: Mucous membranes are moist.      Pharynx: Oropharynx is clear. No oropharyngeal exudate or posterior oropharyngeal erythema.      Comments: No swelling or redness or warmth in face or neck.  Eyes:      Conjunctiva/sclera: Conjunctivae normal.      Pupils: Pupils are equal, round, and reactive to light.   Cardiovascular:      Rate and Rhythm: Normal rate and regular rhythm.      Pulses: Normal pulses.      Heart  sounds: Normal heart sounds. No murmur heard.     No friction rub. No gallop.   Pulmonary:      Effort: Pulmonary effort is normal.      Breath sounds: Normal breath sounds. No wheezing or rhonchi.   Abdominal:      General: Abdomen is flat. Bowel sounds are normal. There is no distension.      Palpations: Abdomen is soft. There is no mass.      Tenderness: There is no abdominal tenderness. There is no guarding or rebound.      Hernia: No hernia is present.   Musculoskeletal:         General: Normal range of motion.   Skin:     General: Skin is warm and dry.      Capillary Refill: Capillary refill takes less than 2 seconds.   Neurological:      General: No focal deficit present.      Mental Status: She is alert and oriented to person, place, and time.      Cranial Nerves: No cranial nerve deficit.   Psychiatric:         Mood and Affect: Mood and affect normal.         Behavior: Behavior normal.         Thought Content: Thought content normal.         Judgment: Judgment normal.        Result Review :                 Assessment and Plan    Diagnoses and all orders for this visit:    1. Right otitis media, unspecified otitis media type (Primary)  -     Ambulatory Referral to ENT (Otolaryngology)  -     neomycin-colistin-hydrocortisone-thonzonium (Cortisporin-TC) 3.3-3-10-0.5 MG/ML otic suspension; Administer 3 drops to the right ear 4 (Four) Times a Day for 7 days.  Dispense: 10 mL; Refill: 0  -     clindamycin (Cleocin) 300 MG capsule; Take 1 capsule by mouth 4 (Four) Times a Day for 7 days.  Dispense: 28 capsule; Refill: 0  -     saccharomyces boulardii (Florastor) 250 MG capsule; Take 1 capsule by mouth 2 (Two) Times a Day for 10 days.  Dispense: 20 capsule; Refill: 0  -     CT Soft Tissue Neck With Contrast; Future    2. Chronic otitis media, unspecified otitis media type  -     Ambulatory Referral to ENT (Otolaryngology)  -     CT Soft Tissue Neck With Contrast; Future    3. Jaw pain  -     Ambulatory Referral to  ENT (Otolaryngology)  -     predniSONE (DELTASONE) 20 MG tablet; Take 2 tablets by mouth Daily for 5 days.  Dispense: 10 tablet; Refill: 0  -     CT Soft Tissue Neck With Contrast; Future            Follow Up   Return if symptoms worsen or fail to improve.  Patient was given instructions and counseling regarding her condition or for health maintenance advice. Please see specific information pulled into the AVS if appropriate.   Pt told to go straight to ER if she develops any fever or has worsening symptoms or has any neck pain, swelling, redness, or warmth.

## 2024-08-03 ENCOUNTER — APPOINTMENT (OUTPATIENT)
Dept: CT IMAGING | Facility: HOSPITAL | Age: 44
End: 2024-08-03
Payer: OTHER GOVERNMENT

## 2024-08-03 ENCOUNTER — APPOINTMENT (OUTPATIENT)
Dept: GENERAL RADIOLOGY | Facility: HOSPITAL | Age: 44
End: 2024-08-03
Payer: OTHER GOVERNMENT

## 2024-08-03 ENCOUNTER — HOSPITAL ENCOUNTER (EMERGENCY)
Facility: HOSPITAL | Age: 44
Discharge: HOME | End: 2024-08-03
Attending: EMERGENCY MEDICINE
Payer: OTHER GOVERNMENT

## 2024-08-03 VITALS
BODY MASS INDEX: 27.99 KG/M2 | OXYGEN SATURATION: 100 % | TEMPERATURE: 98.8 F | DIASTOLIC BLOOD PRESSURE: 81 MMHG | WEIGHT: 152.12 LBS | SYSTOLIC BLOOD PRESSURE: 106 MMHG | HEIGHT: 62 IN | RESPIRATION RATE: 18 BRPM | HEART RATE: 76 BPM

## 2024-08-03 DIAGNOSIS — K29.70 GASTRITIS WITHOUT BLEEDING, UNSPECIFIED CHRONICITY, UNSPECIFIED GASTRITIS TYPE: ICD-10-CM

## 2024-08-03 DIAGNOSIS — R07.9 CHEST PAIN, UNSPECIFIED TYPE: Primary | ICD-10-CM

## 2024-08-03 LAB
ALBUMIN SERPL-MCNC: 4.1 G/DL (ref 3.5–5.2)
ALBUMIN/GLOB SERPL: 1.2 G/DL
ALP SERPL-CCNC: 73 U/L (ref 39–117)
ALT SERPL W P-5'-P-CCNC: 11 U/L (ref 1–33)
ANION GAP SERPL CALCULATED.3IONS-SCNC: 14.2 MMOL/L (ref 5–15)
AST SERPL-CCNC: 11 U/L (ref 1–32)
BASOPHILS # BLD AUTO: 0.09 10*3/MM3 (ref 0–0.2)
BASOPHILS NFR BLD AUTO: 0.7 % (ref 0–1.5)
BILIRUB SERPL-MCNC: 0.2 MG/DL (ref 0–1.2)
BUN SERPL-MCNC: 12 MG/DL (ref 6–20)
BUN/CREAT SERPL: 14.5 (ref 7–25)
CALCIUM SPEC-SCNC: 9.4 MG/DL (ref 8.6–10.5)
CHLORIDE SERPL-SCNC: 96 MMOL/L (ref 98–107)
CO2 SERPL-SCNC: 23.8 MMOL/L (ref 22–29)
CREAT SERPL-MCNC: 0.83 MG/DL (ref 0.57–1)
D DIMER PPP FEU-MCNC: 1.4 MCGFEU/ML (ref 0–0.5)
DEPRECATED RDW RBC AUTO: 43.6 FL (ref 37–54)
EGFRCR SERPLBLD CKD-EPI 2021: 89.8 ML/MIN/1.73
EOSINOPHIL # BLD AUTO: 0.68 10*3/MM3 (ref 0–0.4)
EOSINOPHIL NFR BLD AUTO: 4.9 % (ref 0.3–6.2)
ERYTHROCYTE [DISTWIDTH] IN BLOOD BY AUTOMATED COUNT: 13.2 % (ref 12.3–15.4)
GEN 5 2HR TROPONIN T REFLEX: <6 NG/L
GLOBULIN UR ELPH-MCNC: 3.5 GM/DL
GLUCOSE SERPL-MCNC: 118 MG/DL (ref 65–99)
HCT VFR BLD AUTO: 34.8 % (ref 34–46.6)
HGB BLD-MCNC: 11.2 G/DL (ref 12–15.9)
HOLD SPECIMEN: NORMAL
HOLD SPECIMEN: NORMAL
IMM GRANULOCYTES # BLD AUTO: 0.09 10*3/MM3 (ref 0–0.05)
IMM GRANULOCYTES NFR BLD AUTO: 0.7 % (ref 0–0.5)
LIPASE SERPL-CCNC: 41 U/L (ref 13–60)
LYMPHOCYTES # BLD AUTO: 2.58 10*3/MM3 (ref 0.7–3.1)
LYMPHOCYTES NFR BLD AUTO: 18.6 % (ref 19.6–45.3)
MAGNESIUM SERPL-MCNC: 2 MG/DL (ref 1.6–2.6)
MCH RBC QN AUTO: 28.9 PG (ref 26.6–33)
MCHC RBC AUTO-ENTMCNC: 32.2 G/DL (ref 31.5–35.7)
MCV RBC AUTO: 89.9 FL (ref 79–97)
MONOCYTES # BLD AUTO: 1.06 10*3/MM3 (ref 0.1–0.9)
MONOCYTES NFR BLD AUTO: 7.7 % (ref 5–12)
NEUTROPHILS NFR BLD AUTO: 67.4 % (ref 42.7–76)
NEUTROPHILS NFR BLD AUTO: 9.34 10*3/MM3 (ref 1.7–7)
NRBC BLD AUTO-RTO: 0 /100 WBC (ref 0–0.2)
NT-PROBNP SERPL-MCNC: <36 PG/ML (ref 0–450)
PLATELET # BLD AUTO: 540 10*3/MM3 (ref 140–450)
PMV BLD AUTO: 9.1 FL (ref 6–12)
POTASSIUM SERPL-SCNC: 4 MMOL/L (ref 3.5–5.2)
PROT SERPL-MCNC: 7.6 G/DL (ref 6–8.5)
QT INTERVAL: 336 MS
QTC INTERVAL: 399 MS
RBC # BLD AUTO: 3.87 10*6/MM3 (ref 3.77–5.28)
SODIUM SERPL-SCNC: 134 MMOL/L (ref 136–145)
TROPONIN T DELTA: NORMAL
TROPONIN T SERPL HS-MCNC: <6 NG/L
WBC NRBC COR # BLD AUTO: 13.84 10*3/MM3 (ref 3.4–10.8)
WHOLE BLOOD HOLD COAG: NORMAL
WHOLE BLOOD HOLD SPECIMEN: NORMAL

## 2024-08-03 PROCEDURE — 80053 COMPREHEN METABOLIC PANEL: CPT | Performed by: EMERGENCY MEDICINE

## 2024-08-03 PROCEDURE — 25510000001 IOPAMIDOL PER 1 ML: Performed by: EMERGENCY MEDICINE

## 2024-08-03 PROCEDURE — 25810000003 SODIUM CHLORIDE 0.9 % SOLUTION: Performed by: EMERGENCY MEDICINE

## 2024-08-03 PROCEDURE — 36415 COLL VENOUS BLD VENIPUNCTURE: CPT

## 2024-08-03 PROCEDURE — 85025 COMPLETE CBC W/AUTO DIFF WBC: CPT | Performed by: EMERGENCY MEDICINE

## 2024-08-03 PROCEDURE — 96375 TX/PRO/DX INJ NEW DRUG ADDON: CPT

## 2024-08-03 PROCEDURE — 71260 CT THORAX DX C+: CPT

## 2024-08-03 PROCEDURE — 83690 ASSAY OF LIPASE: CPT | Performed by: EMERGENCY MEDICINE

## 2024-08-03 PROCEDURE — 85379 FIBRIN DEGRADATION QUANT: CPT | Performed by: EMERGENCY MEDICINE

## 2024-08-03 PROCEDURE — 84484 ASSAY OF TROPONIN QUANT: CPT | Performed by: EMERGENCY MEDICINE

## 2024-08-03 PROCEDURE — 25010000002 ONDANSETRON PER 1 MG: Performed by: EMERGENCY MEDICINE

## 2024-08-03 PROCEDURE — 96374 THER/PROPH/DIAG INJ IV PUSH: CPT

## 2024-08-03 PROCEDURE — 99285 EMERGENCY DEPT VISIT HI MDM: CPT

## 2024-08-03 PROCEDURE — 83735 ASSAY OF MAGNESIUM: CPT | Performed by: EMERGENCY MEDICINE

## 2024-08-03 PROCEDURE — 93005 ELECTROCARDIOGRAM TRACING: CPT | Performed by: EMERGENCY MEDICINE

## 2024-08-03 PROCEDURE — 83880 ASSAY OF NATRIURETIC PEPTIDE: CPT | Performed by: EMERGENCY MEDICINE

## 2024-08-03 PROCEDURE — 71045 X-RAY EXAM CHEST 1 VIEW: CPT

## 2024-08-03 RX ORDER — PANTOPRAZOLE SODIUM 40 MG/10ML
40 INJECTION, POWDER, LYOPHILIZED, FOR SOLUTION INTRAVENOUS ONCE
Status: COMPLETED | OUTPATIENT
Start: 2024-08-03 | End: 2024-08-03

## 2024-08-03 RX ORDER — ESOMEPRAZOLE MAGNESIUM 40 MG/1
40 CAPSULE, DELAYED RELEASE ORAL
Qty: 20 CAPSULE | Refills: 0 | Status: SHIPPED | OUTPATIENT
Start: 2024-08-03

## 2024-08-03 RX ORDER — ESOMEPRAZOLE MAGNESIUM 40 MG/1
40 CAPSULE, DELAYED RELEASE ORAL
Qty: 20 CAPSULE | Refills: 0 | Status: SHIPPED | OUTPATIENT
Start: 2024-08-03 | End: 2024-08-03

## 2024-08-03 RX ORDER — ASPIRIN 81 MG/1
324 TABLET, CHEWABLE ORAL ONCE
Status: COMPLETED | OUTPATIENT
Start: 2024-08-03 | End: 2024-08-03

## 2024-08-03 RX ORDER — ONDANSETRON 2 MG/ML
4 INJECTION INTRAMUSCULAR; INTRAVENOUS ONCE
Status: COMPLETED | OUTPATIENT
Start: 2024-08-03 | End: 2024-08-03

## 2024-08-03 RX ORDER — SODIUM CHLORIDE 0.9 % (FLUSH) 0.9 %
10 SYRINGE (ML) INJECTION AS NEEDED
Status: DISCONTINUED | OUTPATIENT
Start: 2024-08-03 | End: 2024-08-03 | Stop reason: HOSPADM

## 2024-08-03 RX ADMIN — SODIUM CHLORIDE 1000 ML: 9 INJECTION, SOLUTION INTRAVENOUS at 15:07

## 2024-08-03 RX ADMIN — IOPAMIDOL 100 ML: 755 INJECTION, SOLUTION INTRAVENOUS at 17:08

## 2024-08-03 RX ADMIN — ASPIRIN 324 MG: 81 TABLET, CHEWABLE ORAL at 14:28

## 2024-08-03 RX ADMIN — PANTOPRAZOLE SODIUM 40 MG: 40 INJECTION, POWDER, FOR SOLUTION INTRAVENOUS at 15:07

## 2024-08-03 RX ADMIN — ONDANSETRON 4 MG: 2 INJECTION INTRAMUSCULAR; INTRAVENOUS at 15:07

## 2024-08-03 NOTE — ED PROVIDER NOTES
Time: 2:25 PM EDT  Date of encounter:  8/3/2024  Independent Historian/Clinical History and Information was obtained by:   Patient    History is limited by: N/A    Chief Complaint: Chest discomfort.      History of Present Illness:  Patient is a 43 y.o. year old female who presents to the emergency department for evaluation of chest discomfort.  This patient presents to the emergency department complaining of right-sided chest discomfort that started earlier in the day.  She states it starts in the right lateral inferior chest and then radiates to the epigastrium where there is a hot feeling in the epigastric area.  It then goes to the left side of her chest.  She states that some of the symptoms are worse with movement she has had nausea but no vomiting.  The patient denies any definite fever chills cough shortness of breath or other new complaints.  She has had no diarrhea or bloody or black bowel movements.  The patient did have a hysterectomy performed last week at the [facility].    HPI    Patient Care Team  Primary Care Provider: Misa Chavez APRN    Past Medical History:     Allergies   Allergen Reactions    Floxin Otic [Ofloxacin] Other (See Comments)     Swelling of ear canal     Penicillins Rash and Other (See Comments)    Sulfa Antibiotics Rash and Other (See Comments)    Sulfamethoxazole-Trimethoprim Rash     Past Medical History:   Diagnosis Date    Adhesive capsulitis of left shoulder     Anxiety     HX OF NO CURRENT ISSUES AND NOT ON ANY MEDICATIONS    Low back pain     HAS HAD 1 EPISODE OF LOWER BACK PAIN HAD SPRAINED IT. NO CURRENT ISSUES    Migraines     Palpitations     DENIES CP/SOA. REPORTS STARTED POST COVID.  FOLLOWED BY DR GUADARRAMA YEARLY. ACTIVE    Pituitary adenoma     FOLLOWED BY DR [facility]EBAN [facility]    Polymenorrhea     PT REPORTS HAS PERIODS ABOUT EVERY 2 WEEKS    Scleroderma     nonreactive    Tear of left rotator cuff      Past Surgical History:   Procedure  Laterality Date    BREAST BIOPSY Left     FAT GRAFTING Left     REPAIR OF EXCISIONAL BX LEFT    HYSTERECTOMY  07/23/2024    KIDNEY STONE SURGERY      OVARIAN CYST REMOVAL      SHOULDER ARTHROSCOPY W/ ROTATOR CUFF REPAIR Left 08/21/2023    Procedure: LEFT SHOULDER MANIPULATION,  ARTHROSCOPY ROTATOR CUFF DEBRIDEMENT AND SUBACROMIAL DECOMPRESSION , labral debridement, subpectoral biceps tenodesis;  Surgeon: Jv Norton MD;  Location: [facility] OR [facility];  Service: Orthopedics;  Laterality: Left;     Family History   Problem Relation Age of Onset    Cancer Mother         Colon    Kidney disease Mother     Hepatitis Mother     Autoimmune disease Mother     Bleeding Disorder Mother     Hyperlipidemia Father     Hypertension Father     Diabetes Father     Stroke Father     Cancer Maternal Grandmother         Stomach    Malig Hyperthermia Neg Hx        Home Medications:  Prior to Admission medications    Medication Sig Start Date End Date Taking? Authorizing Provider   cetirizine (zyrTEC) 10 MG tablet Take 1 tablet by mouth Daily. 10/6/23   Yousuf Gomez MD   clindamycin (Cleocin) 300 MG capsule Take 1 capsule by mouth 4 (Four) Times a Day for 7 days. 7/26/24 8/2/24  Michael Sow MD   ibuprofen (ADVIL,MOTRIN) 800 MG tablet TAKE 1 TABLET EVERY 8 HOURS AS NEEDED FOR MILD PAIN 7/12/24   Jv Norton MD   metoprolol succinate XL (TOPROL-XL) 25 MG 24 hr tablet TAKE 1 TABLET DAILY 10/25/23   Rony Spann MD   neomycin-colistin-hydrocortisone-thonzonium (Cortisporin-TC) 3.3-3-10-0.5 MG/ML otic suspension Administer 3 drops to the right ear 4 (Four) Times a Day for 7 days. 7/26/24 8/2/24  Michael Sow MD   saccharomyces boulardii (Florastor) 250 MG capsule Take 1 capsule by mouth 2 (Two) Times a Day for 10 days. 7/26/24 8/5/24  Michael Sow MD   traMADol (ULTRAM) 50 MG tablet Take 1 tablet by mouth. As needed 7/23/24   ProviderGreta MD        Social History:   Social  "History     Tobacco Use    Smoking status: Former     Current packs/day: 0.50     Average packs/day: 0.5 packs/day for 6.6 years (3.3 ttl pk-yrs)     Types: Cigarettes     Start date: 2018     Passive exposure: Current    Smokeless tobacco: Never    Tobacco comments:     Quit 4-2024   Vaping Use    Vaping status: Never Used   Substance Use Topics    Alcohol use: Never    Drug use: Never         Review of Systems:  Review of Systems   Constitutional:  Negative for chills and fever.   HENT:  Negative for congestion, ear pain and sore throat.    Eyes:  Negative for pain.   Respiratory:  Negative for cough, chest tightness and shortness of breath.    Cardiovascular:  Positive for chest pain.   Gastrointestinal:  Positive for nausea. Negative for abdominal pain, diarrhea and vomiting.   Genitourinary:  Negative for flank pain and hematuria.   Musculoskeletal:  Negative for joint swelling.   Skin:  Negative for pallor.   Neurological:  Negative for seizures and headaches.   All other systems reviewed and are negative.       Physical Exam:  /81 (Patient Position: Sitting)   Pulse 76   Temp 98.8 °F (37.1 °C) (Oral)   Resp 18   Ht 157.5 cm (62\")   Wt 69 kg (152 lb 1.9 oz)   LMP 07/05/2024 (Approximate)   SpO2 100%   BMI 27.82 kg/m²     Physical Exam  Vitals and nursing note reviewed.   Constitutional:       General: She is not in acute distress.     Appearance: Normal appearance. She is not toxic-appearing.   HENT:      Head: Normocephalic and atraumatic.      Mouth/Throat:      Mouth: Mucous membranes are moist.   Eyes:      General: No scleral icterus.  Cardiovascular:      Rate and Rhythm: Normal rate and regular rhythm.      Pulses: Normal pulses.      Heart sounds: Normal heart sounds.   Pulmonary:      Effort: Pulmonary effort is normal. No respiratory distress.      Breath sounds: Normal breath sounds.   Chest:      Chest wall: No mass.      Comments: Patient has no chest wall tenderness  Abdominal:      " General: Abdomen is flat.      Palpations: Abdomen is soft.      Tenderness: There is no abdominal tenderness.      Comments: The patient has no abdominal tenderness, guarding or rebound.   Musculoskeletal:         General: Normal range of motion.      Cervical back: Normal range of motion and neck supple.   Skin:     General: Skin is warm and dry.   Neurological:      Mental Status: She is alert and oriented to person, place, and time. Mental status is at baseline.                  Procedures:  Procedures      Medical Decision Making:      Comorbidities that affect care:    Recent surgery.    External Notes reviewed:    Previous Clinic Note: Recent clinic visit for otitis externa.      The following orders were placed and all results were independently analyzed by me:  Orders Placed This Encounter   Procedures    XR Chest 1 View    CT Chest With Contrast Diagnostic    Gilchrist Draw    High Sensitivity Troponin T    Comprehensive Metabolic Panel    Lipase    BNP    Magnesium    CBC Auto Differential    High Sensitivity Troponin T 2Hr    D-dimer, Quantitative    Undress & Gown    Continuous Pulse Oximetry    ECG 12 Lead ED Triage Standing Order; Chest Pain    CBC & Differential    Green Top (Gel)    Lavender Top    Gold Top - SST    Light Blue Top       Medications Given in the Emergency Department:  Medications   aspirin chewable tablet 324 mg (324 mg Oral Given 8/3/24 1428)   sodium chloride 0.9 % bolus 1,000 mL (0 mL Intravenous Stopped 8/3/24 1637)   pantoprazole (PROTONIX) injection 40 mg (40 mg Intravenous Given 8/3/24 1507)   ondansetron (ZOFRAN) injection 4 mg (4 mg Intravenous Given 8/3/24 1507)   iopamidol (ISOVUE-370) 76 % injection 100 mL (100 mL Intravenous Given 8/3/24 1708)        ED Course:       EKG: Sinus rhythm rate 85 bpm normal P wave and TX interval  Normal QRS and normal axis  Normal ST segment  Normal QT QTc interval.      Labs:    Lab Results (last 24 hours)       Procedure Component Value  Units Date/Time    High Sensitivity Troponin T [[id number]]  (Normal) Collected: 08/03/24 1417    Specimen: Blood from Arm, Left Updated: 08/03/24 1453     HS Troponin T <6 ng/L     Narrative:      High Sensitive Troponin T Reference Range:  <14.0 ng/L- Negative Female for AMI  <22.0 ng/L- Negative Male for AMI  >=14 - Abnormal Female indicating possible myocardial injury.  >=22 - Abnormal Male indicating possible myocardial injury.   Clinicians would have to utilize clinical acumen, EKG, Troponin, and serial changes to determine if it is an Acute Myocardial Infarction or myocardial injury due to an underlying chronic condition.         CBC & Differential [[id number]]  (Abnormal) Collected: 08/03/24 1417    Specimen: Blood from Arm, Left Updated: 08/03/24 1428    Narrative:      The following orders were created for panel order CBC & Differential.  Procedure                               Abnormality         Status                     ---------                               -----------         ------                     CBC Auto Differential[[id number]]        Abnormal            Final result                 Please view results for these tests on the individual orders.    Comprehensive Metabolic Panel [[id number]]  (Abnormal) Collected: 08/03/24 1417    Specimen: Blood from Arm, Left Updated: 08/03/24 1453     Glucose 118 mg/dL      BUN 12 mg/dL      Creatinine 0.83 mg/dL      Sodium 134 mmol/L      Potassium 4.0 mmol/L      Chloride 96 mmol/L      CO2 23.8 mmol/L      Calcium 9.4 mg/dL      Total Protein 7.6 g/dL      Albumin 4.1 g/dL      ALT (SGPT) 11 U/L      AST (SGOT) 11 U/L      Alkaline Phosphatase 73 U/L      Total Bilirubin 0.2 mg/dL      Globulin 3.5 gm/dL      A/G Ratio 1.2 g/dL      BUN/Creatinine Ratio 14.5     Anion Gap 14.2 mmol/L      eGFR 89.8 mL/min/1.73     Narrative:      GFR Normal >60  Chronic Kidney Disease <60  Kidney Failure <15      Lipase [[id number]]  (Normal) Collected: 08/03/24 1417     Specimen: Blood from Arm, Left Updated: 08/03/24 1453     Lipase 41 U/L     BNP [[id number]]  (Normal) Collected: 08/03/24 1417    Specimen: Blood from Arm, Left Updated: 08/03/24 1449     proBNP <36.0 pg/mL     Narrative:      This assay is used as an aid in the diagnosis of individuals suspected of having heart failure. It can be used as an aid in the diagnosis of acute decompensated heart failure (ADHF) in patients presenting with signs and symptoms of ADHF to the emergency department (ED). In addition, NT-proBNP of <300 pg/mL indicates ADHF is not likely.    Age Range Result Interpretation  NT-proBNP Concentration (pg/mL:      <50             Positive            >450                   Gray                 300-450                    Negative             <300    50-75           Positive            >900                  Gray                300-900                  Negative            <300      >75             Positive            >1800                  Gray                300-1800                  Negative            <300    Magnesium [[id number]]  (Normal) Collected: 08/03/24 1417    Specimen: Blood from Arm, Left Updated: 08/03/24 1453     Magnesium 2.0 mg/dL     CBC Auto Differential [[id number]]  (Abnormal) Collected: 08/03/24 1417    Specimen: Blood from Arm, Left Updated: 08/03/24 1428     WBC 13.84 10*3/mm3      RBC 3.87 10*6/mm3      Hemoglobin 11.2 g/dL      Hematocrit 34.8 %      MCV 89.9 fL      MCH 28.9 pg      MCHC 32.2 g/dL      RDW 13.2 %      RDW-SD 43.6 fl      MPV 9.1 fL      Platelets 540 10*3/mm3      Neutrophil % 67.4 %      Lymphocyte % 18.6 %      Monocyte % 7.7 %      Eosinophil % 4.9 %      Basophil % 0.7 %      Immature Grans % 0.7 %      Neutrophils, Absolute 9.34 10*3/mm3      Lymphocytes, Absolute 2.58 10*3/mm3      Monocytes, Absolute 1.06 10*3/mm3      Eosinophils, Absolute 0.68 10*3/mm3      Basophils, Absolute 0.09 10*3/mm3      Immature Grans, Absolute 0.09 10*3/mm3      nRBC 0.0 /100  "WBC     D-dimer, Quantitative [[id number]]  (Abnormal) Collected: 08/03/24 1417    Specimen: Blood from Arm, Left Updated: 08/03/24 1523     D-Dimer, Quantitative 1.40 MCGFEU/mL     Narrative:      According to the assay 's published package insert, a normal (<0.50 MCGFEU/mL) D-dimer result in conjunction with a non-high clinical probability assessment, excludes deep vein thrombosis (DVT) and pulmonary embolism (PE) with high sensitivity.    D-dimer values increase with age and this can make VTE exclusion of an older population difficult. To address this, the American College [employer], based on best available evidence and recent guidelines, recommends that clinicians use age-adjusted D-dimer thresholds in patients greater than 50 years of age with: a) a low probability of PE who do not meet all Pulmonary Embolism Rule Out Criteria, or b) in those with intermediate probability of PE.   The formula for an age-adjusted D-dimer cut-off is \"age/100\".  For example, a 60 year old patient would have an age-adjusted cut-off of 0.60 MCGFEU/mL and an 80 year old 0.80 MCGFEU/mL.    High Sensitivity Troponin T 2Hr [[id number]] Collected: 08/03/24 1645    Specimen: Blood Updated: 08/03/24 1714     HS Troponin T <6 ng/L      Troponin T Delta --     Comment: Unable to calculate.       Narrative:      High Sensitive Troponin T Reference Range:  <14.0 ng/L- Negative Female for AMI  <22.0 ng/L- Negative Male for AMI  >=14 - Abnormal Female indicating possible myocardial injury.  >=22 - Abnormal Male indicating possible myocardial injury.   Clinicians would have to utilize clinical acumen, EKG, Troponin, and serial changes to determine if it is an Acute Myocardial Infarction or myocardial injury due to an underlying chronic condition.                  Imaging:    CT Chest With Contrast Diagnostic    Result Date: 8/3/2024  CT CHEST W CONTRAST DIAGNOSTIC Date of Exam: 8/3/2024 4:55 PM EDT Indication: Chest pain. " Comparison: Chest radiograph from earlier today Technique: Axial CT images were obtained of the chest after the uneventful intravenous administration of iodinated contrast.  Reconstructed coronal and sagittal images were also obtained. Automated exposure control and iterative construction methods were  used. Findings: The central tracheobronchial tree is clear. The lungs are clear. There is no pleural effusion. The heart size appears normal. The great vessels are normal in caliber. There is no evidence of pulmonary embolus. No abnormally enlarged lymph nodes are identified. Partial evaluation of the upper abdomen is unremarkable. No aggressive osseous lesions are identified.     Impression: 1.Negative for pulmonary embolus. 2.No acute cardiopulmonary process. Electronically Signed: Kendrick Samuels MD  8/3/2024 5:24 PM EDT  Workstation ID: [id number]    XR Chest 1 View    Result Date: 8/3/2024  XR CHEST 1 VW Date of Exam: 8/3/2024 2:20 PM EDT Indication: Chest Pain Triage Protocol Comparison: Chest radiograph dated 3/3/2021 Findings: The cardiomediastinal silhouette is within normal limits. Pulmonary vascularity appears normal. There is no focal airspace consolidation, pleural effusion, or pneumothorax. There are no acute osseous findings of the chest.     Impression: 1. No acute cardiopulmonary abnormality. Electronically Signed: Macho Rene  8/3/2024 2:40 PM EDT  Workstation ID: [id number]       Differential Diagnosis and Discussion:    Chest Pain:  Based on the patient's signs and symptoms, I considered aortic dissection, myocardial infaction, pulmonary embolism, cardiac tamponade, pericarditis, pneumothorax, musculoskeletal chest pain and other differential diagnosis as an etiology of the patient's chest pain.     All labs were reviewed and interpreted by me.  All X-rays impressions were independently interpreted by me.  EKG was interpreted by me.    MDM     Amount and/or Complexity of Data Reviewed  Clinical  lab tests: reviewed  Tests in the radiology section of CPT®: reviewed  Tests in the medicine section of CPT®: reviewed                 Patient Care Considerations:    CT ABDOMEN AND PELVIS: I considered ordering a CT scan of the abdomen and pelvis however patient has no significant signs of abdominal pain or tenderness or peritonitis.      Consultants/Shared Management Plan:    None    Social Determinants of Health:    Patient has presented with family members who are responsible, reliable and will ensure follow up care.      Disposition and Care Coordination:    Discharged: I considered escalation of care by admitting this patient to the hospital, however patient is stable, asymptomatic and her ancillary studies are unremarkable.    I have explained discharge medications and the need for follow up with the patient/caretakers. This was also printed in the discharge instructions. Patient was discharged with the following medications and follow up:      Medication List        New Prescriptions      esomeprazole 40 MG capsule  Commonly known as: nexIUM  Take 1 capsule by mouth Every Morning Before Breakfast.            Stop      clindamycin 300 MG capsule  Commonly known as: Cleocin     Cortisporin-TC 3.3-3-10-0.5 MG/ML otic suspension  Generic drug: neomycin-colistin-hydrocortisone-thonzonium               Where to Get Your Medications        These medications were sent to [id number] - [place], [place] - [address] AT [facility] - [phone]  - [phone] fx [address], [place] [address]      Phone: [phone]   esomeprazole 40 MG capsule      Misa Chavez, APRN  [address] [place] [address] [phone]    In 2 days         Final diagnoses:   Chest pain, unspecified type   Gastritis without bleeding, unspecified chronicity, unspecified gastritis type        ED Disposition       ED Disposition   Discharge    Condition   Stable     Comment   --               This medical record created using voice recognition software.             Liam Silva, DO  08/03/24 [id number]

## 2024-08-03 NOTE — DISCHARGE INSTRUCTIONS
Take Mylanta between meals at bedtime.  Take prescribed medication as directed.  Avoid caffeine, smoking, alcohol, nonsteroidal anti-inflammatory such as ibuprofen.  Follow-up with your doctor in 2 days if no better.

## 2024-08-04 LAB
QT INTERVAL: 388 MS
QTC INTERVAL: 404 MS

## 2024-08-06 ENCOUNTER — HOSPITAL ENCOUNTER (OUTPATIENT)
Dept: CT IMAGING | Facility: HOSPITAL | Age: 44
Discharge: HOME | End: 2024-08-06
Admitting: FAMILY MEDICINE
Payer: OTHER GOVERNMENT

## 2024-08-06 DIAGNOSIS — H66.90 CHRONIC OTITIS MEDIA, UNSPECIFIED OTITIS MEDIA TYPE: ICD-10-CM

## 2024-08-06 DIAGNOSIS — R68.84 JAW PAIN: ICD-10-CM

## 2024-08-06 DIAGNOSIS — H66.91 RIGHT OTITIS MEDIA, UNSPECIFIED OTITIS MEDIA TYPE: ICD-10-CM

## 2024-08-06 PROCEDURE — 25510000001 IOPAMIDOL PER 1 ML: Performed by: FAMILY MEDICINE

## 2024-08-06 PROCEDURE — 70491 CT SOFT TISSUE NECK W/DYE: CPT

## 2024-08-06 RX ADMIN — IOPAMIDOL 100 ML: 755 INJECTION, SOLUTION INTRAVENOUS at 16:14

## 2024-08-08 NOTE — PROGRESS NOTES
CT neck shows possible thyroid nodule.  May need ultrasound of thyroid.  Follow-up in 1-2 weeks to discuss.

## 2024-08-16 ENCOUNTER — OFFICE VISIT (OUTPATIENT)
Dept: ORTHOPEDIC SURGERY | Facility: CLINIC | Age: 44
End: 2024-08-16
Payer: OTHER GOVERNMENT

## 2024-08-16 VITALS
BODY MASS INDEX: 27.05 KG/M2 | WEIGHT: 147 LBS | OXYGEN SATURATION: 97 % | HEIGHT: 62 IN | DIASTOLIC BLOOD PRESSURE: 79 MMHG | SYSTOLIC BLOOD PRESSURE: 114 MMHG | HEART RATE: 72 BPM

## 2024-08-16 DIAGNOSIS — S43.431D SUPERIOR GLENOID LABRUM LESION OF RIGHT SHOULDER, SUBSEQUENT ENCOUNTER: Primary | ICD-10-CM

## 2024-08-16 DIAGNOSIS — M25.811 IMPINGEMENT OF RIGHT SHOULDER: ICD-10-CM

## 2024-08-16 RX ORDER — LIDOCAINE HYDROCHLORIDE 10 MG/ML
5 INJECTION, SOLUTION INFILTRATION; PERINEURAL
Status: COMPLETED | OUTPATIENT
Start: 2024-08-16 | End: 2024-08-16

## 2024-08-16 RX ORDER — TRIAMCINOLONE ACETONIDE 40 MG/ML
40 INJECTION, SUSPENSION INTRA-ARTICULAR; INTRAMUSCULAR
Status: COMPLETED | OUTPATIENT
Start: 2024-08-16 | End: 2024-08-16

## 2024-08-16 RX ADMIN — TRIAMCINOLONE ACETONIDE 40 MG: 40 INJECTION, SUSPENSION INTRA-ARTICULAR; INTRAMUSCULAR at 15:15

## 2024-08-16 RX ADMIN — LIDOCAINE HYDROCHLORIDE 5 ML: 10 INJECTION, SOLUTION INFILTRATION; PERINEURAL at 15:15

## 2024-08-16 NOTE — PROGRESS NOTES
"Chief Complaint  Follow-up of the Right Shoulder    Subjective          Follow-up        Laxmi Jc is a 43 y.o. female  presents to [facility] for     Patient presents for follow-up evaluation of right shoulder pain, right shoulder SLAP tear and impingement.  She has had MRI of her right shoulder she is treating her shoulder conservatively with steroid injection and home exercises.  She was last seen on 3/28/2024 and states that the injection did help she states her shoulder is \"still good \"but states these she can tell the injection is wearing off.  She states she can lay on it now and states that range of motion is improved she states that she would like to have another right shoulder injection today      Allergies   Allergen Reactions    Floxin Otic [Ofloxacin] Other (See Comments)     Swelling of ear canal     Penicillins Rash and Other (See Comments)    Sulfa Antibiotics Rash and Other (See Comments)    Sulfamethoxazole-Trimethoprim Rash        Social History     Socioeconomic History    Marital status:    Tobacco Use    Smoking status: Former     Current packs/day: 0.50     Average packs/day: 0.5 packs/day for 6.6 years (3.3 ttl pk-yrs)     Types: Cigarettes     Start date: 2018     Passive exposure: Current    Smokeless tobacco: Never    Tobacco comments:     Quit 4-2024   Vaping Use    Vaping status: Never Used   Substance and Sexual Activity    Alcohol use: Never    Drug use: Never    Sexual activity: Defer        REVIEW OF SYSTEMS    Constitutional: Awake alert and oriented x3, no acute distress, denies fevers, chills, weight loss  Respiratory: No respiratory distress  Vascular: Brisk cap refill, Intact distal pulses, No cyanosis, compartments soft with no signs or symptoms of compartment syndrome or DVT.   Cardiovascular: Denies chest pain, shortness of breath  Skin: Denies rashes, acute skin changes  Neurologic: Denies headache, loss of consciousness  MSK: Right " "shoulder pain      Objective   Vital Signs:   /79   Pulse 72   Ht 157.5 cm (62.01\")   Wt 66.7 kg (147 lb)   SpO2 97%   BMI 26.88 kg/m²     Body mass index is 26.88 kg/m².    Physical Exam       Right shoulder: Nontender to palpation, no pain with range of motion, active forward elevation 175 active abduction 90 external rotation with abduction 80 internal rotation to her T12, mild pain with impingement testing.  5 out of 5 strength      Large Joint: R subacromial bursa  Date/Time: 8/16/2024 3:15 PM  Consent given by: patient  Site marked: site marked  Timeout: Immediately prior to procedure a time out was called to verify the correct patient, procedure, equipment, support staff and site/side marked as required   Supporting Documentation  Indications: pain   Procedure Details  Location: shoulder - R subacromial bursa  Preparation: Patient was prepped and draped in the usual sterile fashion  Needle gauge: 21g.  Medications administered: 5 mL lidocaine 1 %; 40 mg triamcinolone acetonide 40 MG/ML  Patient tolerance: patient tolerated the procedure well with no immediate complications      This injection documentation was Scribed for LORAINE Mcintosh by Janay Ho MA.  08/16/24   15:16 EDT    Imaging Results (Most Recent)       None             Result Review :   The following data was reviewed by: LORAINE Mcintosh on 08/16/2024:               Assessment and Plan    Diagnoses and all orders for this visit:    1. 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 Impingement of right shoulder    Other orders  -     Large Joint: R subacromial bursa        Discussed diagnosis and treatment options with the patient she elected to have right shoulder steroid injection which she tolerated well follow-up in 3 months    Call or return if worsening symptoms.    Follow Up   Return in about 3 months (around 11/16/2024) for Recheck.  Patient was given " instructions and counseling regarding her condition or for health maintenance advice. Please see specific information pulled into the AVS if appropriate.       EMR Dragon/Transcription disclaimer:  Part of this note may be an electronic transcription/translation of spoken language to printed text using the Dragon Dictation System

## 2024-08-28 NOTE — PROGRESS NOTES
"Chief Complaint: Urinary Incontinence, kidney stones, and Blood in Urine    Subjective         History of Present Illness  Laxmi Jc is a 43 y.o. female presents to [facility] to be seen for follow-up.      Patient was previously seen by Dr. Janay Us with last visit on 5/2/2022 for OAB/hematuria/renal stones.  At that visit she was started on oxybutynin advised to have follow-up in 1 year.  She has not been seen since that time.    She reports she was previously on topamax which is a known cause of kidney stones- she has a history of stones and did not want to go through that again.     Frequency-was prior to her hysterectomy    Urgency-denies    Incontinence-with cough, fell today and voided- was dribbling without urgency prior to hysterectomy    Nocturia-denies    GH-still having bleeding from hysterectomy    History of stones-admits     surgeries-lithotripsy    Family history of  malignancy-denies    Cardiopulmonary-tachycardia since covid vaccine- toprol manages this    Anticoagulants-denies    Smoker-denie- quit 2024      Previous 5/2/2022:  The patient reports she is doing well. She reports she has not had any stones that she is aware of. She reports she has not had any x-rays. The patient reports after her last kidney stones in 2019, she did well with urination for a \"long while\". She states she feels like she is not producing enough urine.  She reports she is not having trouble emptying her bladder.  The patient states she was told that her bladder was actually empty. She reports she feels like she has to urinate all day, but when she goes it is very little urine output. She reports when she wakes in the morning she has a strong urgency to urinate. The patient admits she has leakage if she laughs too hard. She reports she urinates a lot and then if she gets up, she still feels like she has to urinate. The patient reports she stopped drinking soda after she had kidney " stones. She states she only drinks soda if she goes out to eat. She denies any other issues. The patient is agreeable to start oxybutynin and x-rays to rule out kidney stones.           Objective     Past Medical History:   Diagnosis Date    Adhesive capsulitis of left shoulder     Anxiety     HX OF NO CURRENT ISSUES AND NOT ON ANY MEDICATIONS    Low back pain     HAS HAD 1 EPISODE OF LOWER BACK PAIN HAD SPRAINED IT. NO CURRENT ISSUES    Migraines     Palpitations     DENIES CP/SOA. REPORTS STARTED POST COVID.  FOLLOWED BY DR GUADARRAMA YEARLY. ACTIVE    Pituitary adenoma     FOLLOWED BY DR ESTEBAN NEUROLOGY    Polymenorrhea     PT REPORTS HAS PERIODS ABOUT EVERY 2 WEEKS    Scleroderma     nonreactive    Tear of left rotator cuff        Past Surgical History:   Procedure Laterality Date    BREAST BIOPSY Left     FAT GRAFTING Left     REPAIR OF EXCISIONAL BX LEFT    HYSTERECTOMY  07/23/2024    KIDNEY STONE SURGERY      OVARIAN CYST REMOVAL      SHOULDER ARTHROSCOPY W/ ROTATOR CUFF REPAIR Left 08/21/2023    Procedure: LEFT SHOULDER MANIPULATION,  ARTHROSCOPY ROTATOR CUFF DEBRIDEMENT AND SUBACROMIAL DECOMPRESSION , labral debridement, subpectoral biceps tenodesis;  Surgeon: Jv Norton MD;  [place] [facility] [place]  Service: Orthopedics;  Laterality: Left;         Current Outpatient Medications:     cetirizine (zyrTEC) 10 MG tablet, Take 1 tablet by mouth Daily., Disp: 90 tablet, Rfl: 3    esomeprazole (nexIUM) 40 MG capsule, Take 1 capsule by mouth Every Morning Before Breakfast., Disp: 20 capsule, Rfl: 0    ibuprofen (ADVIL,MOTRIN) 800 MG tablet, TAKE 1 TABLET EVERY 8 HOURS AS NEEDED FOR MILD PAIN, Disp: 180 tablet, Rfl: 5    metoprolol succinate XL (TOPROL-XL) 25 MG 24 hr tablet, TAKE 1 TABLET DAILY, Disp: 90 tablet, Rfl: 3    traMADol (ULTRAM) 50 MG tablet, Take 1 tablet by mouth. As needed (Patient not taking: Reported on 8/16/2024), Disp: , Rfl:     Allergies   Allergen Reactions    Floxin Otic [Ofloxacin]  "Other (See Comments)     Swelling of ear canal     Penicillins Rash and Other (See Comments)    Sulfa Antibiotics Rash and Other (See Comments)    Sulfamethoxazole-Trimethoprim Rash        Family History   Problem Relation Age of Onset    Cancer Mother         Colon    Kidney disease Mother     Hepatitis Mother     Autoimmune disease Mother     Bleeding Disorder Mother     Hyperlipidemia Father     Hypertension Father     Diabetes Father     Stroke Father     Cancer Maternal Grandmother         Stomach    Malig Hyperthermia Neg Hx        Social History     Socioeconomic History    Marital status:    Tobacco Use    Smoking status: Former     Current packs/day: 0.50     Average packs/day: 0.5 packs/day for 6.7 years (3.3 ttl pk-yrs)     Types: Cigarettes     Start date: 2018     Passive exposure: Current    Smokeless tobacco: Never    Tobacco comments:     Quit 4-2024   Vaping Use    Vaping status: Never Used   Substance and Sexual Activity    Alcohol use: Never    Drug use: Never    Sexual activity: Defer       Vital Signs:   /76 (BP Location: Left arm, Patient Position: Sitting, Cuff Size: Adult)   Pulse 79   Ht 157.5 cm (62.01\")   Wt 66.7 kg (147 lb 0.8 oz)   BMI 26.89 kg/m²      Physical Exam  Vitals reviewed.   Constitutional:       Appearance: Normal appearance.   Neurological:      General: No focal deficit present.      Mental Status: She is alert and oriented to person, place, and time.   Psychiatric:         Mood and Affect: Mood normal.         Behavior: Behavior normal.          Result Review :   The following data was reviewed by: ISAAC Le on 08/30/2024:  Results for orders placed or performed in visit on 08/30/24   Bladder Scan   Result Value Ref Range    Urine Volume 0           Bladder Scan interpretation 08/30/2024    Estimation of residual urine via BVI 3000 Verathon Bladder Scan  MA/nurse performing: Bridget JORDAN MA  Residual Urine: 0 ml  Indication: Urinary " incontinence, unspecified type   Position: Supine  Examination: Incremental scanning of the suprapubic area using 2.0 MHz transducer using copious amounts of acoustic gel.   Findings: An anechoic area was demonstrated which represented the bladder, with measurement of residual urine as noted. I inspected this myself. In that the residual urine was stable or insignificant, refer to plan for treatment and plan necessary at this time.         Procedures        Assessment and Plan    Diagnoses and all orders for this visit:    1. Urinary incontinence, unspecified type (Primary)  -     Bladder Scan    Given that her incontinence is improving, we will give her more time to heal from her hysterectomy before we do any further workup on that.    She does report she has had blood in her urine every time that she has had it checked, but she is still bleeding from her recent hysterectomy so cannot evaluate this at this time.    I will plan to see her back in 6 months and we will do a microscopy at that time and then proceed with hematuria workup if needed.    Follow Up   No follow-ups on file.  Patient was given instructions and counseling regarding her condition or for health maintenance advice. Please see specific information pulled into the AVS if appropriate.         This document has been electronically signed by Fannie Reyes, ISAAC  August 30, 2024 15:43 EDT

## 2024-08-30 ENCOUNTER — OFFICE VISIT (OUTPATIENT)
Dept: UROLOGY | Facility: CLINIC | Age: 44
End: 2024-08-30
Payer: OTHER GOVERNMENT

## 2024-08-30 VITALS
SYSTOLIC BLOOD PRESSURE: 119 MMHG | WEIGHT: 147.05 LBS | HEIGHT: 62 IN | DIASTOLIC BLOOD PRESSURE: 76 MMHG | BODY MASS INDEX: 27.06 KG/M2 | HEART RATE: 79 BPM

## 2024-08-30 DIAGNOSIS — R32 URINARY INCONTINENCE, UNSPECIFIED TYPE: Primary | ICD-10-CM

## 2024-08-30 LAB — URINE VOLUME: 0

## 2024-10-10 DIAGNOSIS — R00.2 PALPITATIONS: ICD-10-CM

## 2024-10-11 RX ORDER — METOPROLOL SUCCINATE 25 MG/1
25 TABLET, EXTENDED RELEASE ORAL DAILY
Qty: 90 TABLET | Refills: 3 | Status: SHIPPED | OUTPATIENT
Start: 2024-10-11

## 2024-10-11 NOTE — TELEPHONE ENCOUNTER
Rx Refill Note  Requested Prescriptions     Pending Prescriptions Disp Refills    metoprolol succinate XL (TOPROL-XL) 25 MG 24 hr tablet [Pharmacy Med Name: METOPROLOL SUCCINATE ER TABS 25MG] 90 tablet 3     Sig: TAKE 1 TABLET DAILY        LAST OFFICE VISIT:  03/08/2024     NEXT OFFICE VISIT:  3/17/2025     Does the medication requests match the last office note:    [x] Yes   [] No    Does this refill request meet protocol details for MA to approve:     [x] Yes   [] No

## 2024-11-05 ENCOUNTER — TELEPHONE (OUTPATIENT)
Dept: FAMILY MEDICINE CLINIC | Facility: CLINIC | Age: 44
End: 2024-11-05
Payer: OTHER GOVERNMENT

## 2024-11-05 NOTE — TELEPHONE ENCOUNTER
Caller: Laxmi Jc    Relationship: Self    Best call back number: [phone]    What is the medical concern/diagnosis: NEEDS A NEW REFERRAL FOR RHEUMATOLOGY     What specialty or service is being requested: RHEUMATOLOGY     Any additional details: HAS AN APPOINTMENT SOON AND NEEDS THIS UPDATED.

## 2024-11-11 ENCOUNTER — OFFICE VISIT (OUTPATIENT)
Dept: FAMILY MEDICINE CLINIC | Facility: CLINIC | Age: 44
End: 2024-11-11
Payer: OTHER GOVERNMENT

## 2024-11-11 VITALS
WEIGHT: 153 LBS | TEMPERATURE: 97.3 F | HEIGHT: 62 IN | BODY MASS INDEX: 28.16 KG/M2 | OXYGEN SATURATION: 98 % | DIASTOLIC BLOOD PRESSURE: 82 MMHG | SYSTOLIC BLOOD PRESSURE: 118 MMHG | HEART RATE: 84 BPM

## 2024-11-11 DIAGNOSIS — H66.93 BILATERAL OTITIS MEDIA, UNSPECIFIED OTITIS MEDIA TYPE: Primary | ICD-10-CM

## 2024-11-11 DIAGNOSIS — H93.8X3 EAR SWELLING, BILATERAL: ICD-10-CM

## 2024-11-11 PROCEDURE — 99214 OFFICE O/P EST MOD 30 MIN: CPT

## 2024-11-11 RX ORDER — SOMATROPIN 15 MG/1.5ML
INJECTION, SOLUTION SUBCUTANEOUS
COMMUNITY
Start: 2024-10-29

## 2024-11-11 RX ORDER — PEN NEEDLE, DIABETIC 32GX 5/32"
NEEDLE, DISPOSABLE MISCELLANEOUS
COMMUNITY
Start: 2024-10-27

## 2024-11-11 RX ORDER — AZITHROMYCIN 500 MG/1
500 TABLET, FILM COATED ORAL DAILY
Qty: 5 TABLET | Refills: 0 | Status: SHIPPED | OUTPATIENT
Start: 2024-11-11 | End: 2024-11-16

## 2024-11-11 RX ORDER — METHYLPREDNISOLONE 4 MG/1
TABLET ORAL
Qty: 1 EACH | Refills: 0 | Status: SHIPPED | OUTPATIENT
Start: 2024-11-11

## 2024-11-11 RX ORDER — FLUCONAZOLE 150 MG/1
150 TABLET ORAL ONCE
Qty: 1 TABLET | Refills: 0 | Status: SHIPPED | OUTPATIENT
Start: 2024-11-11 | End: 2024-11-11

## 2024-11-11 RX ORDER — SEMAGLUTIDE 0.25 MG/.5ML
INJECTION, SOLUTION SUBCUTANEOUS
COMMUNITY
Start: 2024-10-20

## 2024-11-11 NOTE — PROGRESS NOTES
"Chief Complaint  Facial Swelling (Bilateral ear swelling this morning, itching and burning x 3 days)      History of Present Illness:  Laxmi Jc is a 44 y.o. female who presents to [facility] with a past medical history of  Past Medical History:   Diagnosis Date    Adhesive capsulitis of left shoulder     Anxiety     HX OF NO CURRENT ISSUES AND NOT ON ANY MEDICATIONS    Low back pain     HAS HAD 1 EPISODE OF LOWER BACK PAIN HAD SPRAINED IT. NO CURRENT ISSUES    Migraines     Palpitations     DENIES CP/SOA. REPORTS STARTED POST COVID.  FOLLOWED BY DR GUADARRAMA YEARLY. ACTIVE    Pituitary adenoma     FOLLOWED BY DR ESTEBAN NEUROLOGY    Polymenorrhea     PT REPORTS HAS PERIODS ABOUT EVERY 2 WEEKS    Scleroderma     nonreactive    Tear of left rotator cuff    Laxmi presents today with complaints of ear pain, with itching, burning for 3 days.  She reports she is very swollen.  She reports she has had this several times and it is recurrent.  She reports she has seen ENT several times for this.  She reports she is not able to take topical eardrops as it causes reactions.  She is very sensitive.  She reports oral antibiotics worked the best last time when this occurred.      Objective   Vital Signs:   Vitals:    11/11/24 1354   BP: 118/82   Pulse: 84   Temp: 97.3 °F (36.3 °C)   SpO2: 98%   Weight: 69.4 kg (153 lb)   Height: 157.5 cm (62\")     Body mass index is 27.98 kg/m².    Wt Readings from Last 3 Encounters:   11/11/24 69.4 kg (153 lb)   08/30/24 66.7 kg (147 lb 0.8 oz)   08/16/24 66.7 kg (147 lb)     BP Readings from Last 3 Encounters:   11/11/24 118/82   08/30/24 119/76   08/16/24 114/79       Health Maintenance   Topic Date Due    INFLUENZA VACCINE  08/01/2024    COVID-19 Vaccine (3 - 2024-25 season) 09/01/2024    ANNUAL PHYSICAL  01/17/2025    BMI FOLLOWUP  10/20/2025    MAMMOGRAM  02/12/2026    TDAP/TD VACCINES (2 - Td or Tdap) 07/20/2028    HEPATITIS C SCREENING  Completed    " Pneumococcal Vaccine 0-64  Aged Out       Review of Systems   Physical Exam  Vitals reviewed.   Constitutional:       Appearance: Normal appearance.   HENT:      Right Ear: Drainage, swelling and tenderness present.      Left Ear: Drainage, swelling and tenderness present.   Eyes:      Pupils: Pupils are equal, round, and reactive to light.   Cardiovascular:      Rate and Rhythm: Normal rate and regular rhythm.   Pulmonary:      Effort: Pulmonary effort is normal.      Breath sounds: Normal breath sounds.   Skin:     General: Skin is warm and dry.   Neurological:      General: No focal deficit present.      Mental Status: She is alert and oriented to person, place, and time.   Psychiatric:         Mood and Affect: Mood normal.            Result Review :  The following data was reviewed by: ISAAC Ye on 11/11/2024:  No visits with results within 1 Month(s) from this visit.   Latest known visit with results is:   Office Visit on 08/30/2024   Component Date Value    Urine Volume 08/30/2024 0        Procedures        Assessment and Plan   Diagnoses and all orders for this visit:    1. Bilateral otitis media, unspecified otitis media type (Primary)  -     azithromycin (Zithromax) 500 MG tablet; Take 1 tablet by mouth Daily for 5 days.  Dispense: 5 tablet; Refill: 0  -     methylPREDNISolone (MEDROL) 4 MG dose pack; Take as directed on package instructions.  Dispense: 1 each; Refill: 0  -     fluconazole (Diflucan) 150 MG tablet; Take 1 tablet by mouth 1 (One) Time for 1 dose.  Dispense: 1 tablet; Refill: 0    2. Ear swelling, bilateral        BMI is >= 25 and <30. (Overweight) The following options were offered after discussion;: information on healthy weight added to patient's after visit summary          FOLLOW UP  Return if symptoms worsen or fail to improve.    Went over standard treatment and what up to date warranted as appropriate treatment.  She declined topical antibiotics.  Sent over oral azithromycin  due to allergies as well as steroids to help reduce inflammation.  Went ahead and sent Diflucan as she has a history of vaginal candidiasis.  Did encourage her to go to the ER if she was to have any throat swelling or worsening of symptoms.    Patient was given instructions and counseling regarding her condition or for health maintenance advice. Please see specific information pulled into the AVS if appropriate.       Bambi Burgos, APRN  11/11/24  17:43 EST    CURRENT & DISCONTINUED MEDICATIONS  Current Outpatient Medications   Medication Instructions    azithromycin (ZITHROMAX) 500 mg, Oral, Daily    BD Pen Needle Jocelyn 2nd Gen 32G X 4 MM misc     cetirizine (ZYRTEC) 10 mg, Oral, Daily    fluconazole (DIFLUCAN) 150 mg, Oral, Once    ibuprofen (ADVIL,MOTRIN) 800 mg, Oral, Every 8 Hours PRN    methylPREDNISolone (MEDROL) 4 MG dose pack Take as directed on package instructions.    metoprolol succinate XL (TOPROL-XL) 25 mg, Oral, Daily    Norditropin FlexPro 15 MG/1.5ML solution pen-injector     Wegovy 0.25 MG/0.5ML solution auto-injector        Medications Discontinued During This Encounter   Medication Reason    esomeprazole (nexIUM) 40 MG capsule *Therapy completed    traMADol (ULTRAM) 50 MG tablet *Therapy completed        EMR Dragon/Transcription disclaimer:  Parts of this encounter note are electronic transcription/translation of spoken language to printed text. The electronic translation of spoken language may permit erroneous, or at times, nonsensical words or phrases to be inadvertently transcribed. Although I have reviewed the note for such errors, some may still exist.

## 2024-11-19 ENCOUNTER — OFFICE VISIT (OUTPATIENT)
Dept: ORTHOPEDIC SURGERY | Facility: CLINIC | Age: 44
End: 2024-11-19
Payer: OTHER GOVERNMENT

## 2024-11-19 VITALS
HEIGHT: 62 IN | HEART RATE: 85 BPM | BODY MASS INDEX: 28.16 KG/M2 | SYSTOLIC BLOOD PRESSURE: 121 MMHG | DIASTOLIC BLOOD PRESSURE: 77 MMHG | WEIGHT: 153 LBS | OXYGEN SATURATION: 98 %

## 2024-11-19 DIAGNOSIS — M25.811 IMPINGEMENT OF RIGHT SHOULDER: ICD-10-CM

## 2024-11-19 DIAGNOSIS — S43.431D SUPERIOR GLENOID LABRUM LESION OF RIGHT SHOULDER, SUBSEQUENT ENCOUNTER: Primary | ICD-10-CM

## 2024-11-19 DIAGNOSIS — S43.431A SUPERIOR GLENOID LABRUM LESION OF RIGHT SHOULDER, INITIAL ENCOUNTER: ICD-10-CM

## 2024-11-19 PROCEDURE — 20610 DRAIN/INJ JOINT/BURSA W/O US: CPT | Performed by: PHYSICIAN ASSISTANT

## 2024-11-19 PROCEDURE — 99213 OFFICE O/P EST LOW 20 MIN: CPT | Performed by: PHYSICIAN ASSISTANT

## 2024-11-19 RX ORDER — LIDOCAINE HYDROCHLORIDE 10 MG/ML
5 INJECTION, SOLUTION INFILTRATION; PERINEURAL
Status: COMPLETED | OUTPATIENT
Start: 2024-11-19 | End: 2024-11-19

## 2024-11-19 RX ORDER — TRIAMCINOLONE ACETONIDE 40 MG/ML
40 INJECTION, SUSPENSION INTRA-ARTICULAR; INTRAMUSCULAR
Status: COMPLETED | OUTPATIENT
Start: 2024-11-19 | End: 2024-11-19

## 2024-11-19 RX ADMIN — LIDOCAINE HYDROCHLORIDE 5 ML: 10 INJECTION, SOLUTION INFILTRATION; PERINEURAL at 15:43

## 2024-11-19 RX ADMIN — TRIAMCINOLONE ACETONIDE 40 MG: 40 INJECTION, SUSPENSION INTRA-ARTICULAR; INTRAMUSCULAR at 15:43

## 2024-11-19 NOTE — PROGRESS NOTES
Chief Complaint  Follow-up of the Right Shoulder    Subjective          History of Present Illness      Laxmi Jc is a 44 y.o. female  presents to [facility] for     Patient presents for follow-up evaluation of right shoulder pain right shoulder SLAP tear and impingement.  She was last seen in August and received a right shoulder steroid injection she states the injection helped it has worn off recently.  She states her bicep region is where she has most pain.  Working out doing activities as tolerated she states she is able to do most activities of daily without trouble, no new complaints, she states pain is similar to past episodes.      Allergies   Allergen Reactions    Floxin Otic [Ofloxacin] Other (See Comments)     Swelling of ear canal     Penicillins Rash and Other (See Comments)    Sulfa Antibiotics Rash and Other (See Comments)    Sulfamethoxazole-Trimethoprim Rash        Social History     Socioeconomic History    Marital status:    Tobacco Use    Smoking status: Former     Current packs/day: 0.00     Average packs/day: 0.5 packs/day for 6.2 years (3.1 ttl pk-yrs)     Types: Cigarettes     Start date:      Quit date: 3/1/2024     Years since quittin.7     Passive exposure: Current    Smokeless tobacco: Never    Tobacco comments:     Quit -   Vaping Use    Vaping status: Never Used   Substance and Sexual Activity    Alcohol use: Never    Drug use: Never    Sexual activity: Defer        REVIEW OF SYSTEMS    Constitutional: Awake alert and oriented x3, no acute distress, denies fevers, chills, weight loss  Respiratory: No respiratory distress  Vascular: Brisk cap refill, Intact distal pulses, No cyanosis, compartments soft with no signs or symptoms of compartment syndrome or DVT.   Cardiovascular: Denies chest pain, shortness of breath  Skin: Denies rashes, acute skin changes  Neurologic: Denies headache, loss of consciousness  MSK: Right shoulder  "pain      Objective   Vital Signs:   /77   Pulse 85   Ht 157.5 cm (62\")   Wt 69.4 kg (153 lb)   SpO2 98%   BMI 27.98 kg/m²     Body mass index is 27.98 kg/m².    Physical Exam       Right shoulder: Mild tenderness palpation anterior lateral shoulder active forward elevation 175 active abduction 140 external rotation with abduction 85 internal rotation to T10, 5 out of 5 supraspinatus 5 out of 5 infraspinatus and subscapularis, neurovascularly intact      Large Joint: R subacromial bursa  Date/Time: 11/19/2024 3:43 PM  Consent given by: patient  Site marked: site marked  Timeout: Immediately prior to procedure a time out was called to verify the correct patient, procedure, equipment, support staff and site/side marked as required   Supporting Documentation  Indications: pain   Procedure Details  Location: shoulder - R subacromial bursa  Preparation: Patient was prepped and draped in the usual sterile fashion  Needle gauge: 21 G.  Medications administered: 5 mL lidocaine 1 %; 40 mg triamcinolone acetonide 40 MG/ML  Patient tolerance: patient tolerated the procedure well with no immediate complications    This injection documentation was Scribed for LORAINE Mcintosh by Bambi Berry MA.  11/19/24   15:44 EST    Imaging Results (Most Recent)       None             Result Review :   The following data was reviewed by: LORAINE Mcintosh on 11/19/2024:               Assessment and Plan    Diagnoses and all orders for this visit:    1. 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 Impingement of right shoulder    3. Superior glenoid labrum lesion of right shoulder, initial encounter        Discussed diagnosis and treatment options with the patient she elected to have right shoulder steroid injection.  A sterile prep of the injection site was performed with chloraprep. Cryospray was used for local anesthesia. The site was injected. The patient " tolerated the procedure well without complications. Post injection pain was discussed.    The risks, benefits, complications, treatment options/alternatives, and expected outcomes/goals were discussed with the patient.   Follow-up 3 months    Call or return if worsening symptoms.    Follow Up   Return in about 3 months (around 2/19/2025) for Recheck.  Patient was given instructions and counseling regarding her condition or for health maintenance advice. Please see specific information pulled into the AVS if appropriate.       EMR Dragon/Transcription disclaimer:  Part of this note may be an electronic transcription/translation of spoken language to printed text using the Dragon Dictation System

## 2024-11-25 ENCOUNTER — OFFICE VISIT (OUTPATIENT)
Dept: FAMILY MEDICINE CLINIC | Facility: CLINIC | Age: 44
End: 2024-11-25
Payer: OTHER GOVERNMENT

## 2024-11-25 VITALS
HEIGHT: 62 IN | DIASTOLIC BLOOD PRESSURE: 82 MMHG | TEMPERATURE: 97.7 F | HEART RATE: 106 BPM | SYSTOLIC BLOOD PRESSURE: 126 MMHG | WEIGHT: 155 LBS | BODY MASS INDEX: 28.52 KG/M2 | OXYGEN SATURATION: 99 %

## 2024-11-25 DIAGNOSIS — K52.9 POSTPRANDIAL DIARRHEA: ICD-10-CM

## 2024-11-25 DIAGNOSIS — H60.8X3 CHRONIC ECZEMATOUS OTITIS EXTERNA OF BOTH EARS: Primary | ICD-10-CM

## 2024-11-25 PROCEDURE — 99214 OFFICE O/P EST MOD 30 MIN: CPT | Performed by: NURSE PRACTITIONER

## 2024-11-25 RX ORDER — CEFDINIR 300 MG/1
CAPSULE ORAL
COMMUNITY
Start: 2024-11-23 | End: 2024-11-25

## 2024-11-25 RX ORDER — METOPROLOL SUCCINATE 25 MG/1
25 TABLET, EXTENDED RELEASE ORAL DAILY
COMMUNITY

## 2024-11-25 RX ORDER — FLUOCINOLONE ACETONIDE 0.11 MG/ML
OIL AURICULAR (OTIC)
Qty: 20 ML | Refills: 0 | Status: SHIPPED | OUTPATIENT
Start: 2024-11-25

## 2024-11-25 RX ORDER — CLINDAMYCIN HYDROCHLORIDE 300 MG/1
300 CAPSULE ORAL 3 TIMES DAILY
Qty: 21 CAPSULE | Refills: 0 | Status: SHIPPED | OUTPATIENT
Start: 2024-11-25

## 2024-11-25 NOTE — PROGRESS NOTES
Chief Complaint  Earache (Lt>Rt-4 days)    History of Present Illness  Laxmi Jc is a 44 y.o. female who presents to [facility] with a past medical history of    Past Medical History:   Diagnosis Date    Adhesive capsulitis of left shoulder     Anxiety     HX OF NO CURRENT ISSUES AND NOT ON ANY MEDICATIONS    Low back pain     HAS HAD 1 EPISODE OF LOWER BACK PAIN HAD SPRAINED IT. NO CURRENT ISSUES    Migraines     Palpitations     DENIES CP/SOA. REPORTS STARTED POST COVID.  FOLLOWED BY DR GUADARRAMA YEARLY. ACTIVE    Pituitary adenoma     FOLLOWED BY DR ESTEABN [facility]UROLOGY    Polymenorrhea     PT REPORTS HAS PERIODS ABOUT EVERY 2 WEEKS    Scleroderma     nonreactive    Tear of left rotator cuff        History of Present Illness  The patient is a 44-year-old female who presents to the office today to follow up on bilateral otitis media. The initial office visit occurred on 11/11/2024. At that time, she was prescribed azithromycin, Medrol Dosepak, and fluconazole for yeast prophylaxis.    Since that appointment [facility]  They prescribed Omnicef; however, she states that she did not /take that prescription.  Overall, she was very dissatisfied with the treatment at that appointment.    She reports persistent discomfort in both ears, with her left ear exhibiting significant swelling. Despite completing her medication regimen on 11/11/2024, she continues to experience residual symptoms. She has been unable to administer prescription eardrops in the past due to an adverse reaction characterized by severe ear swelling. She has sought consultation from three different ENT specialists, all of whom have managed her condition similarly.     She denies any known history of having eczema or psoriasis in her ears, but does report a history of psoriasis and hair loss.    Additionally, she states that she recently saw a gastroenterologist through [place] [facility]  "healthcare.  They did endoscopy and some lab work and could not determine a cause for her postprandial diarrhea.  She discussed her situation with her mother, and her mother informed her that there is a family history of gallbladder carcinoma.  She has not had evaluation of her gallbladder.  She denies any nausea or vomiting.  She is usually without any postprandial abdominal pain.  She states diarrhea is after eating any food and is comparable to muddy water.    ALLERGIES  The patient is allergic to PENICILLIN and SULFA, which cause a full body rash.    MEDICATIONS  Current: Metoprolol.  Discontinued: Azithromycin, Medrol Dosepak, fluconazole.      Objective   Vital Signs:   Vitals:    11/25/24 1434   BP: 126/82   BP Location: Left arm   Pulse: 106   Temp: 97.7 °F (36.5 °C)   TempSrc: Temporal   SpO2: 99%   Weight: 70.3 kg (155 lb)   Height: 157.5 cm (62\")     Body mass index is 28.35 kg/m².    Wt Readings from Last 3 Encounters:   11/25/24 70.3 kg (155 lb)   11/19/24 69.4 kg (153 lb)   11/11/24 69.4 kg (153 lb)     BP Readings from Last 3 Encounters:   11/25/24 126/82   11/19/24 121/77   11/11/24 118/82       Health Maintenance   Topic Date Due    INFLUENZA VACCINE  08/01/2024    COVID-19 Vaccine (3 - 2024-25 season) 09/01/2024    ANNUAL PHYSICAL  01/17/2025    BMI FOLLOWUP  11/11/2025    MAMMOGRAM  02/12/2026    TDAP/TD VACCINES (2 - Td or Tdap) 07/20/2028    HEPATITIS C SCREENING  Completed    Pneumococcal Vaccine 0-64  Aged Out       Physical Exam  Vitals reviewed.   Constitutional:       General: She is not in acute distress.     Appearance: She is well-developed and overweight. She is not ill-appearing.   HENT:      Head: Normocephalic and atraumatic.      Right Ear: There is no impacted cerumen.      Left Ear: There is no impacted cerumen.      Ears:      Comments: Her right tympanic membrane is completely normal.  External auditory canal is open/patent.  There does appear to be mild erythema of the external " auditory canal with scant squamous debris.  The left TM is only partially visualized, but what is visualized appears normal.  There is swelling noted of the external auditory canal on the left.  There is mild erythema and squamous debris noted.     Nose: Nose normal.      Mouth/Throat:      Mouth: Mucous membranes are moist.      Pharynx: Oropharynx is clear. No oropharyngeal exudate or posterior oropharyngeal erythema.   Eyes:      General: No scleral icterus.        Right eye: No discharge.         Left eye: No discharge.      Extraocular Movements: Extraocular movements intact.      Conjunctiva/sclera: Conjunctivae normal.   Neck:      Trachea: Trachea normal.   Cardiovascular:      Rate and Rhythm: Normal rate and regular rhythm.      Pulses: Normal pulses.      Heart sounds: No murmur heard.  Pulmonary:      Effort: Pulmonary effort is normal.      Breath sounds: Normal breath sounds. No wheezing, rhonchi or rales.   Abdominal:      General: Bowel sounds are normal. There is no distension.      Palpations: Abdomen is soft. There is no mass.      Tenderness: There is no abdominal tenderness.   Musculoskeletal:         General: Normal range of motion.      Cervical back: Normal range of motion and neck supple. No tenderness.      Right lower leg: No edema.      Left lower leg: No edema.   Lymphadenopathy:      Cervical: No cervical adenopathy.   Skin:     General: Skin is warm and dry.   Neurological:      Mental Status: She is alert and oriented to person, place, and time.   Psychiatric:         Mood and Affect: Mood and affect normal.         Behavior: Behavior normal.         Thought Content: Thought content normal.         Judgment: Judgment normal.           Result Review :  The following data was reviewed by: ISAAC Hickey on 11/25/2024:    Common labs          1/17/2024    09:36 5/18/2024    02:51 5/18/2024    04:46 8/3/2024    14:17   Common Labs   Glucose 89    118    BUN 14    12     Creatinine 0.64    0.83    Sodium 136    134    Potassium 3.9    4.0    Chloride 101    96    Calcium 9.1    9.4    Albumin 4.2    4.1    Total Bilirubin <0.2    0.2    Alkaline Phosphatase 74    73    AST (SGOT) 12    11    ALT (SGPT) 11    11    WBC 7.08    13.84    Hemoglobin 13.3  12.5  12.4  11.2    Hematocrit 38.5  37.5  37.5  34.8    Platelets 336    540    Total Cholesterol 179       Triglycerides 51       HDL Cholesterol 53       LDL Cholesterol  116           Office Visit with Bambi Burgos APRN (11/11/2024)     Colonoscopy pathology reviewed via care everywhere from [facility] 3/15/2024.  Small bowel pathology normal.  Tubular adenoma removed x 2.  Random colon biopsies negative.    Procedures        Assessment and Plan   Diagnoses and all orders for this visit:    1. Chronic eczematous otitis externa of both ears (Primary)  -     clindamycin (Cleocin) 300 MG capsule; Take 1 capsule by mouth 3 (Three) Times a Day.  Dispense: 21 capsule; Refill: 0  -     fluocinolone acetonide (DERMOTIC) 0.01 % oil otic oil; Administer 5 drops into both ear BID for 7 days.  Dispense: 20 mL; Refill: 0    2. Postprandial diarrhea  -     US Abdomen Limited; Future        Assessment & Plan  1. Bilateral otitis media.  The patient reports persistent symptoms in both ears, with one ear significantly swollen and the other showing lingering symptoms after completing the initial medication regimen. Examination reveals a red and irritated ear canal, with mild squamous debris, but no obvious infection of the eardrum.  There is modest swelling of the left external auditory canal, but not significant enough to warrant a wick at this time.  She will be prescribed clindamycin to be taken three times daily for 7 days. Additionally, I will give her a trial of Derm otic to see if this will reduce the swelling, but also improve her recurrent symptoms of itching. She is advised to take a probiotic concurrently with the antibiotic  treatment.                FOLLOW UP  No follow-ups on file.    In regards to her postprandial diarrhea with unremarkable GI workup thus far, I will order an ultrasound of the abdomen to further assess her gallbladder.  If that is unremarkable we did discuss HIDA scan.  If HIDA is unremarkable then I would have her follow-up with GI to discuss workup for pancreatic insufficiency.    Patient was given instructions and counseling regarding her condition or for health maintenance advice. Please see specific information pulled into the AVS if appropriate.       ISAAC Hickey  11/25/24  15:25 EST    CURRENT & DISCONTINUED MEDICATIONS  Current Outpatient Medications   Medication Instructions    BD Pen Needle Jocelyn 2nd Gen 32G X 4 MM misc     cetirizine (ZYRTEC) 10 mg, Oral, Daily    clindamycin (CLEOCIN) 300 mg, Oral, 3 Times Daily    fluocinolone acetonide (DERMOTIC) 0.01 % oil otic oil Administer 5 drops into both ear BID for 7 days.    ibuprofen (ADVIL,MOTRIN) 800 mg, Oral, Every 8 Hours PRN    metoprolol succinate XL (TOPROL-XL) 25 mg, Oral, Daily    Norditropin FlexPro 15 MG/1.5ML solution pen-injector        Medications Discontinued During This Encounter   Medication Reason    metoprolol succinate XL (TOPROL-XL) 25 MG 24 hr tablet *Therapy completed    Wegovy 0.25 MG/0.5ML solution auto-injector *Therapy completed    methylPREDNISolone (MEDROL) 4 MG dose pack *Therapy completed    cefdinir (OMNICEF) 300 MG capsule Not Efficacious        Patient or patient representative verbalized consent for the use of Ambient Listening during the visit with  ISAAC Hickey for chart documentation. 11/25/2024  15:26 EST

## 2024-12-06 ENCOUNTER — HOSPITAL ENCOUNTER (OUTPATIENT)
Dept: ULTRASOUND IMAGING | Facility: HOSPITAL | Age: 44
Discharge: HOME | End: 2024-12-06
Admitting: NURSE PRACTITIONER
Payer: OTHER GOVERNMENT

## 2024-12-06 DIAGNOSIS — K52.9 POSTPRANDIAL DIARRHEA: ICD-10-CM

## 2024-12-06 PROCEDURE — 76705 ECHO EXAM OF ABDOMEN: CPT

## 2024-12-09 DIAGNOSIS — K52.9 POSTPRANDIAL DIARRHEA: Primary | ICD-10-CM

## 2024-12-19 ENCOUNTER — TELEPHONE (OUTPATIENT)
Dept: FAMILY MEDICINE CLINIC | Facility: CLINIC | Age: 44
End: 2024-12-19
Payer: OTHER GOVERNMENT

## 2024-12-19 ENCOUNTER — OFFICE VISIT (OUTPATIENT)
Dept: FAMILY MEDICINE CLINIC | Facility: CLINIC | Age: 44
End: 2024-12-19
Payer: OTHER GOVERNMENT

## 2024-12-19 VITALS
HEIGHT: 62 IN | SYSTOLIC BLOOD PRESSURE: 124 MMHG | DIASTOLIC BLOOD PRESSURE: 82 MMHG | OXYGEN SATURATION: 99 % | WEIGHT: 154 LBS | BODY MASS INDEX: 28.34 KG/M2 | HEART RATE: 93 BPM | TEMPERATURE: 97.9 F

## 2024-12-19 DIAGNOSIS — H66.23 CHRONIC ATTICOANTRAL SUPPURATIVE OTITIS MEDIA OF BOTH EARS: ICD-10-CM

## 2024-12-19 DIAGNOSIS — H71.02 CHOLESTEATOMA OF ATTIC OF LEFT EAR: Primary | ICD-10-CM

## 2024-12-19 PROCEDURE — 99214 OFFICE O/P EST MOD 30 MIN: CPT | Performed by: STUDENT IN AN ORGANIZED HEALTH CARE EDUCATION/TRAINING PROGRAM

## 2024-12-19 RX ORDER — SEMAGLUTIDE 0.25 MG/.5ML
INJECTION, SOLUTION SUBCUTANEOUS
COMMUNITY
Start: 2024-12-19

## 2024-12-19 RX ORDER — DOXYCYCLINE 100 MG/1
100 CAPSULE ORAL 2 TIMES DAILY
Qty: 28 CAPSULE | Refills: 0 | Status: SHIPPED | OUTPATIENT
Start: 2024-12-19 | End: 2025-01-02

## 2024-12-19 NOTE — PROGRESS NOTES
"Chief Complaint  Earache (Bilateral earache)    Subjective      Laxmi Jc is a 44 y.o. female who presents to [facility] with complaints of bilateral ear pain.  She has had several ear infections over the last year.  Has seen 3 separate ENTs, and has been told only that she has a \"dry ear.\"  She has undergone fiberoptic evaluation.  She has had a CT of the neck which showed mucous retention cysts of the maxillary sinuses.  She has not had dedicated sinus imaging.    She notes extensive drainage out of the ears, particularly the left.  Sometimes this drainage is copious and will pour out of the canal.  She also notes at times she will wake up with discharge/fluid on her pillow.  Her pain is fairly severe today.  She also notes swollen lymph nodes surrounding her ears.  She has difficulty with otic drops, says every drop she has tried has made her ears swell significantly.  Her symptoms are starting to interfere with her quality of life and she is frustrating that this continues to happen      Objective   Vital Signs:   Vitals:    12/19/24 1133   BP: 124/82   Pulse: 93   Temp: 97.9 °F (36.6 °C)   SpO2: 99%   Weight: 69.9 kg (154 lb)   Height: 157.5 cm (62\")     Body mass index is 28.17 kg/m².    Wt Readings from Last 3 Encounters:   12/19/24 69.9 kg (154 lb)   11/25/24 70.3 kg (155 lb)   11/19/24 69.4 kg (153 lb)     BP Readings from Last 3 Encounters:   12/19/24 124/82   11/25/24 126/82   11/19/24 121/77       Health Maintenance   Topic Date Due    INFLUENZA VACCINE  07/01/2024    COVID-19 Vaccine (3 - 2024-25 season) 12/19/2025 (Originally 9/1/2024)    ANNUAL PHYSICAL  01/17/2025    BMI FOLLOWUP  12/19/2025    MAMMOGRAM  02/12/2026    TDAP/TD VACCINES (2 - Td or Tdap) 07/20/2028    HEPATITIS C SCREENING  Completed    Pneumococcal Vaccine 0-64  Aged Out       Physical Exam  Constitutional:       Appearance: She is normal weight.   HENT:      Head: Normocephalic and atraumatic. "      Ears:      Comments: Left canal is fairly within normal limits, the TM is red and bulging with suppurative effusion noted behind as well as what appears to be a colesteatoma in the anterior superior region of the tympanic membrane, there is some drainage of serous fluid surrounding  Right canal is so swollen that I cannot insert the otoscope  Eyes:      Extraocular Movements: Extraocular movements intact.      Pupils: Pupils are equal, round, and reactive to light.   Cardiovascular:      Rate and Rhythm: Normal rate and regular rhythm.      Heart sounds: Normal heart sounds.   Pulmonary:      Effort: Pulmonary effort is normal.   Musculoskeletal:         General: Normal range of motion.      Cervical back: Normal range of motion and neck supple.   Skin:     General: Skin is warm and dry.   Neurological:      General: No focal deficit present.      Mental Status: She is alert.   Psychiatric:         Behavior: Behavior normal.          Result Review :  The following data was reviewed by: Lottie Hightower DO on 12/19/2024:         Procedures          ASSESSMENT/PLAN  Diagnoses and all orders for this visit:    1. 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 Chronic atticoantral suppurative otitis media of both ears  -     MRI Internal Auditory Canal With Wo; Future  -     doxycycline (MONODOX) 100 MG capsule; Take 1 capsule by mouth 2 (Two) Times a Day for 14 days. Do not take on empty stomach.  Dispense: 28 capsule; Refill: 0           Will do extended course of doxycycline and ordering dedicated internal auditory canal MRI.  Discussed with other providers in the office and although oral clindamycin has worked for her in the past I am afraid that an extended course of this may result in a C. difficile infection.  She has had a reported reaction to the ofloxacin otic drops so out of abundance of caution we will avoid Levaquin or moxifloxacin at this  time.  Considered oral maxillofacial surgery referral given maxillary mucous cysts but after looking at her imaging in more depth I am not totally sure that this referral is appropriate at this time.    Advised that she will likely have some stomach upset with the doxycycline.  She should not take this on an empty stomach.                   FOLLOW UP  Return for Recheck after imaging.  Patient was given instructions and counseling regarding her condition or for health maintenance advice. Please see specific information pulled into the AVS if appropriate.       Lottie Hightower,   12/19/24  17:22 EST

## 2025-01-20 ENCOUNTER — HOSPITAL ENCOUNTER (OUTPATIENT)
Dept: NUCLEAR MEDICINE | Facility: HOSPITAL | Age: 45
Discharge: HOME | End: 2025-01-20
Payer: OTHER GOVERNMENT

## 2025-01-20 DIAGNOSIS — K52.9 POSTPRANDIAL DIARRHEA: ICD-10-CM

## 2025-01-20 PROCEDURE — 78227 HEPATOBIL SYST IMAGE W/DRUG: CPT

## 2025-01-20 PROCEDURE — A9537 TC99M MEBROFENIN: HCPCS | Performed by: NURSE PRACTITIONER

## 2025-01-20 PROCEDURE — 34310000005 TECHNETIUM TC 99M MEBROFENIN KIT: Performed by: NURSE PRACTITIONER

## 2025-01-20 RX ORDER — KIT FOR THE PREPARATION OF TECHNETIUM TC 99M MEBROFENIN 45 MG/10ML
1 INJECTION, POWDER, LYOPHILIZED, FOR SOLUTION INTRAVENOUS
Status: COMPLETED | OUTPATIENT
Start: 2025-01-20 | End: 2025-01-20

## 2025-01-20 RX ADMIN — MEBROFENIN 1 DOSE: 45 INJECTION, POWDER, LYOPHILIZED, FOR SOLUTION INTRAVENOUS at 11:09

## 2025-01-27 ENCOUNTER — OFFICE VISIT (OUTPATIENT)
Dept: FAMILY MEDICINE CLINIC | Facility: CLINIC | Age: 45
End: 2025-01-27
Payer: OTHER GOVERNMENT

## 2025-01-27 VITALS
WEIGHT: 152 LBS | HEIGHT: 62 IN | OXYGEN SATURATION: 100 % | SYSTOLIC BLOOD PRESSURE: 124 MMHG | HEART RATE: 133 BPM | TEMPERATURE: 103.3 F | DIASTOLIC BLOOD PRESSURE: 82 MMHG | BODY MASS INDEX: 27.97 KG/M2

## 2025-01-27 DIAGNOSIS — J10.1 INFLUENZA B: Primary | ICD-10-CM

## 2025-01-27 DIAGNOSIS — R52 BODY ACHES: ICD-10-CM

## 2025-01-27 DIAGNOSIS — Z20.818 EXPOSURE TO STREP THROAT: ICD-10-CM

## 2025-01-27 LAB
EXPIRATION DATE: ABNORMAL
EXPIRATION DATE: NORMAL
FLUAV AG UPPER RESP QL IA.RAPID: NOT DETECTED
FLUBV AG UPPER RESP QL IA.RAPID: DETECTED
INTERNAL CONTROL: ABNORMAL
INTERNAL CONTROL: NORMAL
Lab: ABNORMAL
Lab: NORMAL
S PYO AG THROAT QL: NEGATIVE
SARS-COV-2 AG UPPER RESP QL IA.RAPID: NOT DETECTED

## 2025-01-27 PROCEDURE — 87428 SARSCOV & INF VIR A&B AG IA: CPT | Performed by: NURSE PRACTITIONER

## 2025-01-27 PROCEDURE — 99213 OFFICE O/P EST LOW 20 MIN: CPT | Performed by: NURSE PRACTITIONER

## 2025-01-27 PROCEDURE — 87880 STREP A ASSAY W/OPTIC: CPT | Performed by: NURSE PRACTITIONER

## 2025-01-27 RX ORDER — BROMPHENIRAMINE MALEATE, PSEUDOEPHEDRINE HYDROCHLORIDE, AND DEXTROMETHORPHAN HYDROBROMIDE 2; 30; 10 MG/5ML; MG/5ML; MG/5ML
10 SYRUP ORAL 4 TIMES DAILY PRN
Qty: 240 ML | Refills: 0 | Status: SHIPPED | OUTPATIENT
Start: 2025-01-27

## 2025-01-27 RX ORDER — OSELTAMIVIR PHOSPHATE 75 MG/1
75 CAPSULE ORAL 2 TIMES DAILY
Qty: 10 CAPSULE | Refills: 0 | Status: SHIPPED | OUTPATIENT
Start: 2025-01-27

## 2025-01-27 NOTE — PROGRESS NOTES
"Chief Complaint  Fever (Symptoms started this morning), Chills, Generalized Body Aches, Cough, and Vomiting    History of Present Illness  Laxmi Jc is a 44 y.o. female who presents to [facility] with a past medical history of    Past Medical History:   Diagnosis Date    Adhesive capsulitis of left shoulder     Anxiety     HX OF NO CURRENT ISSUES AND NOT ON ANY MEDICATIONS    Low back pain     HAS HAD 1 EPISODE OF LOWER BACK PAIN HAD SPRAINED IT. NO CURRENT ISSUES    Migraines     Palpitations     DENIES CP/SOA. REPORTS STARTED POST COVID.  FOLLOWED BY DR GUADARRAMA YEARLY. ACTIVE    Pituitary adenoma     FOLLOWED BY DR ESTEBAN NEUROLOGY    Polymenorrhea     PT REPORTS HAS PERIODS ABOUT EVERY 2 WEEKS    Scleroderma     nonreactive    Tear of left rotator cuff        History of Present Illness  The patient is a 44-year-old female who presents to the office today due to an acute illness.    She reports experiencing symptoms consistent with influenza, including vomiting, chills, body aches, and fever. The onset of these symptoms was at 5:00 AM this morning. She does not report any new onset ear pain or pressure.  She reports being referred to an ENT specialist at her most recent appointment, but she has not yet heard from anyone regarding an appointment date or time.  She also endorses nasal congestion with rhinorrhea, sore throat, and cough.  No wheezing or shortness of breath at present.  Her son had a positive strep test on Friday, but she did not have a sore throat at that time.      Objective   Vital Signs:   Vitals:    01/27/25 1435   BP: 124/82   BP Location: Left arm   Pulse: (!) 133   Temp: (!) 103.3 °F (39.6 °C)   TempSrc: Temporal   SpO2: 100%   Weight: 68.9 kg (152 lb)   Height: 157.5 cm (62\")     Body mass index is 27.8 kg/m².    Wt Readings from Last 3 Encounters:   01/27/25 68.9 kg (152 lb)   12/19/24 69.9 kg (154 lb)   11/25/24 70.3 kg (155 lb)     BP Readings from Last " 3 Encounters:   01/27/25 124/82   12/19/24 124/82   11/25/24 126/82       Health Maintenance   Topic Date Due    INFLUENZA VACCINE  07/01/2024    ANNUAL PHYSICAL  01/17/2025    COVID-19 Vaccine (3 - 2024-25 season) 12/19/2025 (Originally 9/1/2024)    BMI FOLLOWUP  12/19/2025    MAMMOGRAM  02/12/2026    TDAP/TD VACCINES (2 - Td or Tdap) 07/20/2028    HEPATITIS C SCREENING  Completed    Pneumococcal Vaccine 0-64  Aged Out       Physical Exam  Vitals reviewed.   Constitutional:       General: She is not in acute distress.     Appearance: She is well-developed and overweight. She is ill-appearing. She is not toxic-appearing or diaphoretic.   HENT:      Head: Normocephalic and atraumatic.      Right Ear: Tympanic membrane and external ear normal. There is no impacted cerumen.      Left Ear: Tympanic membrane and external ear normal. There is no impacted cerumen.      Nose: Congestion present. No rhinorrhea.      Mouth/Throat:      Mouth: Mucous membranes are moist.      Pharynx: Oropharynx is clear. Posterior oropharyngeal erythema present. No oropharyngeal exudate.   Eyes:      General: No scleral icterus.        Right eye: No discharge.         Left eye: No discharge.      Extraocular Movements: Extraocular movements intact.      Conjunctiva/sclera: Conjunctivae normal.   Neck:      Trachea: Trachea normal.   Cardiovascular:      Rate and Rhythm: Regular rhythm. Tachycardia present.      Pulses: Normal pulses.      Heart sounds: No murmur heard.  Pulmonary:      Effort: Pulmonary effort is normal.      Comments: No adventitious lung sounds on auscultation but cough witnessed.  Cough is congested and easily triggered with inspiration.  Abdominal:      General: Bowel sounds are normal. There is no distension.      Palpations: Abdomen is soft. There is no mass.      Tenderness: There is no abdominal tenderness.   Musculoskeletal:         General: Normal range of motion.      Cervical back: Normal range of motion and neck  supple. No tenderness.   Lymphadenopathy:      Cervical: No cervical adenopathy.   Skin:     General: Skin is warm and dry.   Neurological:      Mental Status: She is alert and oriented to person, place, and time.   Psychiatric:         Mood and Affect: Mood and affect normal.         Behavior: Behavior normal.         Thought Content: Thought content normal.         Judgment: Judgment normal.            Result Review :  The following data was reviewed by: ISAAC Hickey on 01/27/2025:    Office Visit on 01/27/2025   Component Date Value    SARS Antigen 01/27/2025 Not Detected     Influenza A Antigen [place]STON 01/27/2025 Not Detected     Influenza B Antigen WE[place]ON 01/27/2025 Detected (A)     Internal Control 01/27/2025 Passed     Lot Number 01/27/2025 [id number]     Expiration Date 01/27/2025 7,302,025     Rapid Strep A Screen 01/27/2025 Negative     Internal Control 01/27/2025 Passed     Lot Number 01/27/2025 [id number]     Expiration Date 01/27/2025 1,130,202        Procedures        Assessment and Plan   Diagnoses and all orders for this visit:    1. Influenza B (Primary)  -     oseltamivir (Tamiflu) 75 MG capsule; Take 1 capsule by mouth 2 (Two) Times a Day.  Dispense: 10 capsule; Refill: 0  -     brompheniramine-pseudoephedrine-DM 30-2-10 MG/5ML syrup; Take 10 mL by mouth 4 (Four) Times a Day As Needed for Congestion or Cough.  Dispense: 240 mL; Refill: 0    2. Body aches  -     POCT SARS-CoV-2 Antigen WESTON + Flu    3. Exposure to strep throat  -     POCT rapid strep A        Assessment & Plan  1.  Influenza B.  She reports symptoms starting at 5:00 AM, including vomiting, chills, aches, and fever. There is no ear pain or pressure. Her son tested positive for strep on Friday.  Her point-of-care testing for strep is negative.  Point-of-care testing for influenza and COVID-19 reveal positive influenza B.  I will treat with a course of Tamiflu and Bromfed for symptom management.  Follow-up if no improvement or  worsening symptoms.                  FOLLOW UP  Return if symptoms worsen or fail to improve.    Patient was given instructions and counseling regarding her condition or for health maintenance advice. Please see specific information pulled into the AVS if appropriate.       ISAAC Hickey  01/27/25  15:04 EST    CURRENT & DISCONTINUED MEDICATIONS  Current Outpatient Medications   Medication Instructions    BD Pen Needle Jocelyn 2nd Gen 32G X 4 MM misc     brompheniramine-pseudoephedrine-DM 30-2-10 MG/5ML syrup 10 mL, Oral, 4 Times Daily PRN    cetirizine (ZYRTEC) 10 mg, Oral, Daily    ibuprofen (ADVIL,MOTRIN) 800 mg, Oral, Every 8 Hours PRN    metoprolol succinate XL (TOPROL-XL) 25 mg, Daily    Norditropin FlexPro 15 MG/1.5ML solution pen-injector     oseltamivir (TAMIFLU) 75 mg, Oral, 2 Times Daily    Wegovy 0.25 MG/0.5ML solution auto-injector        There are no discontinued medications.     Patient or patient representative verbalized consent for the use of Ambient Listening during the visit with  ISAAC Hickey for chart documentation. 1/27/2025  14:56 EST

## 2025-01-28 ENCOUNTER — PATIENT MESSAGE (OUTPATIENT)
Dept: FAMILY MEDICINE CLINIC | Facility: CLINIC | Age: 45
End: 2025-01-28
Payer: OTHER GOVERNMENT

## 2025-02-04 NOTE — PROGRESS NOTES
"Chief Complaint: Urinary incontinence, unspecified type    Subjective         History of Present Illness  Laxmi Jc is a 44 y.o. female presents to [facility] UROLOGY to be seen for follow-up.    Patient was previously seen by me with last visit on 8/3/2024 for incontinence.  At that visit she also had advised that she was having hematuria but was still healing from her hysterectomy.  We discussed that we could check microscopy at this visit as well as further evaluate her incontinence once she was further away from her hysterectomy.    She reports a couple of weeks ago she did have an episode of incontinence when she sneezed. She is not having the same level of incontinence that she was before the hysterectomy.     She hasn't seen any blood in her urine.     Previous 8/3/2024:  Patient was previously seen by Dr. Janay Us with last visit on 5/2/2022 for OAB/hematuria/renal stones.  At that visit she was started on oxybutynin advised to have follow-up in 1 year.  She has not been seen since that time.     She reports she was previously on topamax which is a known cause of kidney stones- she has a history of stones and did not want to go through that again.      Frequency-was prior to her hysterectomy     Urgency-denies     Incontinence-with cough, fell today and voided- was dribbling without urgency prior to hysterectomy     Nocturia-denies     GH-still having bleeding from hysterectomy     History of stones-admits      surgeries-lithotripsy     Family history of  malignancy-denies     Cardiopulmonary-tachycardia since covid vaccine- toprol manages this     Anticoagulants-denies     Smoker-denie- quit 2024        Previous 5/2/2022:  The patient reports she is doing well. She reports she has not had any stones that she is aware of. She reports she has not had any x-rays. The patient reports after her last kidney stones in 2019, she did well with urination for a \"long while\". She " states she feels like she is not producing enough urine.  She reports she is not having trouble emptying her bladder.  The patient states she was told that her bladder was actually empty. She reports she feels like she has to urinate all day, but when she goes it is very little urine output. She reports when she wakes in the morning she has a strong urgency to urinate. The patient admits she has leakage if she laughs too hard. She reports she urinates a lot and then if she gets up, she still feels like she has to urinate. The patient reports she stopped drinking soda after she had kidney stones. She states she only drinks soda if she goes out to eat. She denies any other issues. The patient is agreeable to start oxybutynin and x-rays to rule out kidney stones.              Objective     Past Medical History:   Diagnosis Date    Adhesive capsulitis of left shoulder     Anxiety     HX OF NO CURRENT ISSUES AND NOT ON ANY MEDICATIONS    Low back pain     HAS HAD 1 EPISODE OF LOWER BACK PAIN HAD SPRAINED IT. NO CURRENT ISSUES    Migraines     Palpitations     DENIES CP/SOA. REPORTS STARTED POST COVID.  FOLLOWED BY DR GUADARRAMA YEARLY. ACTIVE    Pituitary adenoma     FOLLOWED BY DR ESTEBAN NEUROLOGY    Polymenorrhea     PT REPORTS HAS PERIODS ABOUT EVERY 2 WEEKS    Scleroderma     nonreactive    Tear of left rotator cuff        Past Surgical History:   Procedure Laterality Date    BREAST BIOPSY Left     FAT GRAFTING Left     REPAIR OF EXCISIONAL BX LEFT    HYSTERECTOMY  07/23/2024    KIDNEY STONE SURGERY      OVARIAN CYST REMOVAL      SHOULDER ARTHROSCOPY W/ ROTATOR CUFF REPAIR Left 08/21/2023    Procedure: LEFT SHOULDER MANIPULATION,  ARTHROSCOPY ROTATOR CUFF DEBRIDEMENT AND SUBACROMIAL DECOMPRESSION , labral debridement, subpectoral biceps tenodesis;  Surgeon: Jv Norton MD;  Location: [facility] OR [facility] [place];  Service: Orthopedics;  Laterality: Left;         Current Outpatient Medications:     BD Pen Needle Jocelyn 2nd Gen 32G  X 4 MM misc, , Disp: , Rfl:     cetirizine (zyrTEC) 10 MG tablet, Take 1 tablet by mouth Daily., Disp: 90 tablet, Rfl: 3    ibuprofen (ADVIL,MOTRIN) 800 MG tablet, TAKE 1 TABLET EVERY 8 HOURS AS NEEDED FOR MILD PAIN, Disp: 180 tablet, Rfl: 5    metoprolol succinate XL (TOPROL-XL) 25 MG 24 hr tablet, Take 1 tablet by mouth Daily. Indications: Supraventricular Tachycardia, palpitations, Disp: , Rfl:     Norditropin FlexPro 15 MG/1.5ML solution pen-injector, , Disp: , Rfl:     brompheniramine-pseudoephedrine-DM 30-2-10 MG/5ML syrup, Take 10 mL by mouth 4 (Four) Times a Day As Needed for Congestion or Cough., Disp: 240 mL, Rfl: 0    oseltamivir (Tamiflu) 75 MG capsule, Take 1 capsule by mouth 2 (Two) Times a Day., Disp: 10 capsule, Rfl: 0    Wegovy 0.25 MG/0.5ML solution auto-injector, , Disp: , Rfl:     Allergies   Allergen Reactions    Floxin Otic [Ofloxacin] Other (See Comments)     Swelling of ear canal     Penicillins Rash and Other (See Comments)    Sulfa Antibiotics Rash and Other (See Comments)    Sulfamethoxazole-Trimethoprim Rash        Family History   Problem Relation Age of Onset    Cancer Mother         Colon    Kidney disease Mother     Hepatitis Mother     Autoimmune disease Mother     Bleeding Disorder Mother     Hyperlipidemia Father     Hypertension Father     Diabetes Father     Stroke Father     Cancer Maternal Grandmother         Stomach    Malig Hyperthermia Neg Hx        Social History     Socioeconomic History    Marital status:    Tobacco Use    Smoking status: Former     Current packs/day: 0.00     Average packs/day: 0.5 packs/day for 6.2 years (3.1 ttl pk-yrs)     Types: Cigarettes     Start date:      Quit date: 3/1/2024     Years since quittin.9     Passive exposure: Current    Smokeless tobacco: Never    Tobacco comments:     Quit    Vaping Use    Vaping status: Never Used   Substance and Sexual Activity    Alcohol use: Never    Drug use: Never    Sexual activity:  "Defer       Vital Signs:   Resp 14   Ht 157.5 cm (62\")   Wt 68.9 kg (152 lb)   BMI 27.80 kg/m²      Physical Exam  Vitals reviewed.   Constitutional:       Appearance: Normal appearance.   Neurological:      General: No focal deficit present.      Mental Status: She is alert and oriented to person, place, and time.   Psychiatric:         Mood and Affect: Mood normal.         Behavior: Behavior normal.          Result Review :   The following data was reviewed by: ISAAC Le on 02/14/2025:  Results for orders placed or performed in visit on 02/14/25   Bladder Scan    Collection Time: 02/14/25  9:54 AM   Result Value Ref Range    Urine Volume 0         Bladder Scan interpretation 02/14/2025    Estimation of residual urine via BVI 3000 Verathon Bladder Scan  MA/nurse performing: Bridget JORDAN MA  Residual Urine: 0 ml  Indication: Urinary incontinence, unspecified type    Hematuria, unspecified type   Position: Supine  Examination: Incremental scanning of the suprapubic area using 2.0 MHz transducer using copious amounts of acoustic gel.   Findings: An anechoic area was demonstrated which represented the bladder, with measurement of residual urine as noted. I inspected this myself. In that the residual urine was stable or insignificant, refer to plan for treatment and plan necessary at this time.         Procedures        Assessment and Plan    Diagnoses and all orders for this visit:    1. Urinary incontinence, unspecified type (Primary)  -     Bladder Scan    2. Hematuria, unspecified type  -     Urinalysis With Microscopic - Urine, Clean Catch; Future  -     Urinalysis With Microscopic - Urine, Clean Catch    Given that her incontinence has improved since her hysterectomy, no further management needed.    We will send her urine today for microscopy. If it is negative, no further work up required. If positive, will set her up for hematuria workup per AUA guidelines.     We will let her know when " results are available.       Follow Up   No follow-ups on file.  Patient was given instructions and counseling regarding her condition or for health maintenance advice. Please see specific information pulled into the AVS if appropriate.         This document has been electronically signed by ISAAC Le  February 14, 2025 10:30 EST

## 2025-02-14 ENCOUNTER — OFFICE VISIT (OUTPATIENT)
Dept: UROLOGY | Age: 45
End: 2025-02-14
Payer: OTHER GOVERNMENT

## 2025-02-14 VITALS — RESPIRATION RATE: 14 BRPM | BODY MASS INDEX: 27.97 KG/M2 | HEIGHT: 62 IN | WEIGHT: 152 LBS

## 2025-02-14 DIAGNOSIS — R32 URINARY INCONTINENCE, UNSPECIFIED TYPE: Primary | ICD-10-CM

## 2025-02-14 DIAGNOSIS — R31.9 HEMATURIA, UNSPECIFIED TYPE: ICD-10-CM

## 2025-02-14 LAB
BACTERIA UR QL AUTO: ABNORMAL /HPF
BILIRUB UR QL STRIP: NEGATIVE
CLARITY UR: ABNORMAL
COLOR UR: ABNORMAL
GLUCOSE UR STRIP-MCNC: NEGATIVE MG/DL
HGB UR QL STRIP.AUTO: ABNORMAL
HYALINE CASTS UR QL AUTO: ABNORMAL /LPF
KETONES UR QL STRIP: ABNORMAL
LEUKOCYTE ESTERASE UR QL STRIP.AUTO: ABNORMAL
NITRITE UR QL STRIP: NEGATIVE
PH UR STRIP.AUTO: 5.5 [PH] (ref 5–8)
PROT UR QL STRIP: ABNORMAL
RBC # UR STRIP: ABNORMAL /HPF
REF LAB TEST METHOD: ABNORMAL
SP GR UR STRIP: 1.02 (ref 1–1.03)
SQUAMOUS #/AREA URNS HPF: ABNORMAL /HPF
URINE VOLUME: 0
UROBILINOGEN UR QL STRIP: ABNORMAL
WBC # UR STRIP: ABNORMAL /HPF

## 2025-02-14 PROCEDURE — 81001 URINALYSIS AUTO W/SCOPE: CPT | Performed by: NURSE PRACTITIONER

## 2025-02-17 ENCOUNTER — TELEPHONE (OUTPATIENT)
Dept: UROLOGY | Age: 45
End: 2025-02-17
Payer: OTHER GOVERNMENT

## 2025-02-17 DIAGNOSIS — R31.9 HEMATURIA, UNSPECIFIED TYPE: Primary | ICD-10-CM

## 2025-02-17 NOTE — TELEPHONE ENCOUNTER
Spoke to patient, let her know that her urine showed blood and Tamika had ordered a CT and a cysto; I let her know that the hub will call her to scheduled the CT and her cysto is scheduled with Dr Us, we will mail her the time/date. Patient verbalized understanding

## 2025-02-17 NOTE — PROGRESS NOTES
Please advise patient that her urine did show blood.  I have ordered a CT, they will call her to schedule this.  I have also ordered a cystoscopy.  Can we please set this up with Dr. Us?

## 2025-02-20 ENCOUNTER — TELEPHONE (OUTPATIENT)
Dept: ORTHOPEDIC SURGERY | Facility: CLINIC | Age: 45
End: 2025-02-20
Payer: OTHER GOVERNMENT

## 2025-02-20 NOTE — TELEPHONE ENCOUNTER
LT VM FOR PT TO CALL OFFICE, TO INFORM PT SHE'S  NEED A REFERRAL FROM HER PRIMARY CARE PHYSICIAN FOR HER RIGHT SHOULDER. OK FOR HUB TO RELAY MESSAGE.

## 2025-03-03 ENCOUNTER — PATIENT MESSAGE (OUTPATIENT)
Dept: FAMILY MEDICINE CLINIC | Facility: CLINIC | Age: 45
End: 2025-03-03
Payer: OTHER GOVERNMENT

## 2025-03-03 DIAGNOSIS — K52.9 POSTPRANDIAL DIARRHEA: Primary | ICD-10-CM

## 2025-04-09 ENCOUNTER — HOSPITAL ENCOUNTER (OUTPATIENT)
Dept: MRI IMAGING | Facility: HOSPITAL | Age: 45
Discharge: HOME | End: 2025-04-09
Payer: OTHER GOVERNMENT

## 2025-04-09 ENCOUNTER — HOSPITAL ENCOUNTER (OUTPATIENT)
Dept: CT IMAGING | Facility: HOSPITAL | Age: 45
Discharge: HOME | End: 2025-04-09
Payer: OTHER GOVERNMENT

## 2025-04-09 ENCOUNTER — OFFICE VISIT (OUTPATIENT)
Dept: CARDIOLOGY | Facility: CLINIC | Age: 45
End: 2025-04-09
Payer: OTHER GOVERNMENT

## 2025-04-09 VITALS
BODY MASS INDEX: 29.08 KG/M2 | HEIGHT: 62 IN | WEIGHT: 158 LBS | HEART RATE: 77 BPM | SYSTOLIC BLOOD PRESSURE: 119 MMHG | DIASTOLIC BLOOD PRESSURE: 99 MMHG

## 2025-04-09 DIAGNOSIS — R00.2 PALPITATIONS: Primary | ICD-10-CM

## 2025-04-09 DIAGNOSIS — H71.02 CHOLESTEATOMA OF ATTIC OF LEFT EAR: ICD-10-CM

## 2025-04-09 DIAGNOSIS — R03.0 ELEVATED BLOOD PRESSURE READING: ICD-10-CM

## 2025-04-09 DIAGNOSIS — H66.23 CHRONIC ATTICOANTRAL SUPPURATIVE OTITIS MEDIA OF BOTH EARS: ICD-10-CM

## 2025-04-09 DIAGNOSIS — R31.9 HEMATURIA, UNSPECIFIED TYPE: ICD-10-CM

## 2025-04-09 PROCEDURE — 74178 CT ABD&PLV WO CNTR FLWD CNTR: CPT

## 2025-04-09 PROCEDURE — 99213 OFFICE O/P EST LOW 20 MIN: CPT | Performed by: FAMILY MEDICINE

## 2025-04-09 PROCEDURE — 25510000001 IOPAMIDOL PER 1 ML: Performed by: NURSE PRACTITIONER

## 2025-04-09 PROCEDURE — A9577 INJ MULTIHANCE: HCPCS | Performed by: STUDENT IN AN ORGANIZED HEALTH CARE EDUCATION/TRAINING PROGRAM

## 2025-04-09 PROCEDURE — 25510000002 GADOBENATE DIMEGLUMINE 529 MG/ML SOLUTION: Performed by: STUDENT IN AN ORGANIZED HEALTH CARE EDUCATION/TRAINING PROGRAM

## 2025-04-09 PROCEDURE — 70553 MRI BRAIN STEM W/O & W/DYE: CPT

## 2025-04-09 RX ORDER — IOPAMIDOL 755 MG/ML
100 INJECTION, SOLUTION INTRAVASCULAR
Status: COMPLETED | OUTPATIENT
Start: 2025-04-09 | End: 2025-04-09

## 2025-04-09 RX ORDER — METOPROLOL SUCCINATE 25 MG/1
25 TABLET, EXTENDED RELEASE ORAL 2 TIMES DAILY
Qty: 180 TABLET | Refills: 1 | Status: SHIPPED | OUTPATIENT
Start: 2025-04-09

## 2025-04-09 RX ADMIN — IOPAMIDOL 100 ML: 755 INJECTION, SOLUTION INTRAVENOUS at 11:34

## 2025-04-09 RX ADMIN — GADOBENATE DIMEGLUMINE 15 ML: 529 INJECTION, SOLUTION INTRAVENOUS at 12:10

## 2025-04-09 NOTE — PROGRESS NOTES
Chief Complaint  Follow-up and Chest Pain    Subjective        History of Present Illness  Laxmi Jc presents to [facility] CARDIOLOGY   Ms. Jc is a 44-year-old female coming in today for routine cardiac follow-up for palpitations.  She is having some increased symptoms recently.  No complaints of chest pains, dizziness or shortness of breath.  Diastolic blood pressure is elevated in the office today.    Past History:     1) Pituitary adenoma; 2) Unspecified autoimmune disorder; 3) Depression; 4) Negative for diabetes mellitus, hypertension, and hyperlipidemia. 5) overactive bladder      Past Medical History:   Diagnosis Date    Adhesive capsulitis of left shoulder     Anxiety     Low back pain     Migraines     Palpitations     Pituitary adenoma     Polymenorrhea     Scleroderma     Tear of left rotator cuff        Allergies   Allergen Reactions    Floxin Otic [Ofloxacin] Other (See Comments)     Swelling of ear canal     Penicillins Rash and Other (See Comments)    Sulfa Antibiotics Rash and Other (See Comments)    Sulfamethoxazole-Trimethoprim Rash        Past Surgical History:   Procedure Laterality Date    BREAST BIOPSY Left     FAT GRAFTING Left     REPAIR OF EXCISIONAL BX LEFT    HYSTERECTOMY  07/23/2024    KIDNEY STONE SURGERY      OVARIAN CYST REMOVAL      SHOULDER ARTHROSCOPY W/ ROTATOR CUFF REPAIR Left 08/21/2023    Procedure: LEFT SHOULDER MANIPULATION,  ARTHROSCOPY ROTATOR CUFF DEBRIDEMENT AND SUBACROMIAL DECOMPRESSION , labral debridement, subpectoral biceps tenodesis;  Surgeon: Jv Norton MD;  [place] [facility] [place] [facility] [place]  Service: [place]opedics;  Laterality: Left;        Social History  She  reports that she quit smoking about 13 months ago. Her smoking use included cigarettes. She started smoking about 7 years ago. She has a 3.1 pack-year smoking history. She has been exposed to tobacco smoke. She has never used smokeless tobacco. She reports that she does not drink  "alcohol and does not use drugs.    Family History  Her family history includes Autoimmune disease in her mother; Bleeding Disorder in her mother; Cancer in her maternal grandmother and mother; Diabetes in her father; Hepatitis in her mother; Hyperlipidemia in her father; Hypertension in her father; Kidney disease in her mother; Stroke in her father.       Current Outpatient Medications on File Prior to Visit   Medication Sig    BD Pen Needle Jocelyn 2nd Gen 32G X 4 MM misc     brompheniramine-pseudoephedrine-DM 30-2-10 MG/5ML syrup Take 10 mL by mouth 4 (Four) Times a Day As Needed for Congestion or Cough.    cetirizine (zyrTEC) 10 MG tablet Take 1 tablet by mouth Daily.    ibuprofen (ADVIL,MOTRIN) 800 MG tablet TAKE 1 TABLET EVERY 8 HOURS AS NEEDED FOR MILD PAIN    Norditropin FlexPro 15 MG/1.5ML solution pen-injector     [DISCONTINUED] oseltamivir (Tamiflu) 75 MG capsule Take 1 capsule by mouth 2 (Two) Times a Day.    Wegovy 0.25 MG/0.5ML solution auto-injector  (Patient not taking: Reported on 1/27/2025)     No current facility-administered medications on file prior to visit.         Review of Systems   Constitutional:  Negative for fatigue.   Respiratory:  Negative for cough, chest tightness and shortness of breath.    Cardiovascular:  Positive for palpitations. Negative for chest pain and leg swelling.   Gastrointestinal:  Negative for nausea and vomiting.   Neurological:  Negative for dizziness and syncope.        Objective   Vitals:    04/09/25 0934   BP: 119/99   Pulse: 77   Weight: 71.7 kg (158 lb)   Height: 157.5 cm (62\")         Physical Exam  General : Alert, awake, no acute distress  Neck : Supple, no carotid bruit, no jugular venous distention  CVS : Regular rate and rhythm, no murmur, no rubs or gallops  Lungs: Clear to auscultation bilaterally, no crackles or rhonchi  Abdomen: Soft, nontender, bowel sounds active  Extremities: Warm, well-perfused, no pedal edema      Result Review     The following data " "was reviewed by ISAAC Jean  proBNP   Date Value Ref Range Status   08/03/2024 <36.0 0.0 - 450.0 pg/mL Final     CMP          8/3/2024    14:17   CMP   Glucose 118    BUN 12    Creatinine 0.83    EGFR 89.8    Sodium 134    Potassium 4.0    Chloride 96    Calcium 9.4    Total Protein 7.6    Albumin 4.1    Globulin 3.5    Total Bilirubin 0.2    Alkaline Phosphatase 73    AST (SGOT) 11    ALT (SGPT) 11    Albumin/Globulin Ratio 1.2    BUN/Creatinine Ratio 14.5    Anion Gap 14.2      CBC w/diff          5/18/2024    02:51 5/18/2024    04:46 8/3/2024    14:17   CBC w/Diff   WBC   13.84    RBC   3.87    Hemoglobin 12.5  12.4  11.2    Hematocrit 37.5  37.5  34.8    MCV   89.9    MCH   28.9    MCHC   32.2    RDW   13.2    Platelets   540    Neutrophil Rel %   67.4    Immature Granulocyte Rel %   0.7    Lymphocyte Rel %   18.6    Monocyte Rel %   7.7    Eosinophil Rel %   4.9    Basophil Rel %   0.7       Lab Results   Component Value Date    TSH 1.220 01/17/2024      Lab Results   Component Value Date    FREET4 1.00 01/17/2024      D-Dimer, Quantitative   Date Value Ref Range Status   08/03/2024 1.40 (H) 0.00 - 0.50 MCGFEU/mL Final     Magnesium   Date Value Ref Range Status   08/03/2024 2.0 1.6 - 2.6 mg/dL Final      No results found for: \"DIGOXIN\"   Lab Results   Component Value Date    TROPONINT <6 08/03/2024               Echocardiogram from 12/9/2020    CONCLUSION:  1.  Normal left ventricular systolic function with a calculated LV ejection fraction of 66%.   2.  Normal diastolic function of the left ventricle.   3.  No hemodynamically significant valvular abnormalities.       Holter Monitor  from 12/9/2020     CONCLUSION:  Forty-eight Holter monitor study showing sinus rhythm and a 5-beat run of supraventricular ectopy. There was no correlation of patient symptoms to any arrhythmias.                Assessment and Plan   Diagnoses and all orders for this visit:    1. Palpitations (Primary)  Assessment & " Plan:  Increased symptoms of palpitations recently, previously better controlled with metoprolol.  Recommend increasing dose of metoprolol to twice daily.      2. Elevated blood pressure reading  Diastolic blood pressure is elevated in office, and again on repeat.  Increasing dose of metoprolol for better symptom control palpitations, recommend tracking BP at home for the next 1 to 2 weeks, and can consider treatment with antihypertensives if persistently elevated.      -     metoprolol succinate XL (TOPROL-XL) 25 MG 24 hr tablet; Take 1 tablet by mouth 2 (Two) Times a Day. Indications: Supraventricular Tachycardia, palpitations  Dispense: 180 tablet; Refill: 1                Follow Up   Return in about 6 months (around 10/9/2025) for with Dr. Spann.    Patient was given instructions and counseling regarding her condition or for health maintenance advice. Please see specific information pulled into the AVS if appropriate.     Signed,  Delphine Mejia, APRN  04/09/2025     Dictated Utilizing Dragon Dictation: Please note that portions of this note were completed with a voice recognition program.  Part of this note may be an electronic transcription/translation of spoken language to printed text using the Dragon Dictation System.

## 2025-04-10 NOTE — ASSESSMENT & PLAN NOTE
Increased symptoms of palpitations recently, previously better controlled with metoprolol.  Recommend increasing dose of metoprolol to twice daily.

## 2025-04-21 ENCOUNTER — PROCEDURE VISIT (OUTPATIENT)
Dept: UROLOGY | Age: 45
End: 2025-04-21
Payer: OTHER GOVERNMENT

## 2025-04-21 VITALS — BODY MASS INDEX: 29.08 KG/M2 | HEIGHT: 62 IN | WEIGHT: 158 LBS

## 2025-04-21 DIAGNOSIS — R32 URINARY INCONTINENCE, UNSPECIFIED TYPE: ICD-10-CM

## 2025-04-21 DIAGNOSIS — R31.9 HEMATURIA, UNSPECIFIED TYPE: Primary | ICD-10-CM

## 2025-04-21 DIAGNOSIS — Z87.442 HISTORY OF KIDNEY STONES: ICD-10-CM

## 2025-04-21 LAB
BILIRUB BLD-MCNC: NEGATIVE MG/DL
CLARITY, POC: CLEAR
COLOR UR: YELLOW
EXPIRATION DATE: 326
GLUCOSE UR STRIP-MCNC: NEGATIVE MG/DL
KETONES UR QL: NEGATIVE
LEUKOCYTE EST, POC: NEGATIVE
Lab: ABNORMAL
NITRITE UR-MCNC: NEGATIVE MG/ML
PH UR: 5.5 [PH] (ref 5–8)
PROT UR STRIP-MCNC: NEGATIVE MG/DL
RBC # UR STRIP: ABNORMAL /UL
SP GR UR: 1.03 (ref 1–1.03)
UROBILINOGEN UR QL: ABNORMAL

## 2025-04-21 PROCEDURE — 99213 OFFICE O/P EST LOW 20 MIN: CPT | Performed by: UROLOGY

## 2025-04-21 PROCEDURE — 81003 URINALYSIS AUTO W/O SCOPE: CPT | Performed by: UROLOGY

## 2025-04-21 PROCEDURE — 52000 CYSTOURETHROSCOPY: CPT | Performed by: UROLOGY

## 2025-04-21 RX ORDER — OXYBUTYNIN CHLORIDE 5 MG/1
5 TABLET, EXTENDED RELEASE ORAL DAILY
Qty: 30 TABLET | Refills: 11 | Status: SHIPPED | OUTPATIENT
Start: 2025-04-21

## 2025-04-21 NOTE — PROGRESS NOTES
Cystoscopy    Date/Time: 4/21/2025 1:15 PM    Performed by: Janay Us MD  Authorized by: Janay Us MD  Preparation: Patient was prepped and draped in the usual sterile fashion.  Local anesthesia used: no    Anesthesia:  Local anesthesia used: no    Sedation:  Patient sedated: no    Patient tolerance: patient tolerated the procedure well with no immediate complications        Cytoscopy Procedure:     Procedure: Flexible cytoscope was passed per urethra into the bladder without difficulty after proper consent. The bladder was inspected in a systematic meridian fashion. There were no tumors, lesions, stones, or other abnormalities noted within the bladder. Of note, there was no increased vascularity as well. Both ureteral orifices were identified and were normal in appearance. The flexible cytoscope was removed. The patient tolerated the procedure well.           She is a history of kidney stones and recent CT showed just dusted/stones in her kidneys.  Will do a KUB in 1 year.  She also has a history of overactive bladder and is done well on oxybutynin ER 5 mg once a day.  I refilled that at her request today.  Again follow-up in 1 year.  No other findings in her workup for hematuria other than the very small stones.

## 2025-06-03 ENCOUNTER — OFFICE VISIT (OUTPATIENT)
Dept: ORTHOPEDIC SURGERY | Facility: CLINIC | Age: 45
End: 2025-06-03
Payer: OTHER GOVERNMENT

## 2025-06-03 VITALS — BODY MASS INDEX: 29.08 KG/M2 | HEIGHT: 62 IN | WEIGHT: 158 LBS | HEART RATE: 68 BPM | OXYGEN SATURATION: 96 %

## 2025-06-03 DIAGNOSIS — M25.811 IMPINGEMENT OF RIGHT SHOULDER: Primary | ICD-10-CM

## 2025-06-03 DIAGNOSIS — S43.431D SUPERIOR GLENOID LABRUM LESION OF RIGHT SHOULDER, SUBSEQUENT ENCOUNTER: ICD-10-CM

## 2025-06-03 RX ORDER — LIDOCAINE HYDROCHLORIDE 10 MG/ML
5 INJECTION, SOLUTION EPIDURAL; INFILTRATION; INTRACAUDAL; PERINEURAL
Status: COMPLETED | OUTPATIENT
Start: 2025-06-03 | End: 2025-06-03

## 2025-06-03 RX ORDER — TRIAMCINOLONE ACETONIDE 40 MG/ML
40 INJECTION, SUSPENSION INTRA-ARTICULAR; INTRAMUSCULAR
Status: COMPLETED | OUTPATIENT
Start: 2025-06-03 | End: 2025-06-03

## 2025-06-03 RX ADMIN — TRIAMCINOLONE ACETONIDE 40 MG: 40 INJECTION, SUSPENSION INTRA-ARTICULAR; INTRAMUSCULAR at 15:23

## 2025-06-03 RX ADMIN — LIDOCAINE HYDROCHLORIDE 5 ML: 10 INJECTION, SOLUTION EPIDURAL; INFILTRATION; INTRACAUDAL; PERINEURAL at 15:23

## 2025-06-03 NOTE — PROGRESS NOTES
Chief Complaint  Follow-up of the Right Shoulder    Subjective          History of Present Illness      Laxmi Jc is a 44 y.o. female     History of Present Illness  Patient presents for follow-up evaluation of right shoulder pain and right shoulder SLAP tear/impingement.  She has not been seen since November for the shoulder.  She has received injections when needed.  She states her shoulder has been doing well until recently it started hurting more.  She states that it wakes her up at night she cannot get comfortable due to the pain.  She states the pain is deep to the center of her shoulder she does workout but tries to favor the right shoulder because she is worried it will begin hurting for her.  She does receive benefit from injections and she is here to request a right shoulder injection today.        Allergies   Allergen Reactions    Floxin Otic [Ofloxacin] Other (See Comments)     Swelling of ear canal     Penicillins Rash and Other (See Comments)    Sulfa Antibiotics Rash and Other (See Comments)    Sulfamethoxazole-Trimethoprim Rash        Social History     Socioeconomic History    Marital status:    Tobacco Use    Smoking status: Former     Current packs/day: 0.00     Average packs/day: 0.5 packs/day for 10.0 years (5.0 ttl pk-yrs)     Types: Cigarettes     Start date:      Quit date: 3/1/2024     Years since quittin.2     Passive exposure: Current    Smokeless tobacco: Never    Tobacco comments:     Quit    Vaping Use    Vaping status: Never Used   Substance and Sexual Activity    Alcohol use: Never    Drug use: Never    Sexual activity: Yes     Partners: Male     Birth control/protection: Hysterectomy        REVIEW OF SYSTEMS    Constitutional: Awake alert and oriented x3, no acute distress, denies fevers, chills, weight loss  Respiratory: No respiratory distress  Vascular: Brisk cap refill, Intact distal pulses, No cyanosis, compartments soft with no signs or symptoms of  "compartment syndrome or DVT.   Cardiovascular: Denies chest pain, shortness of breath  Skin: Denies rashes, acute skin changes  Neurologic: Denies headache, loss of consciousness  MSK: Right shoulder pain      Objective   Vital Signs:   Pulse 68   Ht 157.5 cm (62.01\")   Wt 71.7 kg (158 lb)   SpO2 96%   BMI 28.89 kg/m²     Body mass index is 28.89 kg/m².         Physical Exam  Right shoulder: Mild tenderness palpation anterior lateral shoulder active forward elevation 175 active abduction 140 external rotation with abduction 85 internal rotation to T10, 5 out of 5 supraspinatus 5 out of 5 infraspinatus and subscapularis, neurovascularly intact         Large Joint: R subacromial bursa  Date/Time: 6/3/2025 3:23 PM  Consent given by: patient  Site marked: site marked  Timeout: Immediately prior to procedure a time out was called to verify the correct patient, procedure, equipment, support staff and site/side marked as required   Supporting Documentation  Indications: pain   Procedure Details  Location: shoulder - R subacromial bursa  Preparation: Patient was prepped and draped in the usual sterile fashion  Needle gauge: 21g.  Medications administered: 5 mL lidocaine PF 1% 1 %; 40 mg triamcinolone acetonide 40 MG/ML  Patient tolerance: patient tolerated the procedure well with no immediate complications    This injection documentation was Scribed for LORAINE Mcintosh by Janay Ho MA.  06/03/25   15:23 EDT    Imaging Results (Most Recent)       None                     Assessment and Plan    Diagnoses and all orders for this visit:    1. Impingement of right shoulder (Primary)    2. Superior glenoid labrum lesion of right shoulder, subsequent encounter        Assessment & Plan  Discussed diagnosis and treatment options with the patient, we did discuss ordering an updated MRI for the shoulder she would like to hold off on this for now and she elected have right shoulder steroid injection.  A sterile " prep of the injection site was performed with chloraprep. Cryospray was used for local anesthesia. The site was injected. The patient tolerated the procedure well without complications. Post injection pain was discussed.    The risks, benefits, complications, treatment options/alternatives, and expected outcomes/goals were discussed with the patient.   Follow-up 3 months      Call or return if worsening symptoms.    Follow Up   No follow-ups on file.  Patient was given instructions and counseling regarding her condition or for health maintenance advice. Please see specific information pulled into the AVS if appropriate.       Contains text text generated by ALAN Copilot  EMR Dragon/Transcription disclaimer:  Part of this note may be an electronic transcription/translation of spoken language to printed text using the Dragon Dictation System

## 2025-06-17 ENCOUNTER — LAB (OUTPATIENT)
Dept: LAB | Facility: HOSPITAL | Age: 45
End: 2025-06-17
Payer: OTHER GOVERNMENT

## 2025-06-17 ENCOUNTER — TRANSCRIBE ORDERS (OUTPATIENT)
Dept: ADMINISTRATIVE | Facility: HOSPITAL | Age: 45
End: 2025-06-17
Payer: OTHER GOVERNMENT

## 2025-06-17 ENCOUNTER — OFFICE VISIT (OUTPATIENT)
Dept: GASTROENTEROLOGY | Facility: CLINIC | Age: 45
End: 2025-06-17
Payer: OTHER GOVERNMENT

## 2025-06-17 VITALS
DIASTOLIC BLOOD PRESSURE: 71 MMHG | HEART RATE: 69 BPM | BODY MASS INDEX: 28.5 KG/M2 | HEIGHT: 62 IN | SYSTOLIC BLOOD PRESSURE: 121 MMHG | WEIGHT: 154.9 LBS

## 2025-06-17 DIAGNOSIS — R14.0 BLOATING: ICD-10-CM

## 2025-06-17 DIAGNOSIS — R19.7 DIARRHEA, UNSPECIFIED TYPE: ICD-10-CM

## 2025-06-17 DIAGNOSIS — R19.7 DIARRHEA, UNSPECIFIED TYPE: Primary | ICD-10-CM

## 2025-06-17 DIAGNOSIS — E23.6 RATHKE'S POUCH CYST: Primary | ICD-10-CM

## 2025-06-17 DIAGNOSIS — K63.5 HYPERPLASTIC POLYP OF INTESTINE: ICD-10-CM

## 2025-06-17 DIAGNOSIS — R10.84 GENERALIZED ABDOMINAL PAIN: ICD-10-CM

## 2025-06-17 DIAGNOSIS — E23.6 RATHKE'S POUCH CYST: ICD-10-CM

## 2025-06-17 LAB
BASOPHILS # BLD AUTO: 0.05 10*3/MM3 (ref 0–0.2)
BASOPHILS NFR BLD AUTO: 0.8 % (ref 0–1.5)
CORTIS AM PEAK SERPL-MCNC: 4.23 MCG/DL (ref 6.02–18.4)
CRP SERPL-MCNC: 0.35 MG/DL (ref 0–0.5)
DEPRECATED RDW RBC AUTO: 45.3 FL (ref 37–54)
EOSINOPHIL # BLD AUTO: 0.35 10*3/MM3 (ref 0–0.4)
EOSINOPHIL NFR BLD AUTO: 5.4 % (ref 0.3–6.2)
ERYTHROCYTE [DISTWIDTH] IN BLOOD BY AUTOMATED COUNT: 13.4 % (ref 12.3–15.4)
ERYTHROCYTE [SEDIMENTATION RATE] IN BLOOD: 10 MM/HR (ref 0–20)
HCT VFR BLD AUTO: 39.2 % (ref 34–46.6)
HGB BLD-MCNC: 13.2 G/DL (ref 12–15.9)
IMM GRANULOCYTES # BLD AUTO: 0.01 10*3/MM3 (ref 0–0.05)
IMM GRANULOCYTES NFR BLD AUTO: 0.2 % (ref 0–0.5)
LYMPHOCYTES # BLD AUTO: 1.73 10*3/MM3 (ref 0.7–3.1)
LYMPHOCYTES NFR BLD AUTO: 26.5 % (ref 19.6–45.3)
MCH RBC QN AUTO: 30.6 PG (ref 26.6–33)
MCHC RBC AUTO-ENTMCNC: 33.7 G/DL (ref 31.5–35.7)
MCV RBC AUTO: 91 FL (ref 79–97)
MONOCYTES # BLD AUTO: 0.6 10*3/MM3 (ref 0.1–0.9)
MONOCYTES NFR BLD AUTO: 9.2 % (ref 5–12)
NEUTROPHILS NFR BLD AUTO: 3.79 10*3/MM3 (ref 1.7–7)
NEUTROPHILS NFR BLD AUTO: 57.9 % (ref 42.7–76)
NRBC BLD AUTO-RTO: 0 /100 WBC (ref 0–0.2)
PLATELET # BLD AUTO: 353 10*3/MM3 (ref 140–450)
PMV BLD AUTO: 9.8 FL (ref 6–12)
RBC # BLD AUTO: 4.31 10*6/MM3 (ref 3.77–5.28)
WBC NRBC COR # BLD AUTO: 6.53 10*3/MM3 (ref 3.4–10.8)

## 2025-06-17 PROCEDURE — 85652 RBC SED RATE AUTOMATED: CPT

## 2025-06-17 PROCEDURE — 86003 ALLG SPEC IGE CRUDE XTRC EA: CPT

## 2025-06-17 PROCEDURE — 99214 OFFICE O/P EST MOD 30 MIN: CPT

## 2025-06-17 PROCEDURE — 36415 COLL VENOUS BLD VENIPUNCTURE: CPT

## 2025-06-17 PROCEDURE — 82533 TOTAL CORTISOL: CPT

## 2025-06-17 PROCEDURE — 86140 C-REACTIVE PROTEIN: CPT

## 2025-06-17 PROCEDURE — 84305 ASSAY OF SOMATOMEDIN: CPT

## 2025-06-17 PROCEDURE — 86008 ALLG SPEC IGE RECOMB EA: CPT

## 2025-06-17 PROCEDURE — 82024 ASSAY OF ACTH: CPT

## 2025-06-17 PROCEDURE — 82785 ASSAY OF IGE: CPT

## 2025-06-17 PROCEDURE — 85025 COMPLETE CBC W/AUTO DIFF WBC: CPT

## 2025-06-17 PROCEDURE — 83003 ASSAY GROWTH HORMONE (HGH): CPT

## 2025-06-17 NOTE — PROGRESS NOTES
Chief Complaint  Diarrhea, Bloated, and Abdominal Pain    Laxmi Jc is a 44 y.o. female who presents to [facility] on referral from HUMBERTO Hickey* for a gastroenterology evaluation of abdominal pain, diarrhea, and bloating.        History of Present Illness  New patient presents to the office for abdominal pain, diarrhea, and bloating.  Patient has been struggling with symptoms for several years, symptoms first started while living in [place].  Patient has had workup to include HIDA scan, ultrasound, EGD, colonoscopy, and stool studies which have all been unremarkable.  She has about 6-7 loose stools a day.  Denies melena and hematochezia.  She will utilize Imodium which can cause her to go several days without a bowel movement and then diarrhea will resume.  Denies unintentional weight loss.    CT abdomen/pelvis w/ and w/o contrast 4/9/25 -nonobstructive kidney stones, contracted urinary bladder, left ovarian cyst 2.9 cm    EGD/Colonoscopy 3/15/2024 by Dr. Dill - Normal esophagus, stomach, and duodenum. Small bowel biopsies normal. Tubular adenoma polyp in cecum and tubular adenoma polyp in transverse colon. Random colon biopsies normal. Repeat 7 years.     HIDA 1/20/25 - EF 75%  US 12/06/24- normal    Fecal calprotectin 1/18/2024 - normal    Past Medical History:   Diagnosis Date    Adhesive capsulitis of left shoulder     Allergic     Anxiety     HX OF NO CURRENT ISSUES AND NOT ON ANY MEDICATIONS    Low back pain     HAS HAD 1 EPISODE OF LOWER BACK PAIN HAD SPRAINED IT. NO CURRENT ISSUES    Migraines     Palpitations     DENIES CP/SOA. REPORTS STARTED POST COVID.  FOLLOWED BY DR GUADARRAMA YEARLY. ACTIVE    Pituitary adenoma     FOLLOWED BY DR ESTEBAN NEUROLOGY    Polymenorrhea     PT REPORTS HAS PERIODS ABOUT EVERY 2 WEEKS    Scleroderma     nonreactive    Tear of left rotator cuff        Past Surgical History:   Procedure Laterality Date    BREAST  BIOPSY Left     COLONOSCOPY  Jan 2024    FAT GRAFTING Left     REPAIR OF EXCISIONAL BX LEFT    HYSTERECTOMY  07/23/2024    KIDNEY STONE SURGERY      OVARIAN CYST REMOVAL      SHOULDER ARTHROSCOPY W/ ROTATOR CUFF REPAIR Left 08/21/2023    Procedure: LEFT SHOULDER MANIPULATION,  ARTHROSCOPY ROTATOR CUFF DEBRIDEMENT AND SUBACROMIAL DECOMPRESSION , labral debridement, subpectoral biceps tenodesis;  Surgeon: Jv Norton MD;  Location: [facility] OR [facility];  Service: Orthopedics;  Laterality: Left;         Current Outpatient Medications:     BD Pen Needle Jocelyn 2nd Gen 32G X 4 MM misc, , Disp: , Rfl:     brompheniramine-pseudoephedrine-DM 30-2-10 MG/5ML syrup, Take 10 mL by mouth 4 (Four) Times a Day As Needed for Congestion or Cough., Disp: 240 mL, Rfl: 0    cetirizine (zyrTEC) 10 MG tablet, Take 1 tablet by mouth Daily., Disp: 90 tablet, Rfl: 3    ibuprofen (ADVIL,MOTRIN) 800 MG tablet, TAKE 1 TABLET EVERY 8 HOURS AS NEEDED FOR MILD PAIN, Disp: 180 tablet, Rfl: 5    metoprolol succinate XL (TOPROL-XL) 25 MG 24 hr tablet, Take 1 tablet by mouth 2 (Two) Times a Day. Indications: Supraventricular Tachycardia, palpitations, Disp: 180 tablet, Rfl: 1    Norditropin FlexPro 15 MG/1.5ML solution pen-injector, , Disp: , Rfl:     oxybutynin XL (DITROPAN-XL) 5 MG 24 hr tablet, Take 1 tablet by mouth Daily., Disp: 30 tablet, Rfl: 11    Wegovy 0.25 MG/0.5ML solution auto-injector, , Disp: , Rfl:      Allergies   Allergen Reactions    Floxin Otic [Ofloxacin] Other (See Comments)     Swelling of ear canal     Penicillins Rash and Other (See Comments)    Sulfa Antibiotics Rash and Other (See Comments)    Sulfamethoxazole-Trimethoprim Rash       Family History   Problem Relation Age of Onset    Cancer Mother         Colon & smoldering multiple myeloma    Kidney disease Mother     Hepatitis Mother     Autoimmune disease Mother     Bleeding Disorder Mother     Clotting disorder Mother     Rheumatologic disease Mother         Autoimmune  "Hepatitus    Collagen disease Mother         Autoimmune Hepatitus    Hyperlipidemia Father     Hypertension Father     Diabetes Father     Stroke Father     Kidney disease Father     Rheumatologic disease Father         Rheumatoid Arthritis    Clotting disorder Sister         Von sara disease    Clotting disorder Sister         Von sara    Stomach cancer Maternal Grandmother     Cancer Maternal Grandmother         Gall Bladder Cancer    Malig Hyperthermia Neg Hx         Social History     Social History Narrative    Not on file       Immunization:  Immunization History   Administered Date(s) Administered    COVID-19 (MODERNA) 1st,2nd,3rd Dose Monovalent 03/24/2021, 04/21/2021    Fluzone (or Fluarix & Flulaval for VFC) >6mos 11/05/2019, 01/17/2024    Hepatitis A 07/20/2018    PPD Test 08/28/2017    Pneumococcal Conjugate 20-Valent (PCV20) 01/17/2024    Tdap 07/20/2018        Objective     Vital Signs:   /71 (BP Location: Left arm, Patient Position: Sitting, Cuff Size: Adult)   Pulse 69   Ht 157.5 cm (62\")   Wt 70.3 kg (154 lb 14.4 oz)   BMI 28.33 kg/m²       Physical Exam  Constitutional:       Appearance: Normal appearance. She is normal weight.   HENT:      Head: Normocephalic and atraumatic.      Nose: Nose normal.   Pulmonary:      Effort: Pulmonary effort is normal.   Skin:     General: Skin is warm and dry.   Neurological:      Mental Status: She is alert and oriented to person, place, and time. Mental status is at baseline.   Psychiatric:         Mood and Affect: Mood normal.         Behavior: Behavior normal.         Thought Content: Thought content normal.         Judgment: Judgment normal.         Result Review :     CBC w/diff          8/3/2024    14:17   CBC w/Diff   WBC 13.84    RBC 3.87    Hemoglobin 11.2    Hematocrit 34.8    MCV 89.9    MCH 28.9    MCHC 32.2    RDW 13.2    Platelets 540    Neutrophil Rel % 67.4    Immature Granulocyte Rel % 0.7    Lymphocyte Rel % 18.6  "   Monocyte Rel % 7.7    Eosinophil Rel % 4.9    Basophil Rel % 0.7      CMP          8/3/2024    14:17   CMP   Glucose 118    BUN 12    Creatinine 0.83    EGFR 89.8    Sodium 134    Potassium 4.0    Chloride 96    Calcium 9.4    Total Protein 7.6    Albumin 4.1    Globulin 3.5    Total Bilirubin 0.2    Alkaline Phosphatase 73    AST (SGOT) 11    ALT (SGPT) 11    Albumin/Globulin Ratio 1.2    BUN/Creatinine Ratio 14.5    Anion Gap 14.2              Assessment and Plan    Diagnoses and all orders for this visit:    1. Diarrhea, unspecified type (Primary)  -     Alpha-Gal IgE Panel; Future  -     CBC & Differential; Future  -     Sedimentation Rate; Future  -     C-reactive Protein; Future    2. Generalized abdominal pain    3. Bloating    44-year-old new patient presents to the office for abdominal pain, diarrhea, and bloating.  Patient has been struggling with symptoms for several years, symptoms first started while living in [place].  Patient has had workup to include HIDA scan, ultrasound, EGD, colonoscopy, and stool studies which have all been unremarkable.  She has about 6-7 loose stools a day.  Denies melena and hematochezia.  She will utilize Imodium which can cause her to go several days without a bowel movement and then diarrhea will resume.  Patient completed with alpha gal testing to further rule out allergy for cause of symptoms.  If negative may consider trial of Xifaxan.  If inflammatory markers are elevated as well as WBC may consider CT enterography.  Patient will follow-up in about 3 months.  Patient is agreeable plan call office any questions or concerns.    Follow Up   Return in about 3 months (around 9/17/2025).  Patient was given instructions and counseling regarding her condition or for health maintenance advice. Please see specific information pulled into the AVS if appropriate.

## 2025-06-18 LAB — ACTH PLAS-MCNC: 7.6 PG/ML (ref 7.2–63.3)

## 2025-06-19 ENCOUNTER — PATIENT ROUNDING (BHMG ONLY) (OUTPATIENT)
Dept: GASTROENTEROLOGY | Facility: CLINIC | Age: 45
End: 2025-06-19
Payer: OTHER GOVERNMENT

## 2025-06-19 LAB
GH SERPL-MCNC: 0.2 NG/ML (ref 0–10)
IGF-I SERPL-MCNC: 222 NG/ML (ref 74–239)

## 2025-06-19 NOTE — PROGRESS NOTES
6/19/2025      Hello, may I speak with Laxmi Jc     My name is Jolene. I am calling from [facility] [place]. I show that you had a recent visit with ISAAC Christina.    Before we get started may I verify your date of birth? 1980    I am calling to officially welcome you to our practice and ask about your recent visit. Is this a good time to talk?    LVM ok per JOSEFA    Tell me about your visit with us. What things went well?    We strive to ensure that we protect your safety and privacy. Is there anything we could have done to improve this during your visit?        We're always looking for ways to make our patients' experiences even better. Do you have recommendations on ways we may improve?    Overall were you satisfied with your first visit to our practice?    I appreciate you taking the time to speak with me today. Is there anything else I can do for you?    I am glad to hear that you had a very good visit and I appreciate you taking the time to provide feedback on this call. We would greatly appreciate you filling out a survey if you receive one in the mail, email or text. This is a great opportunity to provide any additional feedback that you may think of after this call as well.       Thank you, and have a great day.

## 2025-06-21 LAB
ALPHA-GAL IGE QN: <0.1 KU/L
BEEF IGE QN: <0.1 KU/L
IGE SERPL-ACNC: 82 IU/ML (ref 6–495)
LAMB IGE QN: <0.1 KU/L
PORK IGE QN: <0.1 KU/L
SERVICE CMNT-IMP: NORMAL

## 2025-06-27 ENCOUNTER — SPECIALTY PHARMACY (OUTPATIENT)
Dept: OTHER | Facility: HOSPITAL | Age: 45
End: 2025-06-27
Payer: OTHER GOVERNMENT

## 2025-06-27 PROBLEM — R19.7 DIARRHEA: Status: ACTIVE | Noted: 2025-06-27

## 2025-06-30 ENCOUNTER — TELEPHONE (OUTPATIENT)
Dept: GASTROENTEROLOGY | Facility: CLINIC | Age: 45
End: 2025-06-30
Payer: OTHER GOVERNMENT

## 2025-06-30 NOTE — PROGRESS NOTES
Xifaxan PA approved thru 6/30/26. Rx has already been sent to [facility].     Verenice White, PharmD  Specialty Pharmacist  Pulmonology/Gastroenterology    [phone]

## 2025-07-08 ENCOUNTER — OFFICE VISIT (OUTPATIENT)
Dept: FAMILY MEDICINE CLINIC | Facility: CLINIC | Age: 45
End: 2025-07-08
Payer: OTHER GOVERNMENT

## 2025-07-08 VITALS
OXYGEN SATURATION: 97 % | HEIGHT: 62 IN | TEMPERATURE: 97.7 F | BODY MASS INDEX: 28.34 KG/M2 | SYSTOLIC BLOOD PRESSURE: 120 MMHG | WEIGHT: 154 LBS | HEART RATE: 93 BPM | DIASTOLIC BLOOD PRESSURE: 72 MMHG

## 2025-07-08 DIAGNOSIS — M26.52 LIMITED JAW RANGE OF MOTION: ICD-10-CM

## 2025-07-08 DIAGNOSIS — H65.02 NON-RECURRENT ACUTE SEROUS OTITIS MEDIA OF LEFT EAR: Primary | ICD-10-CM

## 2025-07-08 DIAGNOSIS — M26.622 ARTHRALGIA OF LEFT TEMPOROMANDIBULAR JOINT: ICD-10-CM

## 2025-07-08 PROCEDURE — 99213 OFFICE O/P EST LOW 20 MIN: CPT | Performed by: NURSE PRACTITIONER

## 2025-07-08 RX ORDER — AZITHROMYCIN 250 MG/1
TABLET, FILM COATED ORAL
Qty: 6 TABLET | Refills: 0 | Status: SHIPPED | OUTPATIENT
Start: 2025-07-08

## 2025-07-08 RX ORDER — CYCLOBENZAPRINE HCL 10 MG
10 TABLET ORAL NIGHTLY PRN
Qty: 3 TABLET | Refills: 2 | Status: SHIPPED | OUTPATIENT
Start: 2025-07-08

## 2025-07-08 RX ORDER — METHYLPREDNISOLONE 4 MG/1
TABLET ORAL
Qty: 21 EACH | Refills: 0 | Status: SHIPPED | OUTPATIENT
Start: 2025-07-08

## 2025-07-08 NOTE — LETTER
July 8, 2025     Patient: Laxmi Jc   YOB: 1980   Date of Visit: 7/8/2025       To Whom It May Concern:    It is my medical opinion that Laxmi Jc may return to work on 7/14/25.           Sincerely,        ISACA Amin

## 2025-07-22 ENCOUNTER — HOSPITAL ENCOUNTER (EMERGENCY)
Facility: HOSPITAL | Age: 45
Discharge: HOME | End: 2025-07-22
Attending: EMERGENCY MEDICINE | Admitting: EMERGENCY MEDICINE
Payer: OTHER GOVERNMENT

## 2025-07-22 ENCOUNTER — APPOINTMENT (OUTPATIENT)
Dept: CT IMAGING | Facility: HOSPITAL | Age: 45
End: 2025-07-22
Payer: OTHER GOVERNMENT

## 2025-07-22 ENCOUNTER — APPOINTMENT (OUTPATIENT)
Dept: GENERAL RADIOLOGY | Facility: HOSPITAL | Age: 45
End: 2025-07-22
Payer: OTHER GOVERNMENT

## 2025-07-22 VITALS
TEMPERATURE: 98.1 F | WEIGHT: 152.34 LBS | DIASTOLIC BLOOD PRESSURE: 79 MMHG | RESPIRATION RATE: 21 BRPM | SYSTOLIC BLOOD PRESSURE: 110 MMHG | BODY MASS INDEX: 28.76 KG/M2 | HEART RATE: 78 BPM | HEIGHT: 61 IN | OXYGEN SATURATION: 100 %

## 2025-07-22 DIAGNOSIS — R51.9 ACUTE INTRACTABLE HEADACHE, UNSPECIFIED HEADACHE TYPE: Primary | ICD-10-CM

## 2025-07-22 LAB
ALBUMIN SERPL-MCNC: 3.7 G/DL (ref 3.5–5.2)
ALBUMIN/GLOB SERPL: 1.2 G/DL
ALP SERPL-CCNC: 63 U/L (ref 39–117)
ALT SERPL W P-5'-P-CCNC: 25 U/L (ref 1–33)
ANION GAP SERPL CALCULATED.3IONS-SCNC: 10.3 MMOL/L (ref 5–15)
AST SERPL-CCNC: 15 U/L (ref 1–32)
BASOPHILS # BLD AUTO: 0.07 10*3/MM3 (ref 0–0.2)
BASOPHILS NFR BLD AUTO: 0.9 % (ref 0–1.5)
BILIRUB SERPL-MCNC: 0.2 MG/DL (ref 0–1.2)
BUN SERPL-MCNC: 9.8 MG/DL (ref 6–20)
BUN/CREAT SERPL: 14.6 (ref 7–25)
CALCIUM SPEC-SCNC: 9.4 MG/DL (ref 8.6–10.5)
CHLORIDE SERPL-SCNC: 102 MMOL/L (ref 98–107)
CO2 SERPL-SCNC: 22.7 MMOL/L (ref 22–29)
CREAT SERPL-MCNC: 0.67 MG/DL (ref 0.57–1)
DEPRECATED RDW RBC AUTO: 51.9 FL (ref 37–54)
EGFRCR SERPLBLD CKD-EPI 2021: 110.7 ML/MIN/1.73
EOSINOPHIL # BLD AUTO: 0.36 10*3/MM3 (ref 0–0.4)
EOSINOPHIL NFR BLD AUTO: 4.6 % (ref 0.3–6.2)
ERYTHROCYTE [DISTWIDTH] IN BLOOD BY AUTOMATED COUNT: 14.8 % (ref 12.3–15.4)
GLOBULIN UR ELPH-MCNC: 3.1 GM/DL
GLUCOSE SERPL-MCNC: 89 MG/DL (ref 65–99)
HCT VFR BLD AUTO: 42.3 % (ref 34–46.6)
HGB BLD-MCNC: 13.8 G/DL (ref 12–15.9)
HOLD SPECIMEN: NORMAL
HOLD SPECIMEN: NORMAL
IMM GRANULOCYTES # BLD AUTO: 0.02 10*3/MM3 (ref 0–0.05)
IMM GRANULOCYTES NFR BLD AUTO: 0.3 % (ref 0–0.5)
LIPASE SERPL-CCNC: 32 U/L (ref 13–60)
LYMPHOCYTES # BLD AUTO: 1.59 10*3/MM3 (ref 0.7–3.1)
LYMPHOCYTES NFR BLD AUTO: 20.1 % (ref 19.6–45.3)
MAGNESIUM SERPL-MCNC: 1.9 MG/DL (ref 1.6–2.6)
MCH RBC QN AUTO: 31 PG (ref 26.6–33)
MCHC RBC AUTO-ENTMCNC: 32.6 G/DL (ref 31.5–35.7)
MCV RBC AUTO: 95.1 FL (ref 79–97)
MONOCYTES # BLD AUTO: 0.64 10*3/MM3 (ref 0.1–0.9)
MONOCYTES NFR BLD AUTO: 8.1 % (ref 5–12)
NEUTROPHILS NFR BLD AUTO: 5.22 10*3/MM3 (ref 1.7–7)
NEUTROPHILS NFR BLD AUTO: 66 % (ref 42.7–76)
NRBC BLD AUTO-RTO: 0 /100 WBC (ref 0–0.2)
NT-PROBNP SERPL-MCNC: <36 PG/ML (ref 0–450)
PLATELET # BLD AUTO: 306 10*3/MM3 (ref 140–450)
PMV BLD AUTO: 9.5 FL (ref 6–12)
POTASSIUM SERPL-SCNC: 4.1 MMOL/L (ref 3.5–5.2)
PROT SERPL-MCNC: 6.8 G/DL (ref 6–8.5)
QT INTERVAL: 375 MS
QTC INTERVAL: 395 MS
RBC # BLD AUTO: 4.45 10*6/MM3 (ref 3.77–5.28)
SODIUM SERPL-SCNC: 135 MMOL/L (ref 136–145)
TROPONIN T SERPL HS-MCNC: <6 NG/L
WBC NRBC COR # BLD AUTO: 7.9 10*3/MM3 (ref 3.4–10.8)
WHOLE BLOOD HOLD COAG: NORMAL
WHOLE BLOOD HOLD SPECIMEN: NORMAL

## 2025-07-22 PROCEDURE — 93005 ELECTROCARDIOGRAM TRACING: CPT | Performed by: EMERGENCY MEDICINE

## 2025-07-22 PROCEDURE — 83690 ASSAY OF LIPASE: CPT | Performed by: EMERGENCY MEDICINE

## 2025-07-22 PROCEDURE — 25810000003 SODIUM CHLORIDE 0.9 % SOLUTION: Performed by: EMERGENCY MEDICINE

## 2025-07-22 PROCEDURE — 93005 ELECTROCARDIOGRAM TRACING: CPT

## 2025-07-22 PROCEDURE — 36415 COLL VENOUS BLD VENIPUNCTURE: CPT

## 2025-07-22 PROCEDURE — 70450 CT HEAD/BRAIN W/O DYE: CPT

## 2025-07-22 PROCEDURE — 80053 COMPREHEN METABOLIC PANEL: CPT | Performed by: EMERGENCY MEDICINE

## 2025-07-22 PROCEDURE — 25010000002 KETOROLAC TROMETHAMINE PER 15 MG: Performed by: EMERGENCY MEDICINE

## 2025-07-22 PROCEDURE — 71045 X-RAY EXAM CHEST 1 VIEW: CPT

## 2025-07-22 PROCEDURE — 96361 HYDRATE IV INFUSION ADD-ON: CPT

## 2025-07-22 PROCEDURE — 96374 THER/PROPH/DIAG INJ IV PUSH: CPT

## 2025-07-22 PROCEDURE — 83880 ASSAY OF NATRIURETIC PEPTIDE: CPT | Performed by: EMERGENCY MEDICINE

## 2025-07-22 PROCEDURE — 84484 ASSAY OF TROPONIN QUANT: CPT | Performed by: EMERGENCY MEDICINE

## 2025-07-22 PROCEDURE — 99284 EMERGENCY DEPT VISIT MOD MDM: CPT

## 2025-07-22 PROCEDURE — 85025 COMPLETE CBC W/AUTO DIFF WBC: CPT | Performed by: EMERGENCY MEDICINE

## 2025-07-22 PROCEDURE — 83735 ASSAY OF MAGNESIUM: CPT | Performed by: EMERGENCY MEDICINE

## 2025-07-22 RX ORDER — ASPIRIN 81 MG/1
324 TABLET, CHEWABLE ORAL ONCE
Status: DISCONTINUED | OUTPATIENT
Start: 2025-07-22 | End: 2025-07-22

## 2025-07-22 RX ORDER — KETOROLAC TROMETHAMINE 15 MG/ML
15 INJECTION, SOLUTION INTRAMUSCULAR; INTRAVENOUS ONCE
Status: COMPLETED | OUTPATIENT
Start: 2025-07-22 | End: 2025-07-22

## 2025-07-22 RX ORDER — SODIUM CHLORIDE 0.9 % (FLUSH) 0.9 %
10 SYRINGE (ML) INJECTION AS NEEDED
Status: DISCONTINUED | OUTPATIENT
Start: 2025-07-22 | End: 2025-07-22 | Stop reason: HOSPADM

## 2025-07-22 RX ADMIN — SODIUM CHLORIDE 1000 ML: 9 INJECTION, SOLUTION INTRAVENOUS at 07:14

## 2025-07-22 RX ADMIN — KETOROLAC TROMETHAMINE 15 MG: 15 INJECTION, SOLUTION INTRAMUSCULAR; INTRAVENOUS at 07:11

## 2025-07-22 NOTE — Clinical Note
[facility] EMERGENCY ROOM  [address] [address]  Phone: [phone]  Fax: [phone]    Laxmi Jc was seen and treated in our emergency department on 7/22/2025.  She may return to work on 07/24/2025.         Thank you for choosing [facility].    Andres Spears MD

## 2025-07-22 NOTE — ED PROVIDER NOTES
Time: 6:11 AM EDT  Date of encounter:  7/22/2025  Independent Historian/Clinical History and Information was obtained by:   Patient    History is limited by: N/A    Chief Complaint: Headache, neck pain, does not feel well      History of Present Illness:  Patient is a 44 y.o. year old female who presents to the emergency department for evaluation of headache, neck pain, does not feel well.  Patient states this has not felt well for several days.  States that she was having some intermittent chest pain several days ago.  States that since that time she has just not felt well.  Does report that she has been having a headache.  Reports headache was worse on Friday and Saturday and was still there on Sunday and yesterday.  Woke up this morning her head and the back of her neck was hurting.  Denies fever.  States that she can move everything but this she just does not feel well.  No other complaints this time.  Does have a history of migraines      Patient Care Team  Primary Care Provider: Misa Chavez APRN    Past Medical History:     Allergies   Allergen Reactions    Floxin Otic [Ofloxacin] Other (See Comments)     Swelling of ear canal     Penicillins Rash and Other (See Comments)    Sulfa Antibiotics Rash and Other (See Comments)    Sulfamethoxazole-Trimethoprim Rash     Past Medical History:   Diagnosis Date    Adhesive capsulitis of left shoulder     Allergic     Anxiety     HX OF NO CURRENT ISSUES AND NOT ON ANY MEDICATIONS    Low back pain     HAS HAD 1 EPISODE OF LOWER BACK PAIN HAD SPRAINED IT. NO CURRENT ISSUES    Migraines     Palpitations     DENIES CP/SOA. REPORTS STARTED POST COVID.  FOLLOWED BY DR GUADARRAMA YEARLY. ACTIVE    Pituitary adenoma     FOLLOWED BY DR ESTEBAN [facility]UROLOG[facility]    Polymenorrhea     PT REPORTS HAS PERIODS ABOUT EVERY 2 WEEKS    Scleroderma     nonreactive    Tear of left rotator cuff      Past Surgical History:   Procedure Laterality Date    BREAST BIOPSY Left     COLONOSCOPY  Moustapha  2024    FAT GRAFTING Left     REPAIR OF EXCISIONAL BX LEFT    HYSTERECTOMY  07/23/2024    KIDNEY STONE SURGERY      OVARIAN CYST REMOVAL      SHOULDER ARTHROSCOPY W/ ROTATOR CUFF REPAIR Left 08/21/2023    Procedure: LEFT SHOULDER MANIPULATION,  ARTHROSCOPY ROTATOR CUFF DEBRIDEMENT AND SUBACROMIAL DECOMPRESSION , labral debridement, subpectoral biceps tenodesis;  Surgeon: Jv Norton MD;  [place] [facility] [place] [facility] [place]  Service: [place]opedics;  Laterality: Left;     Family History   Problem Relation Age of Onset    Cancer Mother         Colon & smoldering multiple myeloma    Kidney disease Mother     Hepatitis Mother     Autoimmune disease Mother     Bleeding Disorder Mother     Clotting disorder Mother     Rheumatologic disease Mother         Autoimmune Hepatitus    Collagen disease Mother         Autoimmune Hepatitus    Hyperlipidemia Father     Hypertension Father     Diabetes Father     Stroke Father     Kidney disease Father     Rheumatologic disease Father         Rheumatoid Arthritis    Clotting disorder Sister         Von willabronse disease    Clotting disorder Sister         Von willabronse    Stomach cancer Maternal Grandmother     Cancer Maternal Grandmother         Gall Bladder Cancer    Malig Hyperthermia Neg Hx        Home Medications:  Prior to Admission medications    Medication Sig Start Date End Date Taking? Authorizing Provider   azithromycin (Zithromax Z-Sonny) 250 MG tablet Take 2 tablets by mouth on day 1, then 1 tablet daily on days 2-5 7/8/25   Reina Tanner APRN   BD Pen Needle Jocelyn 2nd Gen 32G X 4 MM misc  10/27/24   Provider, MD Greta   brompheniramine-pseudoephedrine-DM 30-2-10 MG/5ML syrup Take 10 mL by mouth 4 (Four) Times a Day As Needed for Congestion or Cough. 1/27/25   Misa Chavez APRN   cetirizine (zyrTEC) 10 MG tablet Take 1 tablet by mouth Daily. 10/6/23   Yousuf Gomez MD   cyclobenzaprine (FLEXERIL) 10 MG tablet Take 1 tablet by mouth At Night As  "Needed for Muscle Spasms (TMJ). 25   Reina Tanner APRN   ibuprofen (ADVIL,MOTRIN) 800 MG tablet TAKE 1 TABLET EVERY 8 HOURS AS NEEDED FOR MILD PAIN 24   Jv Norton MD   methylPREDNISolone (MEDROL) 4 MG dose pack Take as directed on package instructions. 25   Reina Tanner APRN   metoprolol succinate XL (TOPROL-XL) 25 MG 24 hr tablet Take 1 tablet by mouth 2 (Two) Times a Day. Indications: Supraventricular Tachycardia, palpitations 25   Delphine Mejia APRN   Norditropin FlexPro 15 MG/1.5ML solution pen-injector  10/29/24   ProviderGreta MD   oxybutynin XL (DITROPAN-XL) 5 MG 24 hr tablet Take 1 tablet by mouth Daily. 25   Janay Us MD   Wegovy 0.25 MG/0.5ML solution auto-injector  24   Provider, MD Greta        Social History:   Social History     Tobacco Use    Smoking status: Former     Current packs/day: 0.00     Average packs/day: 0.5 packs/day for 10.0 years (5.0 ttl pk-yrs)     Types: Cigarettes     Start date:      Quit date: 3/1/2024     Years since quittin.3     Passive exposure: Current    Smokeless tobacco: Never    Tobacco comments:     Quit    Vaping Use    Vaping status: Never Used   Substance Use Topics    Alcohol use: Never    Drug use: Never         Review of Systems:  Review of Systems     Physical Exam:  /79   Pulse 78   Temp 98.1 °F (36.7 °C) (Oral)   Resp 21   Ht 154.9 cm (61\")   Wt 69.1 kg (152 lb 5.4 oz)   LMP 2024 (Approximate)   SpO2 100%   BMI 28.78 kg/m²     Physical Exam  Vitals and nursing note reviewed.   Constitutional:       Appearance: Normal appearance.   HENT:      Head: Normocephalic and atraumatic.   Eyes:      General: No scleral icterus.  Cardiovascular:      Rate and Rhythm: Normal rate and regular rhythm.      Heart sounds: Normal heart sounds.   Pulmonary:      Effort: Pulmonary effort is normal.      Breath sounds: Normal breath sounds.   Abdominal:      " Palpations: Abdomen is soft.      Tenderness: There is no abdominal tenderness.   Musculoskeletal:         General: Normal range of motion.      Cervical back: Normal range of motion.   Skin:     Findings: No rash.   Neurological:      General: No focal deficit present.      Mental Status: She is alert and oriented to person, place, and time.      Cranial Nerves: No cranial nerve deficit.      Motor: No weakness.                    Medical Decision Making:      Comorbidities that affect care:    Allergies, migraines, scleroderma    External Notes reviewed:    Reviewed note from 7/8/2025      The following orders were placed and all results were independently analyzed by me:  Orders Placed This Encounter   Procedures    XR Chest 1 View    CT Head Without Contrast    [place] Draw    High Sensitivity Troponin T    Comprehensive Metabolic Panel    Lipase    BNP    Magnesium    CBC Auto Differential    NPO Diet NPO Type: Strict NPO    Undress & Gown    Continuous Pulse Oximetry    Oxygen Therapy- Nasal Cannula; Titrate 1-6 LPM Per SpO2; 90 - 95%    ECG 12 Lead ED Triage Standing Order; Chest Pain    ECG 12 Lead ED Triage Standing Order; Chest Pain    Insert Peripheral IV    CBC & Differential    Green Top (Gel)    Lavender Top    Gold Top - SST    Light Blue Top       Medications Given in the Emergency Department:  Medications   sodium chloride 0.9 % flush 10 mL (has no administration in time range)   sodium chloride 0.9 % bolus 1,000 mL (1,000 mL Intravenous New Bag 7/22/25 0714)   ketorolac (TORADOL) injection 15 mg (15 mg Intravenous Given 7/22/25 0711)        ED Course:    ED Course as of 07/22/25 0840   Tue Jul 22, 2025   0609 EKG inter by me  Time: 0 527  Heart rate 67  Sinus, normal QRS, normal axis, no acute ischemia [MA]      ED Course User Index  [MA] Andres Spears MD       Labs:    Lab Results (last 24 hours)       Procedure Component Value Units Date/Time    High Sensitivity Troponin T [[id number]]   (Normal) Collected: 07/22/25 0606    Specimen: Blood Updated: 07/22/25 0644     HS Troponin T <6 ng/L     Narrative:      High Sensitive Troponin T Reference Range:  <14.0 ng/L- Negative Female for AMI  <22.0 ng/L- Negative Male for AMI  >=14 - Abnormal Female indicating possible myocardial injury.  >=22 - Abnormal Male indicating possible myocardial injury.   Clinicians would have to utilize clinical acumen, EKG, Troponin, and serial changes to determine if it is an Acute Myocardial Infarction or myocardial injury due to an underlying chronic condition.         CBC & Differential [[id number]]  (Normal) Collected: 07/22/25 0606    Specimen: Blood Updated: 07/22/25 0620    Narrative:      The following orders were created for panel order CBC & Differential.  Procedure                               Abnormality         Status                     ---------                               -----------         ------                     CBC Auto Differential[[id number]]        Normal              Final result                 Please view results for these tests on the individual orders.    Comprehensive Metabolic Panel [[id number]]  (Abnormal) Collected: 07/22/25 0606    Specimen: Blood Updated: 07/22/25 0644     Glucose 89 mg/dL      BUN 9.8 mg/dL      Creatinine 0.67 mg/dL      Sodium 135 mmol/L      Potassium 4.1 mmol/L      Comment: Slight hemolysis detected by analyzer. Result may be falsely elevated.        Chloride 102 mmol/L      CO2 22.7 mmol/L      Calcium 9.4 mg/dL      Total Protein 6.8 g/dL      Albumin 3.7 g/dL      ALT (SGPT) 25 U/L      AST (SGOT) 15 U/L      Alkaline Phosphatase 63 U/L      Total Bilirubin 0.2 mg/dL      Globulin 3.1 gm/dL      A/G Ratio 1.2 g/dL      BUN/Creatinine Ratio 14.6     Anion Gap 10.3 mmol/L      eGFR 110.7 mL/min/1.73     Narrative:      GFR Categories in Chronic Kidney Disease (CKD)              GFR Category          GFR (mL/min/1.73)    Interpretation  G1                    90 or  greater        Normal or high (1)  G2                    60-89                Mild decrease (1)  G3a                   45-59                Mild to moderate decrease  G3b                   30-44                Moderate to severe decrease  G4                    15-29                Severe decrease  G5                    14 or less           Kidney failure    (1)In the absence of evidence of kidney disease, neither GFR category G1 or G2 fulfill the criteria for CKD.    eGFR calculation 2021 CKD-EPI creatinine equation, which does not include race as a factor    Lipase [[id number]]  (Normal) Collected: 07/22/25 0606    Specimen: Blood Updated: 07/22/25 0644     Lipase 32 U/L     BNP [[id number]]  (Normal) Collected: 07/22/25 0606    Specimen: Blood Updated: 07/22/25 0636     proBNP <36.0 pg/mL     Narrative:      This assay is used as an aid in the diagnosis of individuals suspected of having heart failure. It can be used as an aid in the diagnosis of acute decompensated heart failure (ADHF) in patients presenting with signs and symptoms of ADHF to the emergency department (ED). In addition, NT-proBNP of <300 pg/mL indicates ADHF is not likely.    Age Range Result Interpretation  NT-proBNP Concentration (pg/mL:      <50             Positive            >450                   Gray                 300-450                    Negative             <300    50-75           Positive            >900                  Gray                300-900                  Negative            <300      >75             Positive            >1800                  Gray                300-1800                  Negative            <300    Magnesium [[id number]]  (Normal) Collected: 07/22/25 0606    Specimen: Blood Updated: 07/22/25 0644     Magnesium 1.9 mg/dL     CBC Auto Differential [[id number]]  (Normal) Collected: 07/22/25 0606    Specimen: Blood Updated: 07/22/25 0620     WBC 7.90 10*3/mm3      RBC 4.45 10*6/mm3      Hemoglobin 13.8 g/dL       Hematocrit 42.3 %      MCV 95.1 fL      MCH 31.0 pg      MCHC 32.6 g/dL      RDW 14.8 %      RDW-SD 51.9 fl      MPV 9.5 fL      Platelets 306 10*3/mm3      Neutrophil % 66.0 %      Lymphocyte % 20.1 %      Monocyte % 8.1 %      Eosinophil % 4.6 %      Basophil % 0.9 %      Immature Grans % 0.3 %      Neutrophils, Absolute 5.22 10*3/mm3      Lymphocytes, Absolute 1.59 10*3/mm3      Monocytes, Absolute 0.64 10*3/mm3      Eosinophils, Absolute 0.36 10*3/mm3      Basophils, Absolute 0.07 10*3/mm3      Immature Grans, Absolute 0.02 10*3/mm3      nRBC 0.0 /100 WBC              Imaging:    CT Head Without Contrast  Result Date: 7/22/2025  CT HEAD WO CONTRAST HISTORY: Headache. TECHNIQUE: Axial unenhanced head CT with multiplanar reformats. Radiation dose reduction techniques included automated exposure control or exposure modulation based on body size. COMPARISON: None. FINDINGS: Ventricular size and configuration are normal. No acute infarct or hemorrhage is identified. There are no masses. There is no skull fracture. Partially visible are mucous retention cysts in the maxillary sinuses.     1.No acute intracranial abnormality identified. 2.Partially visible are mucous retention cysts in the maxillary sinuses. Electronically Signed: Michael Ervin MD  7/22/2025 6:40 AM EDT  Workstation ID: [id number]    XR Chest 1 View  Result Date: 7/22/2025  XR CHEST 1 VW Date of Exam: 7/22/2025 5:37 AM EDT Indication: Chest Pain Triage Protocol Comparison: 8/3/2024 Findings: Cardiac and mediastinal contours are normal. Pulmonary vascularity is normal. The lungs are clear. No pneumothorax.     No active disease. Electronically Signed: Michael Ervin MD  7/22/2025 5:40 AM EDT  Workstation ID: [id number]        Differential Diagnosis and Discussion:    Chest Pain:  Based on the patient's signs and symptoms, I considered aortic dissection, myocardial infaction, pulmonary embolism, cardiac tamponade, pericarditis, pneumothorax,  musculoskeletal chest pain and other differential diagnosis as an etiology of the patient's chest pain.   Headache: Differential diagnosis includes but is not limited to migraine, cluster headache, hypertension, tumor, subarachnoid bleeding, pseudotumor cerebri, temporal arteritis, infections, tension headache, and TMJ syndrome.  Viral syndrome: Differential diagnosis includes but is not limited to influenza, common cold, COVID-19, RSV, adenovirus, enteroviruses, herpes virus, hepatitis virus, measles, mumps, rubella, dengue fever, and possible bacterial infection.    PROCEDURES:    Labs were collected in the emergency department and all labs were reviewed and interpreted by me.  X-ray were performed in the emergency department and all X-ray impressions were independently interpreted by me.  An EKG was performed and the EKG was interpreted by me.  CT scan was performed in the emergency department and the CT scan radiology impression was interpreted by me.    ECG 12 Lead ED Triage Standing Order; Chest Pain   Preliminary Result   HEART RATE=67  bpm   RR Djovahsr=944  ms   KS Bpzfsjvg=606  ms   P Horizontal Axis=-10  deg   P Front Axis=37  deg   QRSD Interval=77  ms   QT Ecccyitf=251  ms   JYbG=998  ms   QRS Axis=47  deg   T Wave Axis=30  deg   - NORMAL ECG -   Sinus rhythm   Date and Time of Study:2025-07-22 05:27:51          Procedures    MDM     Amount and/or Complexity of Data Reviewed  Clinical lab tests: reviewed  Tests in the radiology section of CPT®: reviewed  Tests in the medicine section of CPT®: reviewed       Patient 44-year-old who presents with complaints of headache, neck pain, does not feel well.  Could be viral in nature but labs and imaging here did not show any acute findings.  She also could have a migraine that has been lingering.  Was given Toradol here with improvement                Patient Care Considerations:          Consultants/Shared Management Plan:    None    Social Determinants of  Health:    Patient is independent, reliable, and has access to care.       Disposition and Care Coordination:    Discharged: The patient is suitable and stable for discharge with no need for consideration of admission.    I have explained the patient´s condition, diagnoses and treatment plan based on the information available to me at this time. I have answered questions and addressed any concerns. The patient has a good  understanding of the patient´s diagnosis, condition, and treatment plan as can be expected at this point. The vital signs have been stable. The patient´s condition is stable and appropriate for discharge from the emergency department.      The patient will pursue further outpatient evaluation with the primary care physician or other designated or consulting physician as outlined in the discharge instructions. They are agreeable to this plan of care and follow-up instructions have been explained in detail. The patient has received these instructions in written format and have expressed an understanding of the discharge instructions. The patient is aware that any significant change in condition or worsening of symptoms should prompt an immediate return to this or the closest emergency department or call to 911.      Final diagnoses:   Acute intractable headache, unspecified headache type        ED Disposition       ED Disposition   Discharge    Condition   Stable    Comment   --               This medical record created using voice recognition software.             Andres Spears MD  07/22/25 [id number]

## 2025-08-08 LAB
QT INTERVAL: 375 MS
QTC INTERVAL: 395 MS

## 2025-08-27 RX ORDER — METOPROLOL SUCCINATE 25 MG/1
TABLET, EXTENDED RELEASE ORAL
Qty: 180 TABLET | Refills: 3 | Status: SHIPPED | OUTPATIENT
Start: 2025-08-27

## (undated) DEVICE — SUT PROLN 0 CT1 30IN 8424H

## (undated) DEVICE — INTEGRATED CASSETTE TUBING, DO NOT USE IF PACKAGE IS DAMAGED: Brand: CROSSFLOW

## (undated) DEVICE — 90-S CRUISE, SUCTION PROBE, NON-BENDABLE, MAX CUT LEVEL 1: Brand: SERFAS ENERGY

## (undated) DEVICE — CANN TWST IN TRPL DAM 7CM 8.25MM

## (undated) DEVICE — MAT FLR ABS W/BLU/LINER 56X72IN WHT

## (undated) DEVICE — COLD THERAPY BLANKET: Brand: DEROYAL

## (undated) DEVICE — GLV SURG SENSICARE SLT PF LF 7.5 STRL

## (undated) DEVICE — COLD THERAPY WRAP: Brand: DEROYAL

## (undated) DEVICE — SOL IRR NACL 0.9PCT 3000ML

## (undated) DEVICE — STERILE POLYISOPRENE POWDER-FREE SURGICAL GLOVES WITH EMOLLIENT COATING: Brand: PROTEXIS

## (undated) DEVICE — INTENDED FOR TISSUE SEPARATION, AND OTHER PROCEDURES THAT REQUIRE A SHARP SURGICAL BLADE TO PUNCTURE OR CUT.: Brand: BARD-PARKER ® CARBON RIB-BACK BLADES

## (undated) DEVICE — COMFORT ARM SLING: Brand: DEROYAL

## (undated) DEVICE — SUT ETHLN 3-0 FS118IN 663H

## (undated) DEVICE — DRSNG GZ PETROLTM XEROFORM CURAD 1X8IN STRL

## (undated) DEVICE — SHOULDER ARTHROSCOPY-LF: Brand: MEDLINE INDUSTRIES, INC.

## (undated) DEVICE — PENCL E/S SMOKEEVAC W/TELESCP CANN

## (undated) DEVICE — SUT MONOCRYL PLS ANTIB UND 3/0  PS1 27IN

## (undated) DEVICE — SLV DISTRACTION STAR VELCRO XL DISP

## (undated) DEVICE — SLV SCD KN/LEN ADJ EXPRSS BLENDED MD 1P/U

## (undated) DEVICE — PAD GRND REM POLYHESIVE A/ DISP

## (undated) DEVICE — BLD CUT FORMLA AGGR PLS 5.0MM

## (undated) DEVICE — 450 ML BOTTLE OF 0.05% CHLORHEXIDINE GLUCONATE IN 99.95% STERILE WATER FOR IRRIGATION, USP AND APPLICATOR.: Brand: IRRISEPT ANTIMICROBIAL WOUND LAVAGE

## (undated) DEVICE — GLV SURG SENSICARE PI ORTHO SZ8 LF STRL

## (undated) DEVICE — BUR BRL FORMLA 12FLUT 4MM